# Patient Record
Sex: MALE | Race: WHITE | NOT HISPANIC OR LATINO | Employment: UNEMPLOYED | URBAN - METROPOLITAN AREA
[De-identification: names, ages, dates, MRNs, and addresses within clinical notes are randomized per-mention and may not be internally consistent; named-entity substitution may affect disease eponyms.]

---

## 2017-01-15 ENCOUNTER — TRANSFERRED RECORDS (OUTPATIENT)
Dept: HEALTH INFORMATION MANAGEMENT | Facility: CLINIC | Age: 37
End: 2017-01-15

## 2017-01-20 ENCOUNTER — RADIANT APPOINTMENT (OUTPATIENT)
Dept: GENERAL RADIOLOGY | Facility: CLINIC | Age: 37
End: 2017-01-20
Attending: FAMILY MEDICINE
Payer: COMMERCIAL

## 2017-01-20 ENCOUNTER — OFFICE VISIT (OUTPATIENT)
Dept: FAMILY MEDICINE | Facility: CLINIC | Age: 37
End: 2017-01-20
Payer: COMMERCIAL

## 2017-01-20 VITALS
DIASTOLIC BLOOD PRESSURE: 87 MMHG | HEART RATE: 59 BPM | SYSTOLIC BLOOD PRESSURE: 128 MMHG | TEMPERATURE: 97.1 F | HEIGHT: 69 IN | BODY MASS INDEX: 27.11 KG/M2 | WEIGHT: 183 LBS | OXYGEN SATURATION: 98 %

## 2017-01-20 DIAGNOSIS — S69.91XD RIGHT WRIST INJURY, SUBSEQUENT ENCOUNTER: Primary | ICD-10-CM

## 2017-01-20 DIAGNOSIS — S69.91XD RIGHT WRIST INJURY, SUBSEQUENT ENCOUNTER: ICD-10-CM

## 2017-01-20 DIAGNOSIS — S63.501D WRIST SPRAIN, RIGHT, SUBSEQUENT ENCOUNTER: ICD-10-CM

## 2017-01-20 PROCEDURE — 73110 X-RAY EXAM OF WRIST: CPT | Mod: RT

## 2017-01-20 PROCEDURE — 73130 X-RAY EXAM OF HAND: CPT | Mod: RT

## 2017-01-20 PROCEDURE — 99214 OFFICE O/P EST MOD 30 MIN: CPT | Performed by: FAMILY MEDICINE

## 2017-01-20 NOTE — NURSING NOTE
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    Weight as of this encounter: 183 lb (83.008 kg).  BP completed using cuff size: regular       Health Maintenance due pending provider review:  NONE    n/a      Oziel Boo CMA  "

## 2017-01-20 NOTE — PROGRESS NOTES
SUBJECTIVE:                                                    Dalton Tillman is a 36 year old male who presents to clinic today for the following health issues: right wrist pain.     On 1/13/17 patient slipped on ice and injured his right wrist. On 1/14/17 his wrist became swollen. On 1/15/17 he went to urgent care due to the pain and swelling in his wrist. His X-ray at urgent care showed no fracture. He was given a splint and told to follow up in 5 days.   Today he has decreased swelling and bruising. Still has some pain on the lateral dorsum of right hand. Has decreased ROM of right wrist.  Does not have numbness. He has been out of work since injuring his hand as he needs to lift heavy objects at work. Today he says he is barely able to lift a gallon of milk.       ED/UC Followup:    Facility:  [facility]  Date of visit: 1/15/2017  Reason for visit: right wrist injury,  Fell on ice 1/13/2017  Current Status: fine, needs FMLA papers filled out for work             Problem list and histories reviewed & adjusted, as indicated.  Additional history: as documented    Patient Active Problem List   Diagnosis     CARDIOVASCULAR SCREENING; LDL GOAL LESS THAN 160     No past surgical history on file.    Social History   Substance Use Topics     Smoking status: Former Smoker     Quit date: 10/15/2016     Smokeless tobacco: Never Used     Alcohol Use: 0.0 oz/week     0 Standard drinks or equivalent per week      Comment: Socailly     Family History   Problem Relation Age of Onset     Prostate Cancer Maternal Grandfather      MIKEY. Father      MI, 50         Current Outpatient Prescriptions   Medication Sig Dispense Refill     NO ACTIVE MEDICATIONS        imiquimod (ALDARA) 5 % cream Apply a small sized amount to warts or molluscum three times weekly at bedtime.   Wash off after 8 hours.   May use for up to 16 weeks. 12 packet 3     No Known Allergies  Labs reviewed in EPIC      ROS:  No fevers or chills. No other joint or muscle  "pain besides right wrist and hand pain.     OBJECTIVE:                                                    /87 mmHg  Pulse 59  Temp(Src) 97.1  F (36.2  C) (Oral)  Ht 5' 9\" (1.753 m)  Wt 183 lb (83.008 kg)  BMI 27.01 kg/m2  SpO2 98%  Body mass index is 27.01 kg/(m^2).  GENERAL: healthy, alert and no distress  RESP: lungs clear to auscultation - no rales, rhonchi or wheezes  CV: regular rate and rhythm, normal S1 S2, no S3 or S4, no murmur, click or rub, no peripheral edema and peripheral pulses strong  ABDOMEN: soft, nontender, no hepatosplenomegaly, no masses and bowel sounds normal  MS: right wrist- no edema and full ROM, no bruising , there is some pain in the ulnar part of the hand and also with movement in the wrist , no erythema . Decreased wrist ROM on right compared to left. Right  strength 4/5, left  strength 5/5. Hand is warm and well profused.     Diagnostic Test Results:  Xray - on the right wrist , no fractures      ASSESSMENT/PLAN:                                                    (S69.91XD) Right wrist injury, subsequent encounter  (primary encounter diagnosis)    Plan: XR Wrist Right G/E 3 Views, XR Hand Right G/E 3        Views  Ibuprofen for pain, ice a few x a day for 15 min each time, rest , avoid lifting and carrying heavy stuff and i have filled out his FMLA forms until Feb 7th , when he will come for a f/u OV. Pt is aware  and comfortable with the current plan.          - wear splint on wrist only a couple hours a day.        - can return to work 2/7/17  The medical student Ludwin  has acted as my scribe.  I have completed all components of the history, physical exam and assessment and plan and agree with the note as documented.    RTC if no improving or worsening.  Pt is aware  and comfortable with the current plan.      Kendy Gonzalez MD  [facility]    "

## 2017-01-20 NOTE — Clinical Note
[facility]  [address] [place]  [facility] [address] [phone]      January 20, 2017      Dalton Tillman  [address]  [facility] [address]        To Whom It May Concern :  This is a letter , indicating that the above pt was seen today at our clinic and he cannot work until February 4th, 2017.  Let me know if you have any questions.          Sincerely,      Kendy Gonzalez M.D.

## 2017-01-20 NOTE — NURSING NOTE
FMLA  Form(s) have been faxed.  Faxed or mailed to: number listed on form.  Was a copy sent to scanning?   yes sent to [employer] scanning on [place]  Oziel Boo CMA

## 2017-01-24 ENCOUNTER — TELEPHONE (OUTPATIENT)
Dept: FAMILY MEDICINE | Facility: CLINIC | Age: 37
End: 2017-01-24

## 2017-01-24 NOTE — TELEPHONE ENCOUNTER
Reason for Call:  Other call back    Detailed comments: pt calling to check on forms    Phone Number Patient can be reached at: Home number on file [phone] (home)    Best Time: anytime    Can we leave a detailed message on this number? YES    Call taken on 1/24/2017 at 2:48 PM by Angelica Perez

## 2017-01-24 NOTE — TELEPHONE ENCOUNTER
Spoke with patient, some questions on form were missed  Put form  back on LS desk to fill out and re fax   Oziel Boo CMA

## 2017-01-24 NOTE — TELEPHONE ENCOUNTER
Received form(s) from [employer] for FMLA.  Placed form(s) in/on LS's box.  Forms need to be filled out and signed and faxed to number listed on form..    Call pt to verify form was sent: No  Copy needs to be sent for scanning after completion: Yes    Previous visit printed and needs to be sent with forms    Jena Avendaño CMA

## 2017-01-25 NOTE — TELEPHONE ENCOUNTER
Form(s) have been faxed.  Faxed or mailed to: number listed on form.  Was a copy sent to scanning?   yes sent to [employer] scanning on [place]    Oziel Boo CMA

## 2017-02-04 ENCOUNTER — HOSPITAL ENCOUNTER (EMERGENCY)
Facility: CLINIC | Age: 37
Discharge: HOME | End: 2017-02-04
Attending: EMERGENCY MEDICINE | Admitting: EMERGENCY MEDICINE
Payer: COMMERCIAL

## 2017-02-04 VITALS
OXYGEN SATURATION: 97 % | TEMPERATURE: 98.6 F | SYSTOLIC BLOOD PRESSURE: 144 MMHG | BODY MASS INDEX: 25.76 KG/M2 | DIASTOLIC BLOOD PRESSURE: 103 MMHG | RESPIRATION RATE: 18 BRPM | WEIGHT: 170 LBS | HEART RATE: 69 BPM | HEIGHT: 68 IN

## 2017-02-04 DIAGNOSIS — S52.502A CLOSED FRACTURE DISTAL RADIUS AND ULNA, LEFT, INITIAL ENCOUNTER: ICD-10-CM

## 2017-02-04 DIAGNOSIS — S52.602A CLOSED FRACTURE DISTAL RADIUS AND ULNA, LEFT, INITIAL ENCOUNTER: ICD-10-CM

## 2017-02-04 PROCEDURE — 99283 EMERGENCY DEPT VISIT LOW MDM: CPT

## 2017-02-04 PROCEDURE — 25000132 ZZH RX MED GY IP 250 OP 250 PS 637: Performed by: EMERGENCY MEDICINE

## 2017-02-04 RX ORDER — OXYCODONE AND ACETAMINOPHEN 5; 325 MG/1; MG/1
1-2 TABLET ORAL EVERY 4 HOURS PRN
Qty: 15 TABLET | Refills: 0 | Status: SHIPPED | OUTPATIENT
Start: 2017-02-04 | End: 2018-03-16

## 2017-02-04 RX ORDER — ASPIRIN 81 MG
100 TABLET, DELAYED RELEASE (ENTERIC COATED) ORAL DAILY
Qty: 15 TABLET | Refills: 1 | Status: SHIPPED | OUTPATIENT
Start: 2017-02-04 | End: 2017-02-07

## 2017-02-04 RX ORDER — OXYCODONE AND ACETAMINOPHEN 5; 325 MG/1; MG/1
2 TABLET ORAL ONCE
Status: COMPLETED | OUTPATIENT
Start: 2017-02-04 | End: 2017-02-04

## 2017-02-04 RX ADMIN — OXYCODONE HYDROCHLORIDE AND ACETAMINOPHEN 2 TABLET: 5; 325 TABLET ORAL at 20:25

## 2017-02-04 ASSESSMENT — ENCOUNTER SYMPTOMS: ARTHRALGIAS: 1

## 2017-02-04 NOTE — ED AVS SNAPSHOT
Emergency Department    [address] [place] [address]    Phone:  [phone]    Fax:  [phone]                                       Dalton Tillman   MRN: [id number]    Department:   Emergency Department   Date of Visit:  2/4/2017           After Visit Summary Signature Page     I have received my discharge instructions, and my questions have been answered. I have discussed any challenges I see with this plan with the nurse or doctor.    ..........................................................................................................................................  Patient/Patient Representative Signature      ..........................................................................................................................................  Patient Representative Print Name and Relationship to Patient    ..................................................               ................................................  Date                                            Time    ..........................................................................................................................................  Reviewed by Signature/Title    ...................................................              ..............................................  Date                                                            Time

## 2017-02-04 NOTE — ED AVS SNAPSHOT
Emergency Department    [address] [facility] [address]    Phone:  [phone]    Fax:  [phone]                                       Dalton Tillman   MRN: [id number]    Department:   Emergency Department   Date of Visit:  2/4/2017           Patient Information     Date Of Birth          1980        Your diagnoses for this visit were:     Closed fracture distal radius and ulna, left, initial encounter        You were seen by Simona Guerra MD.      Follow-up Information     Follow up with Fransisca Garcia MD.    Specialty:  Orthopedics    Why:  Monday    Contact information:    [facility]  [address] [address]  [phone]        Discharge References/Attachments     FRACTURE, WRIST, GENERAL (ENGLISH)      Future Appointments        Provider Department Dept Phone Center    2/7/2017 9:30 AM Kendy Gonzalez MD [facility] [phone] UP      24 Hour Appointment Hotline       To make an appointment at any [facility] call [phone] ([phone]). If you don't have a family doctor or clinic, we will help you find one. [facility] are conveniently located to serve the needs of you and your family.             Review of your medicines      START taking        Dose / Directions Last dose taken    docusate sodium 100 MG tablet   Commonly known as:  COLACE   Dose:  100 mg   Quantity:  15 tablet        Take 100 mg by mouth daily   Refills:  1        oxyCODONE-acetaminophen 5-325 MG per tablet   Commonly known as:  PERCOCET   Dose:  1-2 tablet   Quantity:  15 tablet        Take 1-2 tablets by mouth every 4 hours as needed for pain   Refills:  0          Our records show that you are taking the medicines listed below. If these are incorrect, please call your family doctor or clinic.        Dose / Directions Last dose taken    imiquimod 5 % cream   Commonly known as:  ALDARA   Quantity:  12 packet        Apply a small sized  amount to warts or molluscum three times weekly at bedtime.   Wash off after 8 hours.   May use for up to 16 weeks.   Refills:  3        NO ACTIVE MEDICATIONS        Refills:  0                Prescriptions were sent or printed at these locations (2 Prescriptions)                   Other Prescriptions                Printed at Department/Unit printer (2 of 2)         oxyCODONE-acetaminophen (PERCOCET) 5-325 MG per tablet               docusate sodium (COLACE) 100 MG tablet                Orders Needing Specimen Collection     None      Pending Results     No orders found from 2/3/2017 to 2/5/2017.            Pending Culture Results     No orders found from 2/3/2017 to 2/5/2017.             Test Results from your hospital stay            Clinical Quality Measure: Blood Pressure Screening     Your blood pressure was checked while you were in the emergency department today. The last reading we obtained was  BP: (!) 179/104 mmHg . Please read the guidelines below about what these numbers mean and what you should do about them.  If your systolic blood pressure (the top number) is less than 120 and your diastolic blood pressure (the bottom number) is less than 80, then your blood pressure is normal. There is nothing more that you need to do about it.  If your systolic blood pressure (the top number) is 120-139 or your diastolic blood pressure (the bottom number) is 80-89, your blood pressure may be higher than it should be. You should have your blood pressure rechecked within a year by a primary care provider.  If your systolic blood pressure (the top number) is 140 or greater or your diastolic blood pressure (the bottom number) is 90 or greater, you may have high blood pressure. High blood pressure is treatable, but if left untreated over time it can put you at risk for heart attack, stroke, or kidney failure. You should have your blood pressure rechecked by a primary care provider within the next 4 weeks.  If your  "provider in the emergency department today gave you specific instructions to follow-up with your doctor or provider even sooner than that, you should follow that instruction and not wait for up to 4 weeks for your follow-up visit.        Thank you for choosing [facility]       Thank you for choosing [facility] for your care. Our goal is always to provide you with excellent care. Hearing back from our patients is one way we can continue to improve our services. Please take a few minutes to complete the written survey that you may receive in the mail after you visit with us. Thank you!        Nearpod Information     Nearpod lets you send messages to your doctor, view your test results, renew your prescriptions, schedule appointments and more. To sign up, go to [email or web address] . Click on \"Log in\" on the left side of the screen, which will take you to the Welcome page. Then click on \"Sign up Now\" on the right side of the page.     You will be asked to enter the access code listed below, as well as some personal information. Please follow the directions to create your username and password.     Your access code is: [email or web address]  Expires: 2017  9:03 PM     Your access code will  in 90 days. If you need help or a new code, please call your [facility] clinic or [phone].        Care EveryWhere ID     This is your Care EveryWhere ID. This could be used by other organizations to access your [facility] medical records  [id number]        After Visit Summary       This is your record. Keep this with you and show to your community pharmacist(s) and doctor(s) at your next visit.                  "

## 2017-02-05 NOTE — ED PROVIDER NOTES
"  History     Chief Complaint:  Wrist Pain      HPI   Dalton Tillman is a right hand dominant 36 year old male who presents for evaluation of wrist pain. Yesterday, the patient was snowboarding in [facility] when he fell on his outstretched left wrist sustaining an injury. He did not strike his head or sustain any other injury. He was seen in an emergency room there where he had X-rays indicating closed distal radius and ulna fractures that were reduced there and a plaster cast was applied with cuts at 180 degrees. He was provided a short course Percocet and Ibuprofen and discharged with a splint with instructions to follow up in the ED in the [facility] for potential surgery. He got home this evening so presents to the ED due to these instructions. Currently in the ED, the patient rates his pain at a severity of 7/10. He last took Percocet at 1400 today and last ate at 1430. He denies any numbness of the fingers.    Allergies:  NKDA     Medications:    Aldara cream  Ibuprofen  Percocet     Past Medical History:    History reviewed. No pertinent past medical history.     Past Surgical History:    History reviewed. No pertinent past surgical history.     Family History:    CAD - Father     Social History:  Tobacco use:    Former smoker, quit 10/15/2016  Alcohol use:    Positive, socially  Marital status:    Single   Accompanied to ED by:  Female       Review of Systems   Musculoskeletal: Positive for arthralgias (left wrist).   All other systems reviewed and are negative.    Physical Exam   First Vitals:  BP: (!) 179/104 mmHg  Heart Rate: 88  Temp: 98.6  F (37  C)  Resp: 18  Height: 172.7 cm (5' 8\")  Weight: 77.111 kg (170 lb)  SpO2: 98 %      Physical Exam    Physical Exam   Constitutional:  Patient is oriented to person, place, and time. They appear well-developed and well-nourished. Mild distress secondary to wrist pain.    HENT:   Mouth/Throat:   Oropharynx is clear and moist.   Eyes:    Conjunctivae " normal and EOM are normal. Pupils are equal, round, and reactive to light.   Neck:    Normal range of motion.   Cardiovascular: Normal rate, regular rhythm and normal heart sounds.  Exam reveals no gallop and no friction rub.  No murmur heard.  Pulmonary/Chest:  Effort normal and breath sounds normal. Patient has no wheezes. Patient has no rales.   Abdominal:   Soft. Bowel sounds are normal. Patient exhibits no mass. There is no tenderness. There is no rebound and no guarding.   Musculoskeletal:  The left wrist has a plaster cast on which does have slits on it at 180 degrees. The fingers are mildly edematous. He has normal sensation all fingers. He has pain radiating to the wrist with movement of the fingers.   Neurological:   Patient is alert and oriented to person, place, and time. Patient has normal strength. No cranial nerve deficit or sensory deficit. GCS 15  Skin:   Skin is warm and dry. No rash noted. No erythema.   Psychiatric:   Patient has a normal mood and affect. Patient's behavior is normal. Judgment and thought content normal.     Emergency Department Course     Interventions:  2025 Percocet 2 tablets PO    Emergency Department Course:  Nursing notes and vitals reviewed.  2005: I performed an exam of the patient as documented above.     X-ray tech loaded his X-rays onto the PACS system from the DVD he provided.     2038: I updated and reassessed the patient.     Findings and plan explained to the Patient. Patient discharged home with instructions regarding supportive care, medications, and reasons to return. The importance of close follow-up was reviewed. The patient was prescribed Percocet and Colace.      Impression & Plan      Medical Decision Making:  Dalton Tillman is a 36 year old male presenting to the emergency room at the recommendation of the emergency room in [place] where he was seen for a closed distal radius and ulna fracture. He understood that he was supposed to come to the  emergency room for surgery, however this is a closed fracture. He appears to be neurologically intact, although range of motion is unable to be assessed because he is in a cast. I was able to view his pre and post reduction X-rays and there appears to be fairly good anatomical alignment however this is intraarticular so will require surgery for stabilization and healing. At this point, I did explain to him that I will refer him to hand surgery, no emergent need for ortho consult. I will give him pain medication as they did not prescribe this and he will remain in his cast as this is sufficiently stabilizing his injury. At this point, there is no sign of compartment syndrome that I can see and he had good range of motion of his fingers. The patient feels comfortable with this plan. He was rewrapped with an ace wrap and his sling was reapplied.     Diagnosis:    ICD-10-CM   1. Closed fracture distal radius and ulna, left, initial encounter S52.502A    S52.602A       Disposition:  Discharged to home with Colace and Percocet.     Discharge Medications:  New Prescriptions    DOCUSATE SODIUM (COLACE) 100 MG TABLET    Take 100 mg by mouth daily    OXYCODONE-ACETAMINOPHEN (PERCOCET) 5-325 MG PER TABLET    Take 1-2 tablets by mouth every 4 hours as needed for pain       I, Lambert Cat, am serving as a scribe at 8:05 PM on 2/4/2017 to document services personally performed by Dr. Guerra, based on my observations and the provider's statements to me.      EMERGENCY DEPARTMENT        Simona Guerra MD  02/04/17 [id number]

## 2017-02-07 ENCOUNTER — OFFICE VISIT (OUTPATIENT)
Dept: FAMILY MEDICINE | Facility: CLINIC | Age: 37
End: 2017-02-07
Payer: COMMERCIAL

## 2017-02-07 VITALS
BODY MASS INDEX: 26.83 KG/M2 | OXYGEN SATURATION: 99 % | WEIGHT: 177 LBS | HEART RATE: 92 BPM | SYSTOLIC BLOOD PRESSURE: 140 MMHG | RESPIRATION RATE: 18 BRPM | TEMPERATURE: 99.5 F | DIASTOLIC BLOOD PRESSURE: 90 MMHG | HEIGHT: 68 IN

## 2017-02-07 DIAGNOSIS — S69.91XD RIGHT WRIST INJURY, SUBSEQUENT ENCOUNTER: Primary | ICD-10-CM

## 2017-02-07 PROCEDURE — 99213 OFFICE O/P EST LOW 20 MIN: CPT | Performed by: FAMILY MEDICINE

## 2017-02-07 NOTE — Clinical Note
[facility]  [address] [place]  [facility] [address] [phone]      February 7, 2017      Dalton Tillman  [address]  [facility] [address]        To Whom It May Concern     This is a letter indicating that the above pt is under my professional care and right wrist is no longer preventing him from returning to work . He can use his right wrist without restrictions.          Sincerely,      Kendy Gonzalez M.D.    [facility]

## 2017-02-07 NOTE — PROGRESS NOTES
SUBJECTIVE:                                                    Dalton Tillman is a 36 year old male who presents to clinic today for the following health issues:      Patient states that he needs clearance letter from provider to be able to return to work for right wrist. 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  Meanwhile he was snowboarding in [place] last week and broke his left arm several spots , seen at [facility] for that         Problem list and histories reviewed & adjusted, as indicated.  Additional history: as documented    Patient Active Problem List   Diagnosis     CARDIOVASCULAR SCREENING; LDL GOAL LESS THAN 160     History reviewed. No pertinent past surgical history.    Social History   Substance Use Topics     Smoking status: Former Smoker     Quit date: 10/15/2016     Smokeless tobacco: Never Used     Alcohol Use: 0.0 oz/week     0 Standard drinks or equivalent per week      Comment: Socailly     Family History   Problem Relation Age of Onset     Prostate Cancer Maternal Grandfather      NAOMIA.D. Father      MI, 50         Current Outpatient Prescriptions   Medication Sig Dispense Refill     HydrOXYzine Pamoate (VISTARIL PO)        oxyCODONE-acetaminophen (PERCOCET) 5-325 MG per tablet Take 1-2 tablets by mouth every 4 hours as needed for pain 15 tablet 0     imiquimod (ALDARA) 5 % cream Apply a small sized amount to warts or molluscum three times weekly at bedtime.   Wash off after 8 hours.   May use for up to 16 weeks. 12 packet 3     No Known Allergies  Recent Labs   Lab Test  09/13/12   0750   LDL  81   HDL  61   TRIG  66      BP Readings from Last 3 Encounters:   02/07/17 140/90   02/04/17 144/103   01/20/17 128/87    Wt Readings from Last 3 Encounters:   02/07/17 177 lb (80.287 kg)   02/04/17 170 lb (77.111 kg)   01/20/17 183 lb (83.008 kg)                  Labs reviewed in EPIC  Problem list,  "Medication list, Allergies, and Medical/Social/Surgical histories reviewed in EPIC and updated as appropriate.    ROS:  Constitutional, HEENT, cardiovascular, pulmonary, GI, , musculoskeletal, neuro, skin, endocrine and psych systems are negative, except as otherwise noted.    OBJECTIVE:                                                    /90 mmHg  Pulse 92  Temp(Src) 99.5  F (37.5  C) (Tympanic)  Resp 18  Ht 5' 8\" (1.727 m)  Wt 177 lb (80.287 kg)  BMI 26.92 kg/m2  SpO2 99%  Body mass index is 26.92 kg/(m^2).  GENERAL: healthy, alert and no distress  MS: no gross musculoskeletal defects noted, no edema    Diagnostic Test Results:  none      ASSESSMENT/PLAN:                                                        (S69.91XD) Right wrist injury, subsequent encounter  (primary encounter diagnosis)  Comment: seems that his right wrist is fine and now he has a good ROM there, no pain , no edema no erythema, note written for work that he is Ok to return without restrictions as far as the rigth wrist is concerned   Plan: Pt is aware  and comfortable with the current plan.  RTC if no improving or worsening.    Kendy Gonzalez MD  [facility]      HPI      ROS      Physical Exam      "

## 2017-02-07 NOTE — NURSING NOTE
"Chief Complaint   Patient presents with     Musculoskeletal Problem     Right wrist      /100 mmHg  Pulse 92  Temp(Src) 99.5  F (37.5  C) (Tympanic)  Resp 18  Ht 5' 8\" (1.727 m)  Wt 177 lb (80.287 kg)  BMI 26.92 kg/m2  SpO2 99% Estimated body mass index is 26.92 kg/(m^2) as calculated from the following:    Height as of this encounter: 5' 8\" (1.727 m).    Weight as of this encounter: 177 lb (80.287 kg).  bp completed using cuff size: regular       Health Maintenance addressed:  BP was high, used pink card, recheck manually    Possibly completing today per provider review.    Stacy Felix MA       "

## 2018-03-16 ENCOUNTER — NURSE TRIAGE (OUTPATIENT)
Dept: NURSING | Facility: CLINIC | Age: 38
End: 2018-03-16

## 2018-03-16 ENCOUNTER — RADIANT APPOINTMENT (OUTPATIENT)
Dept: GENERAL RADIOLOGY | Facility: CLINIC | Age: 38
End: 2018-03-16
Attending: FAMILY MEDICINE
Payer: COMMERCIAL

## 2018-03-16 ENCOUNTER — OFFICE VISIT (OUTPATIENT)
Dept: FAMILY MEDICINE | Facility: CLINIC | Age: 38
End: 2018-03-16
Payer: COMMERCIAL

## 2018-03-16 VITALS
HEART RATE: 85 BPM | WEIGHT: 175.7 LBS | SYSTOLIC BLOOD PRESSURE: 118 MMHG | TEMPERATURE: 98.1 F | BODY MASS INDEX: 26.63 KG/M2 | RESPIRATION RATE: 16 BRPM | OXYGEN SATURATION: 98 % | DIASTOLIC BLOOD PRESSURE: 76 MMHG | HEIGHT: 68 IN

## 2018-03-16 DIAGNOSIS — S00.03XA HEMATOMA OF SCALP, INITIAL ENCOUNTER: Primary | ICD-10-CM

## 2018-03-16 DIAGNOSIS — R55 VASOVAGAL SYNCOPE: ICD-10-CM

## 2018-03-16 DIAGNOSIS — S00.03XA HEMATOMA OF SCALP, INITIAL ENCOUNTER: ICD-10-CM

## 2018-03-16 PROCEDURE — 70260 X-RAY EXAM OF SKULL: CPT | Mod: FY

## 2018-03-16 PROCEDURE — 99213 OFFICE O/P EST LOW 20 MIN: CPT | Performed by: FAMILY MEDICINE

## 2018-03-16 NOTE — MR AVS SNAPSHOT
After Visit Summary   3/16/2018    Dalton Tillman    MRN: [id number]           Patient Information     Date Of Birth          1980        Visit Information        Provider Department      3/16/2018 1:00 PM Corinna Knapp MD [facility]        Care Instructions      Hematoma  A hematoma is a collection of blood trapped outside of a blood vessel. It is what we think of as a bruise or a contusion. It is usually seen under the skin as a black and blue spot on your arm or leg, or a bump on your head after an injury. It can be almost anywhere on or in your body. It can also occur in an internal organ where it can be more serious.  A hematoma is caused by an injury with damage to small blood vessels. This causes blood to leak into the tissues. Blood forms a pocket under the skin that swells and looks like a purplish patch.  Gradually the blood in the hematoma is absorbed back into the body. The swelling and pain of the hematoma will go away. This takes from 1 to 4 weeks, depending on the size of the hematoma. The skin over the hematoma may turn bluish then brown and yellow as the blood is dissolved and absorbed. Usually, this only takes a couple of weeks but can last months.  Home care    Limit motion of the joints near the hematoma. If the hematoma is large and painful, avoid sports and other vigorous physical activity until the swelling and pain goes away.    Apply an ice pack (ice cubes in a plastic bag, wrapped in a thin towel or a frozen bag of peas) over the injured area for 20 minutes every 1 to 2 hours the first day. Continue with ice packs 3 to 4 times a day for the next 2 days. Continue the use of ice packs for relief of pain and swelling as needed.    If you need anything for pain, you can take acetaminophen, unless you were given a different pain medicine to use. Talk with your doctor before using this medicine if you have chronic liver or kidney disease. Also talk with your  doctor if you have had a stomach ulcer or digestive tract bleeding, or are taking blood-thinner medicines.  Follow-up care  Follow up with your healthcare provider, or as advised. If X-rays or a CT scan were done, you will be notified if there is a change in the reading, especially if it affects treatment.  When to seek medical advice  Call your healthcare provider right away f any of the following occur:    Redness around the hematoma    Increase in pain or warmth in the hematoma    Increase in size of the hematoma    Fever of 100.4 F (38 C) or higher, or as directed by your healthcare provider    If the hematoma is on the arm or leg, watch for:    Increased swelling or pain in the extremity    Numbness or tingling or blue color of the hand or foot  Date Last Reviewed: 11/5/2015 2000-2017 [employer]. [address] [place] [address]. All rights reserved. This information is not intended as a substitute for professional medical care. Always follow your healthcare professional's instructions.        Uncertain Causes of Fall  You have had a fall today. But the cause of your fall is not certain. Falls can occur due to slipping, tripping or losing your balance. A fall can also occur from a fainting spell or seizure.  While a fall can happen for a simple reason (tripping over something), falls in elderly people are often caused by a combination of things:    Age-related decline in function with worsening balance, stability, vision, and muscle strength    Chronic illness such as heart arrhythmias, heart valve disease, vascular disease, COPD, diabetes, strokes, arthritis    Effects or side effects of medicines    Dehydration.    Environmental hazards such as uneven or slippery ground, unfamiliar place, obstacles, uneven surfaces, or slippery ground    Situational factors (related to the activity being done, e.g., rushing to the bathroom)  Because the cause of your fall today is not certain, it is  possible that a fainting spell or seizure was the cause. This means that it could happen again, without warning. If you fall again, without a cause, then you should return to this facility promptly to have further tests. Otherwise, follow up with your doctor as explained below.  It is normal to feel sore and tight in your muscles and back the next day, and not just the muscles you initially injured. Remember, all the parts of your body are connected, so while initially one area hurts, the next day another may hurt. Also, when you injure yourself, it causes inflammation, which then causes the muscles to tighten up and hurt more. After the initial worsening, it should gradually improve over the next few days. However, more severe pain should be reported.  Even without a definite head injury, you can still get a concussion. Concussions and even bleeding can still occur, especially if you have had a recent injury or take blood thinner medicine. It is not unusual to have a mild headache and feel tired and even nauseous or dizzy.  Home care    Rest today and resume your normal activities as soon as you are feeling back to normal. It is best to remain with someone who can check on you for the next 24 hours to watch for another episode of falling.    If you were injured during the fall, follow the advice from your doctor regarding care of your injury.     If you become light-headed or dizzy, lie down immediately or sit and lean forward with your head down.    As a precaution, do not drive a car or operate dangerous equipment, do not take a bath alone (use a shower instead) and do not swim alone until you see your doctor. A condition causing fainting or seizures must be ruled out before resuming these activities.    You may use acetaminophen or ibuprofen to control pain, unless another pain medicine was prescribed. If you have chronic liver or kidney disease or ever had a stomach ulcer or gastrointestinal bleeding, talk with  your doctor before using these medicines.    Keep your appointments for any further testing that may have been scheduled for you.  Follow-up care  Follow up with your healthcare provider, or as advised.  If X-rays or CT scan were done, you will be notified if there is a change in the reading, especially if it affects treatment.  Call 911  Call 911 if any of these occur:    Trouble breathing    Confused or difficulty arousing    Fainting or loss of consciousness    Rapid or very slow heart rate    Seizure    Difficulty with speech or vision, weakness of an arm or leg    Difficulty walking or talking, loss of balance, numbness or weakness in one side of your body, facial droop  When to seek medical advice  Call your healthcare provider right away if any of these occur:    Another unexplained fall    Dizziness    Severe headache    Nausea and vomiting    Blood in vomit, stools (black or red color)  Date Last Reviewed: 11/5/2015 2000-2017 [employer]. [address] [place] [address]. All rights reserved. This information is not intended as a substitute for professional medical care. Always follow your healthcare professional's instructions.          Follow-ups after your visit        Follow-up notes from your care team     Return if symptoms worsen or fail to improve.      Who to contact     If you have questions or need follow up information about today's clinic visit or your schedule please contact [facility] directly at [phone].  Normal or non-critical lab and imaging results will be communicated to you by MyChart, letter or phone within 4 business days after the clinic has received the results. If you do not hear from us within 7 days, please contact the clinic through MyChart or phone. If you have a critical or abnormal lab result, we will notify you by phone as soon as possible.  Submit refill requests through [employer] or call your pharmacy and they will forward the  "refill request to us. Please allow 3 business days for your refill to be completed.          Additional Information About Your Visit        MyChart Information     [employer] lets you send messages to your doctor, view your test results, renew your prescriptions, schedule appointments and more. To sign up, go to [email or web address] . Click on \"Log in\" on the left side of the screen, which will take you to the Welcome page. Then click on \"Sign up Now\" on the right side of the page.     You will be asked to enter the access code listed below, as well as some personal information. Please follow the directions to create your username and password.     Your access code is: [email or web address]  Expires: 2018  1:31 PM     Your access code will  in 90 days. If you need help or a new code, please call your [facility] clinic or [phone].        Care EveryWhere ID     This is your Care EveryWhere ID. This could be used by other organizations to access your [facility] medical records  [id number]        Your Vitals Were     Pulse Temperature Respirations Height Pulse Oximetry BMI (Body Mass Index)    85 98.1  F (36.7  C) (Oral) 16 5' 8\" (1.727 m) 98% 26.72 kg/m2       Blood Pressure from Last 3 Encounters:   18 118/76   17 140/90   17 (!) 144/103    Weight from Last 3 Encounters:   18 175 lb 11.2 oz (79.7 kg)   17 177 lb (80.3 kg)   17 170 lb (77.1 kg)              Today, you had the following     No orders found for display       Primary Care Provider Office Phone # Fax #    Manjinder Filipe Rodriguez [phone] [address] [facility] [address]        Equal Access to Services     ILIANA PATEL : Charles Gates, federico sanders, jael mcneil. [facility] [phone].    ATENCIÓN: Si habla español, tiene a juarez disposición servicios gratuitos de asistencia lingüística. Llame al " [phone].    We comply with applicable federal civil rights laws and [place] laws. We do not discriminate on the basis of race, color, national origin, age, disability, sex, sexual orientation, or gender identity.            Thank you!     Thank you for choosing [facility]  for your care. Our goal is always to provide you with excellent care. Hearing back from our patients is one way we can continue to improve our services. Please take a few minutes to complete the written survey that you may receive in the mail after your visit with us. Thank you!             Your Updated Medication List - Protect others around you: Learn how to safely use, store and throw away your medicines at www.disposemymeds.org.          This list is accurate as of 3/16/18  1:31 PM.  Always use your most recent med list.                   Brand Name Dispense Instructions for use Diagnosis    imiquimod 5 % cream    ALDARA    12 packet    Apply a small sized amount to warts or molluscum three times weekly at bedtime.   Wash off after 8 hours.   May use for up to 16 weeks.    Genital warts       VISTARIL PO

## 2018-03-16 NOTE — PROGRESS NOTES
SUBJECTIVE:   Dalton Tillman is a 37 year old male who presents to clinic today for the following health issues:      Chief Complaint   Patient presents with     Syncope     Please see TE about recent episode     Head Injury     Patient injured the back of his head when he fainted last night.     Last night the patient was sitting on the sofa for a few hours working.  Around 7 PM, he decided to go to the restroom and was planning to go out afterward for a class.  He was standing on on the toilet voiding and surrounding felt dizzy and fell unconscious.  He woke up looking at the bathroom ceiling.  Later he noticed pain in the posterior scalp.  Denies noticing any bleeding.  Decided to stay home last night.  He denies any numbness tingling changes in his vision or his hearing weakness.  Felt throbbing sensation today in his head today but denies noticing any headaches.  Denies feeling confused or disoriented except for one episode when he was taking the elevator to the clinic he stepped out of the elevator on the wrong floor. Pain at the site of impact. 2-3 out of ten. No severe enough to take pain medication for.   Denies having any similar symptoms in the past.  Denies smoking alcohol or drug use within the last few days.  He denies doing anything unusual.  Patient does not have any chronic medical conditions.  And denies feeling chest pain or palpitations.    Problem list and histories reviewed & adjusted, as indicated.  Additional history: as documented    Patient Active Problem List   Diagnosis     CARDIOVASCULAR SCREENING; LDL GOAL LESS THAN 160     History reviewed. No pertinent surgical history.    Social History   Substance Use Topics     Smoking status: Former Smoker     Quit date: 10/15/2016     Smokeless tobacco: Never Used     Alcohol use 0.0 oz/week     0 Standard drinks or equivalent per week      Comment: Socailly     Family History   Problem Relation Age of Onset     Prostate Cancer Maternal  "Grandfather      MIKEY. Father      MI, 50           Reviewed and updated as needed this visit by clinical staff  Tobacco  Allergies  Meds  Problems  Med Hx  Surg Hx  Fam Hx  Soc Hx        Reviewed and updated as needed this visit by Provider         ROS:  Constitutional, HEENT, cardiovascular, pulmonary, gi and gu systems are negative, except as otherwise noted.    OBJECTIVE:     /76  Pulse 85  Temp 98.1  F (36.7  C) (Oral)  Resp 16  Ht 5' 8\" (1.727 m)  Wt 175 lb 11.2 oz (79.7 kg)  SpO2 98%  BMI 26.72 kg/m2  Body mass index is 26.72 kg/(m^2).  GENERAL: healthy, alert and no distress  RESP: lungs clear to auscultation - no rales, rhonchi or wheezes  CV: regular rate and rhythm, normal S1 S2, no S3 or S4, no murmur, click or rub, no peripheral edema and peripheral pulses strong  ABDOMEN: soft, nontender, no hepatosplenomegaly, no masses and bowel sounds normal  SKIN: Area of erythema and tenderness to palpation over the sagittal suture. In the middle of parietal bone.  2.5 cm x 2.5 cm.   NEURO: Normal strength and tone, mentation intact and speech normal    Diagnostic Test Results:  none     ASSESSMENT/PLAN:   1. Vasovagal syncope  Assessment: We discussed all the possible differential diagnosis for syncopal episode which includes cardiac abnormality, hypotension, intracranial abnormality.  During today's exam, the patient denies any neurological symptoms or cardiac symptoms.  Exam was unremarkable.  He was advised to increase his oral hydration over the next few days.  Monitor symptoms if he has nausea vomiting headache or any neurological symptoms or recurrence of the syncopal episodes, he should present to the emergency room on an urgent care for immediate evaluation.  He was also informed that if symptoms developed, he should get a CT scan we ordered for him. The patient denies any concerns at this time. Reports feeling well without confusion or disorientation.   - CT Head w/o Contrast; " Future  - XR Skull G/E 4 Views; Future    2. Hematoma of scalp, initial encounter  Localized superficial hematoma. Patient was advised to do cold compress to alleviate the swelling.   Plan:  - XR Skull G/E 4 Views; Future    Corinna Knapp MD  [facility]  PAGER: [phone] or (W): [phone]

## 2018-03-16 NOTE — TELEPHONE ENCOUNTER
Dalton fainted last night while urinating and felt dizzy and woke up on the floor.  Dalton did hit his head but denies vomiting, double vision and loss of coordination.  Dalton feels fine currently.

## 2018-03-16 NOTE — PATIENT INSTRUCTIONS
Hematoma  A hematoma is a collection of blood trapped outside of a blood vessel. It is what we think of as a bruise or a contusion. It is usually seen under the skin as a black and blue spot on your arm or leg, or a bump on your head after an injury. It can be almost anywhere on or in your body. It can also occur in an internal organ where it can be more serious.  A hematoma is caused by an injury with damage to small blood vessels. This causes blood to leak into the tissues. Blood forms a pocket under the skin that swells and looks like a purplish patch.  Gradually the blood in the hematoma is absorbed back into the body. The swelling and pain of the hematoma will go away. This takes from 1 to 4 weeks, depending on the size of the hematoma. The skin over the hematoma may turn bluish then brown and yellow as the blood is dissolved and absorbed. Usually, this only takes a couple of weeks but can last months.  Home care    Limit motion of the joints near the hematoma. If the hematoma is large and painful, avoid sports and other vigorous physical activity until the swelling and pain goes away.    Apply an ice pack (ice cubes in a plastic bag, wrapped in a thin towel or a frozen bag of peas) over the injured area for 20 minutes every 1 to 2 hours the first day. Continue with ice packs 3 to 4 times a day for the next 2 days. Continue the use of ice packs for relief of pain and swelling as needed.    If you need anything for pain, you can take acetaminophen, unless you were given a different pain medicine to use. Talk with your doctor before using this medicine if you have chronic liver or kidney disease. Also talk with your doctor if you have had a stomach ulcer or digestive tract bleeding, or are taking blood-thinner medicines.  Follow-up care  Follow up with your healthcare provider, or as advised. If X-rays or a CT scan were done, you will be notified if there is a change in the reading, especially if it affects  treatment.  When to seek medical advice  Call your healthcare provider right away f any of the following occur:    Redness around the hematoma    Increase in pain or warmth in the hematoma    Increase in size of the hematoma    Fever of 100.4 F (38 C) or higher, or as directed by your healthcare provider    If the hematoma is on the arm or leg, watch for:    Increased swelling or pain in the extremity    Numbness or tingling or blue color of the hand or foot  Date Last Reviewed: 11/5/2015 2000-2017 [employer]. [address] [place] [address]. All rights reserved. This information is not intended as a substitute for professional medical care. Always follow your healthcare professional's instructions.        Uncertain Causes of Fall  You have had a fall today. But the cause of your fall is not certain. Falls can occur due to slipping, tripping or losing your balance. A fall can also occur from a fainting spell or seizure.  While a fall can happen for a simple reason (tripping over something), falls in elderly people are often caused by a combination of things:    Age-related decline in function with worsening balance, stability, vision, and muscle strength    Chronic illness such as heart arrhythmias, heart valve disease, vascular disease, COPD, diabetes, strokes, arthritis    Effects or side effects of medicines    Dehydration.    Environmental hazards such as uneven or slippery ground, unfamiliar place, obstacles, uneven surfaces, or slippery ground    Situational factors (related to the activity being done, e.g., rushing to the bathroom)  Because the cause of your fall today is not certain, it is possible that a fainting spell or seizure was the cause. This means that it could happen again, without warning. If you fall again, without a cause, then you should return to this facility promptly to have further tests. Otherwise, follow up with your doctor as explained below.  It is normal to  feel sore and tight in your muscles and back the next day, and not just the muscles you initially injured. Remember, all the parts of your body are connected, so while initially one area hurts, the next day another may hurt. Also, when you injure yourself, it causes inflammation, which then causes the muscles to tighten up and hurt more. After the initial worsening, it should gradually improve over the next few days. However, more severe pain should be reported.  Even without a definite head injury, you can still get a concussion. Concussions and even bleeding can still occur, especially if you have had a recent injury or take blood thinner medicine. It is not unusual to have a mild headache and feel tired and even nauseous or dizzy.  Home care    Rest today and resume your normal activities as soon as you are feeling back to normal. It is best to remain with someone who can check on you for the next 24 hours to watch for another episode of falling.    If you were injured during the fall, follow the advice from your doctor regarding care of your injury.     If you become light-headed or dizzy, lie down immediately or sit and lean forward with your head down.    As a precaution, do not drive a car or operate dangerous equipment, do not take a bath alone (use a shower instead) and do not swim alone until you see your doctor. A condition causing fainting or seizures must be ruled out before resuming these activities.    You may use acetaminophen or ibuprofen to control pain, unless another pain medicine was prescribed. If you have chronic liver or kidney disease or ever had a stomach ulcer or gastrointestinal bleeding, talk with your doctor before using these medicines.    Keep your appointments for any further testing that may have been scheduled for you.  Follow-up care  Follow up with your healthcare provider, or as advised.  If X-rays or CT scan were done, you will be notified if there is a change in the reading,  especially if it affects treatment.  Call 911  Call 911 if any of these occur:    Trouble breathing    Confused or difficulty arousing    Fainting or loss of consciousness    Rapid or very slow heart rate    Seizure    Difficulty with speech or vision, weakness of an arm or leg    Difficulty walking or talking, loss of balance, numbness or weakness in one side of your body, facial droop  When to seek medical advice  Call your healthcare provider right away if any of these occur:    Another unexplained fall    Dizziness    Severe headache    Nausea and vomiting    Blood in vomit, stools (black or red color)  Date Last Reviewed: 11/5/2015 2000-2017 [facility]. [address] [place] [address], [place] [address]. All rights reserved. This information is not intended as a substitute for professional medical care. Always follow your healthcare professional's instructions.

## 2018-03-16 NOTE — LETTER
March 16, 2018      Dalton Tillman  [address] [facility] [address]        To Whom It May Concern:    Dalton Tillman was seen in our clinic. He may return to work on (03/20/2018) without restrictions.           Sincerely,          Corinna Knapp MD

## 2018-09-10 ENCOUNTER — OFFICE VISIT (OUTPATIENT)
Dept: FAMILY MEDICINE | Facility: CLINIC | Age: 38
End: 2018-09-10
Payer: COMMERCIAL

## 2018-09-10 VITALS
WEIGHT: 179.5 LBS | HEIGHT: 68 IN | DIASTOLIC BLOOD PRESSURE: 76 MMHG | BODY MASS INDEX: 27.2 KG/M2 | TEMPERATURE: 98.1 F | OXYGEN SATURATION: 100 % | SYSTOLIC BLOOD PRESSURE: 130 MMHG | HEART RATE: 65 BPM

## 2018-09-10 DIAGNOSIS — J06.9 VIRAL UPPER RESPIRATORY TRACT INFECTION: Primary | ICD-10-CM

## 2018-09-10 LAB
DEPRECATED S PYO AG THROAT QL EIA: NORMAL
SPECIMEN SOURCE: NORMAL

## 2018-09-10 PROCEDURE — 87081 CULTURE SCREEN ONLY: CPT | Performed by: FAMILY MEDICINE

## 2018-09-10 PROCEDURE — 99213 OFFICE O/P EST LOW 20 MIN: CPT | Performed by: FAMILY MEDICINE

## 2018-09-10 PROCEDURE — 87880 STREP A ASSAY W/OPTIC: CPT | Performed by: FAMILY MEDICINE

## 2018-09-10 NOTE — PATIENT INSTRUCTIONS
Self-Care for Sore Throats    Sore throats happen for many reasons, such as colds, allergies, and infections caused by viruses or bacteria. In any case, your throat becomes red and sore. Your goal for self-care is to reduce your discomfort while giving your throat a chance to heal.  Moisten and soothe your throat  Tips include the following:    Try a sip of water first thing after waking up.    Keep your throat moist by drinking 6 or more glasses of clear liquids every day.    Run a cool-air humidifier in your room overnight.    Avoid cigarette smoke.     Suck on throat lozenges, cough drops, hard candy, ice chips, or frozen fruit-juice bars. Use the sugar-free versions if your diet or medical condition requires them.  Gargle to ease irritation  Gargling every hour or 2 can ease irritation. Try gargling with 1 of these solutions:    1/4 teaspoon of salt in 1/2 cup of warm water    An over-the-counter anesthetic gargle  Use medicine for more relief  Over-the-counter medicine can reduce sore throat symptoms. Ask your pharmacist if you have questions about which medicine to use:    Ease pain with anesthetic sprays. Aspirin or an aspirin substitute also helps. Remember, never give aspirin to anyone 18 or younger, or if you are already taking blood thinners.     For sore throats caused by allergies, try antihistamines to block the allergic reaction.    Remember: unless a sore throat is caused by a bacterial infection, antibiotics won t help you.  Prevent future sore throats  Prevention tips include the following:    Stop smoking or reduce contact with secondhand smoke. Smoke irritates the tender throat lining.    Limit contact with pets and with allergy-causing substances, such as pollen and mold.    When you re around someone with a sore throat or cold, wash your hands often to keep viruses or bacteria from spreading.    Don t strain your vocal cords.  Call your healthcare provider  Contact your healthcare provider if  you have:    A temperature over 101 F (38.3 C)    White spots on the throat    Great difficulty swallowing    Trouble breathing    A skin rash    Recent exposure to someone else with strep bacteria    Severe hoarseness and swollen glands in the neck or jaw   Date Last Reviewed: 8/1/2016 2000-2017 [employer]. [address] [place], [place] [address]. All rights reserved. This information is not intended as a substitute for professional medical care. Always follow your healthcare professional's instructions.

## 2018-09-10 NOTE — PROGRESS NOTES
SUBJECTIVE:   Dalton Tillman is a 37 year old male who presents to clinic today for the following health issues:    RESPIRATORY SYMPTOMS      Duration: Saturday night     Description  sore throat and cough    Severity: moderate    Accompanying signs and symptoms: stuffiness and lightheadedness     History (predisposing factors):  none    Precipitating or alleviating factors: None    Therapies tried and outcome:  None , coat tea,water with lemon    Patient reports a 2 day history of sore throat with associated non-productive cough, rhinorrhea, nasal congestion, post-nasal drip, and bilateral maxillary sinus pressure. The sore throat pain is exacerbated with swallowing and coughing. The patient did report one episode of emesis during the 1st day which he believes was secondary to a coughing fit. He denies any further episodes of emesis or nausea. He also denies fevers, chills, SOB, chest pain, abdominal pain, changes in bowel movements, and headaches. Of note, the patient is visiting a relative's  baby this weekend and is wondering whether he should be taking any specific medications or precautions prior to seeing the baby.    Problem list and histories reviewed & adjusted, as indicated.  Additional history: none    Patient Active Problem List   Diagnosis     CARDIOVASCULAR SCREENING; LDL GOAL LESS THAN 160     No past surgical history on file.    Social History   Substance Use Topics     Smoking status: Former Smoker     Quit date: 10/15/2016     Smokeless tobacco: Never Used     Alcohol use 0.0 oz/week     0 Standard drinks or equivalent per week      Comment: Socailly     Family History   Problem Relation Age of Onset     Prostate Cancer Maternal Grandfather      C.A.D. Father      MI, 50         Current Outpatient Prescriptions   Medication Sig Dispense Refill     HydrOXYzine Pamoate (VISTARIL PO)        imiquimod (ALDARA) 5 % cream Apply a small sized amount to warts or molluscum three times weekly at  "bedtime.   Wash off after 8 hours.   May use for up to 16 weeks. (Patient not taking: Reported on 3/16/2018) 12 packet 3     No Known Allergies  Labs reviewed in EPIC    Reviewed and updated as needed this visit by clinical staff  Allergies  Meds       Reviewed and updated as needed this visit by Provider         ROS:  Constitutional, HEENT, cardiovascular, pulmonary, gi and gu systems are negative, except as otherwise noted.    OBJECTIVE:     /76  Pulse 65  Temp 98.1  F (36.7  C) (Oral)  Ht 5' 8\" (1.727 m)  Wt 179 lb 8 oz (81.4 kg)  SpO2 100%  BMI 27.29 kg/m2  Body mass index is 27.29 kg/(m^2).  GENERAL: healthy, alert and no distress  HENT: ear canals and TM's normal. Posterior oropharynx shows erythema, but no edema or exudate. Nose and mouth without ulcers or lesions  NECK: no adenopathy, no asymmetry, masses, or scars and thyroid normal to palpation  RESP: lungs clear to auscultation - no rales, rhonchi or wheezes  CV: regular rate and rhythm, normal S1 S2, no S3 or S4, no murmur, click or rub  NEURO: A&Ox3, mentation intact and speech normal  PSYCH: mentation appears normal, affect normal/bright    Diagnostic Test Results:  Results for orders placed or performed in visit on 09/10/18 (from the past 24 hour(s))   Rapid strep screen   Result Value Ref Range    Specimen Description Throat     Rapid Strep A Screen       NEGATIVE: No Group A streptococcal antigen detected by immunoassay, await culture report.       ASSESSMENT/PLAN:   1. Viral upper respiratory tract infection   Assessment: We discussed the possible differentials for throat pain which includes strep throat, post-nasal drip, and viral URI. During the exam today the patient denies any purulent rhinorrhea, purulent cough, HA, and significant sinus pressure. Exam was significant for posterior oropharynx erythema which made the differential of a viral URI or irritation due to post-nasal drip more likely, especially with a negative rapid " strep. He was advised to try OTC medication for symptom relief and instructed to watch for signs of sinusitis which include worsening sinus pressure, HA, fever, or any other new symptoms. He was informed that if symptoms developed, he should call into the clinic or send a message over [employer] to receive a prescription for antibiotics. The patient denies any other concerns at this time.  - Rapid strep screen  - Beta strep group A culture    Gregorio Nolasco MS3    Attending Addendum- I obtained history, seen and evaluated the patient independently.   Results were discussed with the medical student directly. Agree with note above.     Corinna Knapp MD  [facility]

## 2018-09-10 NOTE — MR AVS SNAPSHOT
After Visit Summary   9/10/2018    Dalton Tillman    MRN: [id number]           Patient Information     Date Of Birth          1980        Visit Information        Provider Department      9/10/2018 8:00 AM Corinna Knapp MD [facility]        Today's Diagnoses     Throat pain    -  1      Care Instructions      Self-Care for Sore Throats    Sore throats happen for many reasons, such as colds, allergies, and infections caused by viruses or bacteria. In any case, your throat becomes red and sore. Your goal for self-care is to reduce your discomfort while giving your throat a chance to heal.  Moisten and soothe your throat  Tips include the following:    Try a sip of water first thing after waking up.    Keep your throat moist by drinking 6 or more glasses of clear liquids every day.    Run a cool-air humidifier in your room overnight.    Avoid cigarette smoke.     Suck on throat lozenges, cough drops, hard candy, ice chips, or frozen fruit-juice bars. Use the sugar-free versions if your diet or medical condition requires them.  Gargle to ease irritation  Gargling every hour or 2 can ease irritation. Try gargling with 1 of these solutions:    1/4 teaspoon of salt in 1/2 cup of warm water    An over-the-counter anesthetic gargle  Use medicine for more relief  Over-the-counter medicine can reduce sore throat symptoms. Ask your pharmacist if you have questions about which medicine to use:    Ease pain with anesthetic sprays. Aspirin or an aspirin substitute also helps. Remember, never give aspirin to anyone 18 or younger, or if you are already taking blood thinners.     For sore throats caused by allergies, try antihistamines to block the allergic reaction.    Remember: unless a sore throat is caused by a bacterial infection, antibiotics won t help you.  Prevent future sore throats  Prevention tips include the following:    Stop smoking or reduce contact with secondhand smoke. Smoke  "irritates the tender throat lining.    Limit contact with pets and with allergy-causing substances, such as pollen and mold.    When you re around someone with a sore throat or cold, wash your hands often to keep viruses or bacteria from spreading.    Don t strain your vocal cords.  Call your healthcare provider  Contact your healthcare provider if you have:    A temperature over 101 F (38.3 C)    White spots on the throat    Great difficulty swallowing    Trouble breathing    A skin rash    Recent exposure to someone else with strep bacteria    Severe hoarseness and swollen glands in the neck or jaw   Date Last Reviewed: 8/1/2016 2000-2017 [employer]. [address] [place], [place] [address]. All rights reserved. This information is not intended as a substitute for professional medical care. Always follow your healthcare professional's instructions.                Follow-ups after your visit        Who to contact     If you have questions or need follow up information about today's clinic visit or your schedule please contact [facility] directly at [phone].  Normal or non-critical lab and imaging results will be communicated to you by Rundownhart, letter or phone within 4 business days after the clinic has received the results. If you do not hear from us within 7 days, please contact the clinic through Rundownhart or phone. If you have a critical or abnormal lab result, we will notify you by phone as soon as possible.  Submit refill requests through GiveLoop or call your pharmacy and they will forward the refill request to us. Please allow 3 business days for your refill to be completed.          Additional Information About Your Visit        RundownharGame Blisters Information     GiveLoop lets you send messages to your doctor, view your test results, renew your prescriptions, schedule appointments and more. To sign up, go to [email or web address] . Click on \"Log in\" on the left side of the screen, " "which will take you to the Welcome page. Then click on \"Sign up Now\" on the right side of the page.     You will be asked to enter the access code listed below, as well as some personal information. Please follow the directions to create your username and password.     Your access code is: [email or web address]  Expires: 2018  8:45 AM     Your access code will  in 90 days. If you need help or a new code, please call your [facility] clinic or [phone].        Care EveryWhere ID     This is your Care EveryWhere ID. This could be used by other organizations to access your [facility] medical records  [id number]        Your Vitals Were     Pulse Temperature Height Pulse Oximetry BMI (Body Mass Index)       65 98.1  F (36.7  C) (Oral) 5' 8\" (1.727 m) 100% 27.29 kg/m2        Blood Pressure from Last 3 Encounters:   09/10/18 130/76   18 118/76   17 140/90    Weight from Last 3 Encounters:   09/10/18 179 lb 8 oz (81.4 kg)   18 175 lb 11.2 oz (79.7 kg)   17 177 lb (80.3 kg)              We Performed the Following     Beta strep group A culture     Rapid strep screen        Primary Care Provider Office Phone # Fax #    Manjinder Filipe Rodriguez [phone] [address] [facility] [address]        Equal Access to Services     [facility] Center[facility]ANK : Hadii aad ku hadasho Soomaali, waaxda luqadaha, qaybta kaalmada adeegyada, jael benson . So [facility] [phone].    ATENCIÓN: Si habla español, tiene a juarez disposición servicios gratuitos de asistencia lingüística. Llame al [phone].    We comply with applicable federal civil rights laws and [place] laws. We do not discriminate on the basis of race, color, national origin, age, disability, sex, sexual orientation, or gender identity.            Thank you!     Thank you for choosing [facility]  for your care. Our goal is always to provide you with excellent care. Hearing back from our patients " is one way we can continue to improve our services. Please take a few minutes to complete the written survey that you may receive in the mail after your visit with us. Thank you!             Your Updated Medication List - Protect others around you: Learn how to safely use, store and throw away your medicines at www.disposemymeds.org.          This list is accurate as of 9/10/18  8:45 AM.  Always use your most recent med list.                   Brand Name Dispense Instructions for use Diagnosis    imiquimod 5 % cream    ALDARA    12 packet    Apply a small sized amount to warts or molluscum three times weekly at bedtime.   Wash off after 8 hours.   May use for up to 16 weeks.    Genital warts       VISTARIL PO

## 2018-09-11 LAB
BACTERIA SPEC CULT: NORMAL
SPECIMEN SOURCE: NORMAL

## 2019-11-08 ENCOUNTER — OFFICE VISIT (OUTPATIENT)
Dept: FAMILY MEDICINE | Facility: CLINIC | Age: 39
End: 2019-11-08
Payer: COMMERCIAL

## 2019-11-08 VITALS
WEIGHT: 175.5 LBS | TEMPERATURE: 98.2 F | RESPIRATION RATE: 14 BRPM | DIASTOLIC BLOOD PRESSURE: 92 MMHG | BODY MASS INDEX: 26.6 KG/M2 | HEIGHT: 68 IN | SYSTOLIC BLOOD PRESSURE: 134 MMHG | HEART RATE: 63 BPM | OXYGEN SATURATION: 98 %

## 2019-11-08 DIAGNOSIS — A63.0 GENITAL WARTS: ICD-10-CM

## 2019-11-08 DIAGNOSIS — R03.0 PREHYPERTENSION: Primary | ICD-10-CM

## 2019-11-08 DIAGNOSIS — L98.9 SKIN LESION: ICD-10-CM

## 2019-11-08 PROCEDURE — 99214 OFFICE O/P EST MOD 30 MIN: CPT | Performed by: FAMILY MEDICINE

## 2019-11-08 RX ORDER — IMIQUIMOD 12.5 MG/.25G
CREAM TOPICAL
Qty: 12 PACKET | Refills: 3 | Status: SHIPPED | OUTPATIENT
Start: 2019-11-08 | End: 2021-01-27

## 2019-11-08 ASSESSMENT — MIFFLIN-ST. JEOR: SCORE: 1695.56

## 2019-11-08 ASSESSMENT — PAIN SCALES - GENERAL: PAINLEVEL: NO PAIN (0)

## 2019-11-08 NOTE — PROGRESS NOTES
Subjective     Dalton Tillman is a 38 year old male who presents to clinic today for the following health issues:    HPI   Hypertension Follow-up      Do you check your blood pressure regularly outside of the clinic? No     Are you following a low salt diet? No    Are your blood pressures ever more than 140 on the top number (systolic) OR more   than 90 on the bottom number (diastolic), for example 140/90? No      How many servings of fruits and vegetables do you eat daily?  2-3    On average, how many sweetened beverages do you drink each day (soda, juice, sweet tea, etc)?   2 cups of pop  How many days per week do you miss taking your medication?  Patient is not on bp medi's     What makes it hard for you to take your medications?  N/A    Also has a Hx of genital warts, we discussed aldara rx and efficacy    Also a skin lesion on nose,     ROS: As per HPI.    CV: no palpitations, no chest pain, no lower extremity swelling.  Resp: no shortness of breath, wheezing, or cough.    I have reviewed and updated the patient's past medical history in the EMR. Current problems are:    Patient Active Problem List   Diagnosis     CARDIOVASCULAR SCREENING; LDL GOAL LESS THAN 160     No past surgical history on file.    Social History     Tobacco Use     Smoking status: Former Smoker     Last attempt to quit: 10/15/2016     Years since quitting: 3.0     Smokeless tobacco: Never Used   Substance Use Topics     Alcohol use: Yes     Alcohol/week: 0.0 standard drinks     Comment: Socailly     Family History   Problem Relation Age of Onset     Prostate Cancer Maternal Grandfather      C.A.D. Father         MI, 50         Allergies, reviewed:   No Known Allergies    No current outpatient medications on file prior to visit.  No current facility-administered medications on file prior to visit.         Objective:  BP (!) 134/92 (BP Location: Right arm, Patient Position: Sitting, Cuff Size: Adult Regular)   Pulse 63   Temp 98.2  F (36.8  " C) (Oral)   Resp 14   Ht 1.727 m (5' 8\")   Wt 79.6 kg (175 lb 8 oz)   SpO2 98%   BMI 26.68 kg/m    General Appearance: Pleasant, alert, WN/WD in no acute respiratory distress.   Skin: nose lesion seb hyperplasia vs BCC, rec follow up with dermatology.  Neurologic Exam: Nonfocal, no tremor. Normal gait.  Psychiatric Exam: Alert - appropriate, normal affect      BP Readings from Last 3 Encounters:   11/08/19 (!) 134/92   09/10/18 130/76   03/16/18 118/76    Wt Readings from Last 3 Encounters:   11/08/19 79.6 kg (175 lb 8 oz)   09/10/18 81.4 kg (179 lb 8 oz)   03/16/18 79.7 kg (175 lb 11.2 oz)          Recent Labs   Lab Test 09/13/12  0750   LDL 81   HDL 61   TRIG 66        ASSESSMENT, PLAN:  1. Prehypertension   We discussed ways of treatment inc tobacco, exercise, diet and medications  BP Readings from Last 6 Encounters:   11/08/19 (!) 134/92   09/10/18 130/76   03/16/18 118/76   02/07/17 140/90   02/04/17 (!) 144/103   01/20/17 128/87       He would like to focus on lifestyle measures first  prob per exam: no signs of cardiovascular/renal causes of HTN such as Aortic Stenosis, abd bruit      2. Skin lesion  referral  - DERMATOLOGY REFERRAL    3. Genital warts  Medication.    - imiquimod (ALDARA) 5 % external cream; Apply a small sized amount to warts or molluscum three times weekly at bedtime.   Wash off after 8 hours.   May use for up to 16 weeks.  Dispense: 12 packet; Refill: 3            No follow-ups on file.    "

## 2019-12-12 ENCOUNTER — OFFICE VISIT (OUTPATIENT)
Dept: DERMATOLOGY | Facility: CLINIC | Age: 39
End: 2019-12-12
Attending: FAMILY MEDICINE
Payer: COMMERCIAL

## 2019-12-12 DIAGNOSIS — D22.9 MULTIPLE BENIGN NEVI: ICD-10-CM

## 2019-12-12 DIAGNOSIS — D48.5 NEOPLASM OF UNCERTAIN BEHAVIOR OF SKIN: Primary | ICD-10-CM

## 2019-12-12 PROCEDURE — 88305 TISSUE EXAM BY PATHOLOGIST: CPT | Mod: TC | Performed by: DERMATOLOGY

## 2019-12-12 ASSESSMENT — PAIN SCALES - GENERAL: PAINLEVEL: NO PAIN (0)

## 2019-12-12 NOTE — PATIENT INSTRUCTIONS

## 2019-12-12 NOTE — NURSING NOTE
Chief Complaint   Patient presents with     Derm Problem     Patient is here today for a lesion on left nasal side wall. No personal history of skin cancer.      Paulette Gao CMA

## 2019-12-12 NOTE — LETTER
12/12/2019       RE: Dalton Tillman  [address]  [facility] [address]     Dear Colleague,    Thank you for referring your patient, Dalton Tillman, to the [facility] DERMATOLOGY at [facility]. Please see a copy of my visit note below.    [facility] Dermatology Note      Dermatology Problem List:  1.NUB, L nose. Ddx dermal nevus, fibrous papule. S/p shave bx 12/12/19.     CC:   Chief Complaint   Patient presents with     Derm Problem     Patient is here today for a lesion on left nasal side wall. No personal history of skin cancer.        Encounter Date: Dec 12, 2019    History of Present Illness:  Mr. Dalton Tillman is a 38 year old male who presents in referral for a spot on his nose. He has noticed an enlarging, skin-colored bump on his left nose for about 3 years. Lesion has been mildly itchy and he is bothered by appearance. He has had a mole on his scalp removed by his PCP prior.    The patient otherwise denies any new or concerning lesions. No bleeding, painful, pruritic, or changing lesions. They report no personal history of skin cancer. There is no family history of skin cancer. No history of immunosuppression. No history of indoor tanning. They do use sunscreen and protective clothing when outdoors for sun protection. There is some occupational exposure to ultraviolet light or other forms of radiation. 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 Health otherwise stable. No other skin concerns.     Past Medical History:   Patient Active Problem List   Diagnosis     CARDIOVASCULAR SCREENING; LDL GOAL LESS THAN 160     No past medical history on file.  No past surgical history on file.    Social History:  Patient reports that he has been smoking. He has never used smokeless tobacco. He reports current alcohol use. He reports current drug use.    Family History:  Family History   Problem Relation Age of Onset     Prostate Cancer Maternal Grandfather       POLA.A.D. Father         MI, 50       Medications:  Current Outpatient Medications   Medication Sig Dispense Refill     imiquimod (ALDARA) 5 % external cream Apply a small sized amount to warts or molluscum three times weekly at bedtime.   Wash off after 8 hours.   May use for up to 16 weeks. 12 packet 3     No Known Allergies    Review of Systems:  -Skin Establ Pt: The patient denies any new rash, pruritus, or lesions that are symptomatic, changing or bleeding, except as per HPI.  -Constitutional: Otherwise feeling well today, in usual state of health.  -HEENT: Patient denies nonhealing oral sores.  -Skin: As above in HPI. No additional skin concerns.    Physical exam:  Vitals: There were no vitals taken for this visit.  GEN: This is a well developed, well-nourished male in no acute distress, in a pleasant mood.    SKIN: Focused examination of the face and neck was performed.  -Stephen skin type: II  - Brown macules and papules on scalp and face without concerning dermoscopy features.  - On the left nose, there is a ~6 mm skin-colored papule with dilated blood vessels, non-specific, under dermoscopy.   -No other lesions of concern on areas examined.           Impression/Plan:    1. Neoplasm of uncertain behavior on the left nose. The differential diagnosis includes dermal nevus, versus fibrous papule.   - Shave biopsy:  After discussion of benefits and risks including but not limited to bleeding/bruising, pain/swelling, infection, scar, incomplete removal, nerve damage/numbness, recurrence, and non-diagnostic biopsy, written consent, verbal consent and photographs were obtained. Time-out was performed. The area was cleaned with isopropyl alcohol. 0.5ml of 1% lidocaine with 1:100,000 epinephrine was injected to obtain adequate anesthesia. A shave biopsy was performed. Hemostasis was achieved with aluminium chloride. Vaseline and a sterile dressing were applied. The patient tolerated the procedure and no  complications were noted. The patient was provided with verbal and written post care instructions.    2. Multiple clinically benign nevi on the face.   - ABCDs of melanoma were discussed and self skin checks were advised.     CC Manjinder Rodriguez MD  [facility] [place] [address] on close of this encounter.    Follow-up prn.       Staff Involved:  Staff Only    Filipe Mckeon MD    Department of Dermatology  [facility]: Phone: [phone], Fax:[phone]  [facility]: Phone: [phone], Fax: [phone]

## 2019-12-12 NOTE — PROGRESS NOTES
[facility] Dermatology Note      Dermatology Problem List:  1.NUB, L nose. Ddx dermal nevus, fibrous papule. S/p shave bx 12/12/19.     CC:   Chief Complaint   Patient presents with     Derm Problem     Patient is here today for a lesion on left nasal side wall. No personal history of skin cancer.        Encounter Date: Dec 12, 2019    History of Present Illness:  Mr. Dalton Tillman is a 38 year old male who presents in referral for a spot on his nose. He has noticed an enlarging, skin-colored bump on his left nose for about 3 years. Lesion has been mildly itchy and he is bothered by appearance. He has had a mole on his scalp removed by his PCP prior.    The patient otherwise denies any new or concerning lesions. No bleeding, painful, pruritic, or changing lesions. They report no personal history of skin cancer. There is no family history of skin cancer. No history of immunosuppression. No history of indoor tanning. They do use sunscreen and protective clothing when outdoors for sun protection. There is some occupational exposure to ultraviolet light or other forms of radiation. 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 Health otherwise stable. No other skin concerns.     Past Medical History:   Patient Active Problem List   Diagnosis     CARDIOVASCULAR SCREENING; LDL GOAL LESS THAN 160     No past medical history on file.  No past surgical history on file.    Social History:  Patient reports that he has been smoking. He has never used smokeless tobacco. He reports current alcohol use. He reports current drug use.    Family History:  Family History   Problem Relation Age of Onset     Prostate Cancer Maternal Grandfather      C.A.D. Father         MI, 50       Medications:  Current Outpatient Medications   Medication Sig Dispense Refill     imiquimod (ALDARA) 5 % external cream Apply a small sized amount to warts or molluscum three times weekly at bedtime.   Wash off after 8 hours.   May use for  up to 16 weeks. 12 packet 3     No Known Allergies    Review of Systems:  -Skin Establ Pt: The patient denies any new rash, pruritus, or lesions that are symptomatic, changing or bleeding, except as per HPI.  -Constitutional: Otherwise feeling well today, in usual state of health.  -HEENT: Patient denies nonhealing oral sores.  -Skin: As above in HPI. No additional skin concerns.    Physical exam:  Vitals: There were no vitals taken for this visit.  GEN: This is a well developed, well-nourished male in no acute distress, in a pleasant mood.    SKIN: Focused examination of the face and neck was performed.  -Stephen skin type: II  - Brown macules and papules on scalp and face without concerning dermoscopy features.  - On the left nose, there is a ~6 mm skin-colored papule with dilated blood vessels, non-specific, under dermoscopy.   -No other lesions of concern on areas examined.           Impression/Plan:    1. Neoplasm of uncertain behavior on the left nose. The differential diagnosis includes dermal nevus, versus fibrous papule.   - Shave biopsy:  After discussion of benefits and risks including but not limited to bleeding/bruising, pain/swelling, infection, scar, incomplete removal, nerve damage/numbness, recurrence, and non-diagnostic biopsy, written consent, verbal consent and photographs were obtained. Time-out was performed. The area was cleaned with isopropyl alcohol. 0.5ml of 1% lidocaine with 1:100,000 epinephrine was injected to obtain adequate anesthesia. A shave biopsy was performed. Hemostasis was achieved with aluminium chloride. Vaseline and a sterile dressing were applied. The patient tolerated the procedure and no complications were noted. The patient was provided with verbal and written post care instructions.    2. Multiple clinically benign nevi on the face.   - ABCDs of melanoma were discussed and self skin checks were advised.     CC Manjinder Rodriguez MD  [id number] [place] [address] on close of this encounter.    Follow-up prn.       Staff Involved:  Staff Only    Filipe Mckeon MD    Department of Dermatology  [facility]: Phone: [phone], Fax:[phone]  [facility]: Phone: [phone], Fax: [phone]

## 2019-12-16 ENCOUNTER — TELEPHONE (OUTPATIENT)
Dept: DERMATOLOGY | Facility: CLINIC | Age: 39
End: 2019-12-16

## 2019-12-16 LAB — COPATH REPORT: NORMAL

## 2019-12-16 NOTE — TELEPHONE ENCOUNTER
[facility]    Phone Message    May a detailed message be left on voicemail: yes    Reason for Call: Other: Pt called and said that he got a missed call from Kate.  Please call the pt back as soon as you are available. Thanks     Action Taken: Message routed to:  [facility] ([facility] derm clinic

## 2019-12-16 NOTE — RESULT ENCOUNTER NOTE
Benign dermal nevus as expected.     Could you call patient to let him know - no further treatment needed. He can follow-up as needed. Thanks!    Filipe Mckeon MD    Department of Dermatology  [facility]: Phone: [phone], Fax:[phone]  [facility]: Phone: [phone], Fax: [phone]

## 2019-12-27 ENCOUNTER — ALLIED HEALTH/NURSE VISIT (OUTPATIENT)
Dept: NURSING | Facility: CLINIC | Age: 39
End: 2019-12-27
Payer: COMMERCIAL

## 2019-12-27 VITALS — SYSTOLIC BLOOD PRESSURE: 118 MMHG | DIASTOLIC BLOOD PRESSURE: 80 MMHG

## 2019-12-27 DIAGNOSIS — Z01.30 BP CHECK: Primary | ICD-10-CM

## 2019-12-27 PROCEDURE — 99207 ZZC NO CHARGE NURSE ONLY: CPT

## 2020-11-12 ENCOUNTER — VIRTUAL VISIT (OUTPATIENT)
Dept: FAMILY MEDICINE | Facility: OTHER | Age: 40
End: 2020-11-12
Payer: COMMERCIAL

## 2020-11-12 PROCEDURE — 99421 OL DIG E/M SVC 5-10 MIN: CPT | Performed by: FAMILY MEDICINE

## 2020-11-12 NOTE — PROGRESS NOTES
"Date: 2020 09:56:51  Clinician: Junior Stevens  Clinician NPI: [id number]  Patient: Dalton Tillman  Patient : 1980  Patient Address: [address] [place] [address]  Patient Phone: [phone]  Visit Protocol: URI  Patient Summary:  Dalton is a 39 year old ( : 1980 ) male who initiated a [facility] Visit for COVID-19 (Coronavirus) evaluation and screening. When asked the question \"Please sign me up to receive news, health information and promotions from [facility].\", Dalton responded \"Yes\".    Dalton states his symptoms started suddenly 3-4 days ago.   His symptoms consist of rhinitis, myalgia, malaise, a sore throat, a headache, a cough, and nasal congestion.   Symptom details     Nasal secretions: The color of his mucus is clear.    Cough: Dalton coughs a few times an hour and his cough is more bothersome at night. Phlegm does not come into his throat when he coughs. He does not believe his cough is caused by post-nasal drip.     Sore throat: Dalton reports having mild throat pain (1-3 on a 10 point pain scale), does not have exudate on his tonsils, and can swallow liquids. The lymph nodes in his neck are not enlarged. A rash has not appeared on the skin since the sore throat started.     Headache: He states the headache is mild (1-3 on a 10 point pain scale).      Dalton denies having vomiting, facial pain or pressure, chills, teeth pain, ageusia, diarrhea, ear pain, wheezing, fever, enlarged lymph nodes, nausea, and anosmia. He also denies taking antibiotic medication in the past month, having recent facial or sinus surgery in the past 60 days, and double sickening (worsening symptoms after initial improvement). He is not experiencing dyspnea.   Precipitating events  Dalton is not sure if he has been exposed to someone with strep throat. He has not recently been exposed to someone with influenza. Dalton has not been in close contact with any high risk individuals.   Pertinent " COVID-19 (Coronavirus) information  Dalton does not work or volunteer as healthcare worker or a . In the past 14 days, Dalton has not worked or volunteered at a healthcare facility or group living setting.   In the past 14 days, he also has not lived in a congregate living setting.   Dalton has not had a close contact with a laboratory-confirmed COVID-19 patient within 14 days of symptom onset.    Since December 2019, Dalton has not been tested for COVID-19 and has not had upper respiratory infection or influenza-like illness.   Pertinent medical history  Dalton needs a return to work/school note.   Weight: 170 lbs   Dalton does not smoke or use smokeless tobacco.   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 I ask customers if they have had exposure to Covid or if they are experiencing flu like symptoms before I enter their home. I wear a mask when inside or around the outside of the customer's home but not all customers wear masks when I visit. I have not reported to work since symptoms began. Last day I worked was 11/8/20.   Weight: 170 lbs    MEDICATIONS: No current medications, ALLERGIES: NKDA  Clinician Response:  Dear Dalton,   Your symptoms show that you may have coronavirus (COVID-19). This illness can cause fever, cough and trouble breathing. Many people get a mild case and get better on their own. Some people can get very sick.  What should I do?  We would like to test you for this virus.   1. Please call [phone] to schedule your visit. Explain that you were referred by [facility] to have a COVID-19 test. Be ready to share your [facility] visit ID number.  * If you need to schedule [facility] please call [phone] or for [employer] [place] employees please call [phone].  * If you need to schedule in the [place] area please call [phone]. [place]  "employees call [phone].  The following will serve as your written order for this COVID Test, ordered by me, for the indication of suspected COVID [Z20.828]: The test will be ordered in [employer], our electronic health record, after you are scheduled. It will show as ordered and authorized by Van Read MD.  Order: COVID-19 (Coronavirus) PCR for SYMPTOMATIC testing from [facility].   2. When it's time for your COVID test:  Stay at least 6 feet away from others. (If someone will drive you to your test, stay in the backseat, as far away from the  as you can.)   Cover your mouth and nose with a mask, tissue or washcloth.  Go straight to the testing site. Don't make any stops on the way there or back.      3.Starting now: Stay home and away from others (self-isolate) until:   You've had no fever---and no medicine that reduces fever---for one full day (24 hours). And...   Your other symptoms have gotten better. For example, your cough or breathing has improved. And...   At least 10 days have passed since your symptoms started.       During this time, don't leave the house except for testing or medical care.   Stay in your own room, even for meals. Use your own bathroom if you can.   Stay away from others in your home. No hugging, kissing or shaking hands. No visitors.  Don't go to work, school or anywhere else.    Clean \"high touch\" surfaces often (doorknobs, counters, handles, etc.). Use a household cleaning spray or wipes. You'll find a full list of  on the EPA website: www.epa.gov/pesticide-registration/list-n-disinfectants-use-against-sars-cov-2.   Cover your mouth and nose with a mask, tissue or washcloth to avoid spreading germs.  Wash your hands and face often. Use soap and water.  Caregivers in these groups are at risk for severe illness due to COVID-19:  o People 65 years and older  o People who live in a nursing home or long-term care facility  o People with chronic disease (lung, heart, cancer, " diabetes, kidney, liver, immunologic)  o People who have a weakened immune system, including those who:   Are in cancer treatment  Take medicine that weakens the immune system, such as corticosteroids  Had a bone marrow or organ transplant  Have an immune deficiency  Have poorly controlled HIV or AIDS  Are obese (body mass index of 40 or higher)  Smoke regularly   o Caregivers should wear gloves while washing dishes, handling laundry and cleaning bedrooms and bathrooms.  o Use caution when washing and drying laundry: Don't shake dirty laundry, and use the warmest water setting that you can.  o For more tips, go to www.cdc.gov/coronavirus/2019-ncov/downloads/10Things.pdf.    4.Sign up for [employer]. We know it's scary to hear that you might have COVID-19. We want to track your symptoms to make sure you're okay over the next 2 weeks. Please look for an email from [employer]---this is a free, online program that we'll use to keep in touch. To sign up, follow the link in the email. Learn more at [email or web address]  How can I take care of myself?   Get lots of rest. Drink extra fluids (unless a doctor has told you not to).   Take Tylenol (acetaminophen) for fever or pain. If you have liver or kidney problems, ask your family doctor if it's okay to take Tylenol.   Adults can take either:    650 mg (two 325 mg pills) every 4 to 6 hours, or...   1,000 mg (two 500 mg pills) every 8 hours as needed.    Note: Don't take more than 3,000 mg in one day. Acetaminophen is found in many medicines (both prescribed and over-the-counter medicines). Read all labels to be sure you don't take too much.   For children, check the Tylenol bottle for the right dose. The dose is based on the child's age or weight.    If you have other health problems (like cancer, heart failure, an organ transplant or severe kidney disease): Call your specialty clinic if you don't feel better in the next 2 days.       Know when to call 911.  Emergency warning signs include:    Trouble breathing or shortness of breath Pain or pressure in the chest that doesn't go away Feeling confused like you haven't felt before, or not being able to wake up Bluish-colored lips or face.  Where can I get more information?   [facility] -- About COVID-19: [email or web address]   CDC -- What to Do If You're Sick: www.cdc.gov/coronavirus/2019-ncov/about/steps-when-sick.html   CDC -- Ending Home Isolation: www.cdc.gov/coronavirus/2019-ncov/hcp/disposition-in-home-patients.html   [facility] [place] -- Caring for Someone: www.cdc.gov/coronavirus/2019-ncov/if-you-are-sick/care-for-someone.html   [facility] -- Interim Guidance for Hospital Discharge to Home: www.health.[facility].[place]./diseases/coronavirus/hcp/hospdischarge.pdf   [facility] clinical trials (COVID-19 research studies): clinicalaffairs.[facility]-clinical-trials    Below are the COVID-19 hotlines at the [place] Department of Health ([facility]). Interpreters are available.    For health questions: Call 215-050-2605 or 1-800.550.3446 (7 a.m. to 7 p.m.) For questions about schools and childcare: Call 452-774-3879 or 1-220.613.1629 (7 a.m. to 7 p.m.)    Diagnosis: Contact with and (suspected) exposure to other viral communicable diseases  Diagnosis ICD: Z20.828

## 2020-11-14 DIAGNOSIS — Z20.822 SUSPECTED COVID-19 VIRUS INFECTION: Primary | ICD-10-CM

## 2020-11-15 DIAGNOSIS — Z20.822 SUSPECTED COVID-19 VIRUS INFECTION: ICD-10-CM

## 2020-11-15 PROCEDURE — U0003 INFECTIOUS AGENT DETECTION BY NUCLEIC ACID (DNA OR RNA); SEVERE ACUTE RESPIRATORY SYNDROME CORONAVIRUS 2 (SARS-COV-2) (CORONAVIRUS DISEASE [COVID-19]), AMPLIFIED PROBE TECHNIQUE, MAKING USE OF HIGH THROUGHPUT TECHNOLOGIES AS DESCRIBED BY CMS-2020-01-R: HCPCS | Performed by: FAMILY MEDICINE

## 2020-11-16 LAB
SARS-COV-2 RNA SPEC QL NAA+PROBE: NOT DETECTED
SPECIMEN SOURCE: NORMAL

## 2020-11-18 ENCOUNTER — ALLIED HEALTH/NURSE VISIT (OUTPATIENT)
Dept: FAMILY MEDICINE | Facility: CLINIC | Age: 40
End: 2020-11-18
Payer: COMMERCIAL

## 2020-11-18 DIAGNOSIS — Z23 NEED FOR PROPHYLACTIC VACCINATION AND INOCULATION AGAINST INFLUENZA: Primary | ICD-10-CM

## 2020-11-18 PROCEDURE — 90471 IMMUNIZATION ADMIN: CPT

## 2020-11-18 PROCEDURE — 90686 IIV4 VACC NO PRSV 0.5 ML IM: CPT

## 2020-11-18 PROCEDURE — 99207 PR NO CHARGE NURSE ONLY: CPT

## 2020-12-14 ENCOUNTER — HEALTH MAINTENANCE LETTER (OUTPATIENT)
Age: 40
End: 2020-12-14

## 2021-01-27 ENCOUNTER — VIRTUAL VISIT (OUTPATIENT)
Dept: FAMILY MEDICINE | Facility: CLINIC | Age: 41
End: 2021-01-27
Payer: COMMERCIAL

## 2021-01-27 DIAGNOSIS — Z20.822 SUSPECTED COVID-19 VIRUS INFECTION: Primary | ICD-10-CM

## 2021-01-27 PROCEDURE — 99213 OFFICE O/P EST LOW 20 MIN: CPT | Mod: 95 | Performed by: FAMILY MEDICINE

## 2021-01-27 NOTE — PROGRESS NOTES
Charanjit is a 40 year old who is being evaluated via a billable video visit.      How would you like to obtain your AVS? [employer]  If the video visit is dropped, the invitation should be resent by: Text to cell phone: [phone]  Will anyone else be joining your video visit? No      Video Start Time: 10:12 AM  Assessment & Plan   Dalton 40 year old male - with on going body aches- chills and exposures to infection.  Suspected COVID-19 virus infection  - Symptomatic COVID-19 Virus (Coronavirus) by PCR  Relative rest. Get test completed- ok to return to work if negative  Keep up with hydration, improve nutrition and advance diet as tolerated  Continue with covid precautions  He has been home since been sick.  Letter given- will access it via [employer]  Review of external notes as documented above           30 minutes spent on the date of the encounter doing chart review, history and exam, documentation and further activities as noted above        Return in about 1 week (around 2/3/2021) for concerns,unresolved.    Loretta Long MD  [facility]     Charanjit is a 40 year old who presents to clinic today for the following health issues     HPI       Concern - - chills body aches, better now but needs a RTW note  Onset: 2 days ago  Description: chills  Intensity: moderate  Progression of Symptoms:  improving  Accompanying Signs & Symptoms:   Previous history of similar problem:   Precipitating factors:        Worsened by:   Alleviating factors:        Improved by: rest  Therapies tried and outcome: None  Woke up the night before last  with cold sweats and chills. Had eaten chilies. Wife had same - no vomiting- ? Food poisoning.  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   Experienced vomiting for a couple hours in the morning.   Called in sick to work.Nausea has subsided today, body ache persists. chills also on but better- maybe slight fever- unsure.  Need doctor's  note to return to work.  Wife works from  Home  No symptoms  [employer] strict policy to have negative COVID test before able to return to work   Most likely exposure to covid there- people not always forth coming about there symptoms and flu like exposures.      Review of Systems   Constitutional, HEENT, cardiovascular, pulmonary, GI, , musculoskeletal, neuro, skin, endocrine and psych systems are negative, except as otherwise noted.      Objective    Vitals - Patient Reported  Weight (Patient Reported): 77.1 kg (170 lb)        Physical Exam   GENERAL: Healthy, alert and no distress  EYES: Eyes grossly normal to inspection.  No discharge or erythema, or obvious scleral/conjunctival abnormalities.  RESP: No audible wheeze, cough, or visible cyanosis.  No visible retractions or increased work of breathing.    SKIN: Visible skin clear. No significant rash, abnormal pigmentation or lesions.  NEURO: Cranial nerves grossly intact.  Mentation and speech appropriate for age.  PSYCH: Mentation appears normal, affect normal/bright, judgement and insight intact, normal speech and appearance well-groomed.                Video-Visit Details    Type of service:  Video Visit    Video End Time:10:28 AM    Originating Location (pt. Location): Home    Distant Location (provider location):  [facility]     Platform used for Video Visit: Oris[employer]

## 2021-01-27 NOTE — NURSING NOTE
"Chief Complaint   Patient presents with     Chills     2 night ago, needs RTW note     initial There were no vitals taken for this visit. Estimated body mass index is 26.68 kg/m  as calculated from the following:    Height as of 11/8/19: 1.727 m (5' 8\").    Weight as of 11/8/19: 79.6 kg (175 lb 8 oz).  BP completed using cuff size: .  L  R arm      Health Maintenance that is potentially due pending provider review:    Rigoberto Garcia ma  "

## 2021-01-27 NOTE — LETTER
FOR:[employer]        RE: Dalton Tillman  : 1980              Dalton  is seen today  As video virtual visit 21.  He is to return to work unrestricted 21, with negative COVID PCR.    Please keep us posted with questions or concerns .      Best Regards,          Loretta Long MD

## 2021-01-28 ENCOUNTER — OFFICE VISIT (OUTPATIENT)
Dept: URGENT CARE | Facility: URGENT CARE | Age: 41
End: 2021-01-28
Attending: FAMILY MEDICINE
Payer: COMMERCIAL

## 2021-01-28 DIAGNOSIS — Z20.822 SUSPECTED COVID-19 VIRUS INFECTION: ICD-10-CM

## 2021-01-28 LAB
LABORATORY COMMENT REPORT: NORMAL
SARS-COV-2 RNA RESP QL NAA+PROBE: NEGATIVE
SARS-COV-2 RNA RESP QL NAA+PROBE: NORMAL
SPECIMEN SOURCE: NORMAL
SPECIMEN SOURCE: NORMAL

## 2021-01-28 PROCEDURE — U0003 INFECTIOUS AGENT DETECTION BY NUCLEIC ACID (DNA OR RNA); SEVERE ACUTE RESPIRATORY SYNDROME CORONAVIRUS 2 (SARS-COV-2) (CORONAVIRUS DISEASE [COVID-19]), AMPLIFIED PROBE TECHNIQUE, MAKING USE OF HIGH THROUGHPUT TECHNOLOGIES AS DESCRIBED BY CMS-2020-01-R: HCPCS | Performed by: FAMILY MEDICINE

## 2021-01-28 PROCEDURE — U0005 INFEC AGEN DETEC AMPLI PROBE: HCPCS | Performed by: FAMILY MEDICINE

## 2021-03-08 NOTE — PROGRESS NOTES
Dalton is a 40 year old who is being evaluated via a billable video visit.      How would you like to obtain your AVS? MyChart  If the video visit is dropped, the invitation should be resent by: Text to cell phone: [phone]  Will anyone else be joining your video visit? No      Video Start Time: 7:00    Assessment & Plan     Acute left-sided low back pain with left-sided sciatica  - MR Lumbar Spine w/o Contrast; Future  - MR Lumbosacral Plexus w/o Contrast; Future    Lumbosacral radiculopathy  - MR Lumbar Spine w/o Contrast; Future  - MR Lumbosacral Plexus w/o Contrast; Future  - predniSONE (DELTASONE) 20 MG tablet; Take 2 tablets (40 mg) by mouth daily for 5 days    His symptoms are consistent with a lumbosacral radiculopathy.  We discussed treatment options and he was given a prescription for 5-day course of prednisone.  He may continue ibuprofen as needed.  We discussed imaging strategies and I recommended he have an MRI for further evaluation.  Once these results are back we can further develop her treatment plan.  I anticipate he will need physical therapy as part of this.  I put this order in today as well.                 Return in about 2 weeks (around 3/23/2021) for Follow up if symptoms do not improve.    Kp Duenas [facility]   Dalton is a 40 year old who presents for the following health issues     HPI     He describes waking up from a nap on 2/26/2021 with acute pain in the left buttock that was radiating into the thigh.  Since that time he feels like symptoms have been getting worse and he is having pain radiating consistently down his leg.  Over the past week symptoms have been radiating into the calf.  He denies specific muscle weakness.  He denies any specific injury that may have caused this.  He has never had symptoms like this before.  Symptoms are worse with prolonged sitting, walking or transitioning from sitting to standing.  There is  twisting motions also cause discomfort.  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  Heavy lifting has been causing worsening pain as well.  Ibuprofen has provided some minimal relief of symptoms.  He denies having history of fractures or surgery that involve the back.            Review of Systems   Constitutional, HEENT, cardiovascular, pulmonary, gi and gu systems are negative, except as otherwise noted.      Objective    Vitals - Patient Reported  Weight (Patient Reported): 79.4 kg (175 lb)      Vitals:  No vitals were obtained today due to virtual visit.    Physical Exam   GENERAL: Healthy, alert and no distress  EYES: Eyes grossly normal to inspection.  No discharge or erythema, or obvious scleral/conjunctival abnormalities.  RESP: No audible wheeze, cough, or visible cyanosis.  No visible retractions or increased work of breathing.    SKIN: Visible skin clear. No significant rash, abnormal pigmentation or lesions.  NEURO: Cranial nerves grossly intact.  Mentation and speech appropriate for age.  PSYCH: Mentation appears normal, affect normal/bright, judgement and insight intact, normal speech and appearance well-groomed.                Video-Visit Details    Type of service:  Video Visit    Video End Time:7: 25    Originating Location (pt. Location): Home    Distant Location (provider location):  [facility]     Platform used for Video Visit: [employer]

## 2021-03-09 ENCOUNTER — VIRTUAL VISIT (OUTPATIENT)
Dept: FAMILY MEDICINE | Facility: CLINIC | Age: 41
End: 2021-03-09
Payer: COMMERCIAL

## 2021-03-09 ENCOUNTER — TELEPHONE (OUTPATIENT)
Dept: FAMILY MEDICINE | Facility: CLINIC | Age: 41
End: 2021-03-09

## 2021-03-09 DIAGNOSIS — M54.42 ACUTE LEFT-SIDED LOW BACK PAIN WITH LEFT-SIDED SCIATICA: Primary | ICD-10-CM

## 2021-03-09 DIAGNOSIS — M54.17 LUMBOSACRAL RADICULOPATHY: ICD-10-CM

## 2021-03-09 PROCEDURE — 99214 OFFICE O/P EST MOD 30 MIN: CPT | Mod: 95 | Performed by: FAMILY MEDICINE

## 2021-03-09 RX ORDER — PREDNISONE 20 MG/1
40 TABLET ORAL DAILY
Qty: 10 TABLET | Refills: 0 | Status: SHIPPED | OUTPATIENT
Start: 2021-03-09 | End: 2021-03-15

## 2021-03-09 NOTE — TELEPHONE ENCOUNTER
----- Message from Kp Duenas DO sent at 3/9/2021  7:31 AM CST -----  Regarding: Needs MRI Scheduled  Please help this patient schedule the MRI of his lumbar and sacrum.  Hopefully he can get this today or tomorrow.  Thank you, DE

## 2021-03-09 NOTE — NURSING NOTE
"Chief Complaint   Patient presents with     Pain     from butt to leg     initial There were no vitals taken for this visit. Estimated body mass index is 26.68 kg/m  as calculated from the following:    Height as of 11/8/19: 1.727 m (5' 8\").    Weight as of 11/8/19: 79.6 kg (175 lb 8 oz).  BP completed using cuff size: .  L  R arm      Health Maintenance that is potentially due pending provider review:      Rigoberto Garcia ma  "

## 2021-03-09 NOTE — TELEPHONE ENCOUNTER
----- Message from Kp Duenas DO sent at 3/9/2021 10:44 AM CST -----  Regarding: Needs Appt  Please contact this patient and let him know that his insurance denied the MRI that was ordered.  I recommend that he schedule an office appointment to be evaluated and we can check an x-ray.  His insurance company is likely requesting x-ray imaging and possibly a face-to-face examination before pursuing the MRI.  I have some openings tomorrow morning if he would like to come in for an appointment.  Thank you, DE

## 2021-03-10 ENCOUNTER — HOSPITAL ENCOUNTER (OUTPATIENT)
Dept: MRI IMAGING | Facility: CLINIC | Age: 41
End: 2021-03-10
Attending: FAMILY MEDICINE
Payer: COMMERCIAL

## 2021-03-10 ENCOUNTER — ANCILLARY PROCEDURE (OUTPATIENT)
Dept: GENERAL RADIOLOGY | Facility: CLINIC | Age: 41
End: 2021-03-10
Attending: FAMILY MEDICINE
Payer: COMMERCIAL

## 2021-03-10 ENCOUNTER — OFFICE VISIT (OUTPATIENT)
Dept: FAMILY MEDICINE | Facility: CLINIC | Age: 41
End: 2021-03-10
Payer: COMMERCIAL

## 2021-03-10 VITALS
HEIGHT: 68 IN | BODY MASS INDEX: 27.43 KG/M2 | TEMPERATURE: 96.8 F | WEIGHT: 181 LBS | SYSTOLIC BLOOD PRESSURE: 133 MMHG | HEART RATE: 68 BPM | RESPIRATION RATE: 20 BRPM | DIASTOLIC BLOOD PRESSURE: 86 MMHG | OXYGEN SATURATION: 97 %

## 2021-03-10 DIAGNOSIS — M54.42 ACUTE LEFT-SIDED LOW BACK PAIN WITH LEFT-SIDED SCIATICA: ICD-10-CM

## 2021-03-10 DIAGNOSIS — M54.17 LUMBOSACRAL RADICULOPATHY: ICD-10-CM

## 2021-03-10 DIAGNOSIS — M54.17 LUMBOSACRAL RADICULOPATHY: Primary | ICD-10-CM

## 2021-03-10 PROCEDURE — 99214 OFFICE O/P EST MOD 30 MIN: CPT | Performed by: FAMILY MEDICINE

## 2021-03-10 PROCEDURE — 72202 X-RAY EXAM SI JOINTS 3/> VWS: CPT | Mod: FY | Performed by: RADIOLOGY

## 2021-03-10 PROCEDURE — 72148 MRI LUMBAR SPINE W/O DYE: CPT | Mod: XS

## 2021-03-10 PROCEDURE — 72100 X-RAY EXAM L-S SPINE 2/3 VWS: CPT | Mod: FY | Performed by: RADIOLOGY

## 2021-03-10 PROCEDURE — 72148 MRI LUMBAR SPINE W/O DYE: CPT

## 2021-03-10 ASSESSMENT — MIFFLIN-ST. JEOR: SCORE: 1710.51

## 2021-03-10 NOTE — PROGRESS NOTES
Assessment & Plan     Lumbosacral radiculopathy  I recommended we check an xray of the lumbar sacrum today. I personally reviewed the images with him and provided an initial interpretation which show mild vertebral narrowing at L5-S1, but no fractures or significant alignment abnormalities. I recommended we pursue the MRI given the worsening radicular symptoms he is experiencing. I did a peer to peer review with his insurance company in order to get the MRI covered. He will continue the prednisone and we can further develop our treatment plan once the results are back.    - XR Lumbar Spine 2/3 Views; Future  - XR Sacroiliac Joint G/E 3 Views; Future      Addendum: I reviewed the MRI results with him which show a disc protrusion and evidence of S1 nerve impingement.  He was given a referral to see a spine specialist as soon as possible.  He will continue the prednisone and ibuprofen.       No follow-ups on file.    Kp Duenas [facility]   Dalton is a 40 year old who presents for the following health issues     HPI       Musculoskeletal problem/pain  Onset/Duration: 2/26/21  Description  Location: buttock - left, radiating to thigh and calf   Joint Swelling: no  Redness: no  Pain: YES - constant throbbing, sharp pain with movement  Warmth: no  Intensity:  Moderate, 7/10 at worst  Progression of Symptoms:  worsening  Accompanying signs and symptoms:   Fevers: no  Numbness/tingling/weakness: no  History  Trauma to the area: no  Recent illness:  no  Previous similar problem: no  Previous evaluation:  no  Precipitating or alleviating factors:  Aggravating factors include: sitting, walking, lifting, exercise and overuse, transitioning from sitting to standing   Therapies tried and outcome: rest/inactivity, stretching, exercises - not helpful         He continues to struggle with pain in his left buttock region which has been radiating down the thigh and into the mid  "calf since 2/26/2021.  He feels like symptoms are getting worse. The left leg is starting to feel weak.  Prolonged sitting, standing or walking worsens symptoms.  He was seen yesterday for this and was started on prednisone.  He is taken 2 doses so far, but symptoms do not seem to be improving.  He is also taking NSAIDs without significant relief of symptoms.  He denies any history of fractures or surgery that involve the back or buttock region.  He denies any specific injury that may have caused this.  He woke up from a nap on 2/26/2021 with the onset of pain in the left buttock radiating into the thigh.  He is having trouble ambulating because of this.  He does not have a rash.    Review of Systems   Constitutional, HEENT, cardiovascular, pulmonary, GI, , musculoskeletal, neuro, skin, endocrine and psych systems are negative, except as otherwise noted.      Objective    /86 (Patient Position: Sitting, Cuff Size: Adult Regular)   Pulse 68   Temp 96.8  F (36  C) (Tympanic)   Resp 20   Ht 1.727 m (5' 8\")   Wt 82.1 kg (181 lb)   SpO2 97%   BMI 27.52 kg/m    Body mass index is 27.52 kg/m .  Physical Exam   GENERAL: healthy, alert and no distress  EYES: Eyes grossly normal to inspection, PERRL and conjunctivae and sclerae normal  NECK: no adenopathy, no asymmetry, masses, or scars and thyroid normal to palpation  RESP: lungs clear to auscultation - no rales, rhonchi or wheezes  CV: regular rate and rhythm, normal S1 S2, no S3 or S4, no murmur, click or rub, no peripheral edema and peripheral pulses strong  MS: Nontender over the cervical, thoracic and lumbar spine.  Nontender over the paraspinal muscles throughout the thoracolumbar region.  There is mild tenderness in the left SI joint and surrounding musculature.  Straight leg raise positive on the left at 45 degrees.  Internal and external hip rotation normal bilaterally.Slight weakness present in LLE.  SKIN: no suspicious lesions or rashes  NEURO: " Normal strength and tone, mentation intact and speech normal.  Deep tendon reflexes 24 bilaterally.  PSYCH: mentation appears normal

## 2021-03-11 ENCOUNTER — TELEPHONE (OUTPATIENT)
Dept: FAMILY MEDICINE | Facility: CLINIC | Age: 41
End: 2021-03-11

## 2021-03-11 NOTE — TELEPHONE ENCOUNTER
DE    [facility] [facility] [place] [facility] denied MRI done one March 10th    Insurance says: Doesn't meet medical necessity     [employer] is requesting the provider reach out to them and discuss this so they can try to get this approved for the patient.     Naseem with information placed on your desk. Copies made and sent to scanning.    Thank you,  Telma Nicole RN

## 2021-03-12 NOTE — TELEPHONE ENCOUNTER
DE,   Called [employer] at below #  Rep said he sees peer review and MRI was approved within dates of 3/9/2021-5/7/2021  Reference # if needed is [id number]  Julia ROCKWELL RN

## 2021-03-12 NOTE — TELEPHONE ENCOUNTER
I also received a call from [facility] ([facility]), spoke to rep Ordaz (male) who says the P/A request for MRI is denied as patient would need to have at least 6 weeks of home exercises or physical therapy.   Referral # [id number]  Any questions can call back to [phone]

## 2021-03-12 NOTE — TELEPHONE ENCOUNTER
I am not sure why we are getting messages that this was denied.  I already did a peer to peer visit with one of the insurance physicians who provided the approval over the phone the morning before he had the MRI done on 3/10.  This patient has been having worsening radicular symptoms and the MRI shows evidence of nerve impingement that needs to be evaluated by an orthopedic spine surgeon.  I asked one of our triage nurses yesterday to contact the insurance company to find out what is going on.  -Dr. Kp Duenas, DO

## 2021-03-15 ENCOUNTER — OFFICE VISIT (OUTPATIENT)
Dept: NEUROSURGERY | Facility: CLINIC | Age: 41
End: 2021-03-15
Attending: NEUROLOGICAL SURGERY
Payer: COMMERCIAL

## 2021-03-15 ENCOUNTER — TELEPHONE (OUTPATIENT)
Dept: FAMILY MEDICINE | Facility: CLINIC | Age: 41
End: 2021-03-15

## 2021-03-15 VITALS
WEIGHT: 181 LBS | HEIGHT: 68 IN | BODY MASS INDEX: 27.43 KG/M2 | DIASTOLIC BLOOD PRESSURE: 98 MMHG | SYSTOLIC BLOOD PRESSURE: 141 MMHG | HEART RATE: 66 BPM | OXYGEN SATURATION: 99 %

## 2021-03-15 DIAGNOSIS — M54.17 LUMBOSACRAL RADICULOPATHY: ICD-10-CM

## 2021-03-15 DIAGNOSIS — M54.16 LUMBAR RADICULOPATHY: Primary | ICD-10-CM

## 2021-03-15 PROCEDURE — G0463 HOSPITAL OUTPT CLINIC VISIT: HCPCS

## 2021-03-15 PROCEDURE — 99203 OFFICE O/P NEW LOW 30 MIN: CPT | Performed by: PHYSICIAN ASSISTANT

## 2021-03-15 RX ORDER — OMEGA-3 FATTY ACIDS/FISH OIL 300-1000MG
200 CAPSULE ORAL EVERY 4 HOURS PRN
Status: ON HOLD | COMMUNITY
End: 2021-05-19

## 2021-03-15 ASSESSMENT — MIFFLIN-ST. JEOR: SCORE: 1710.51

## 2021-03-15 ASSESSMENT — PAIN SCALES - GENERAL: PAINLEVEL: MODERATE PAIN (5)

## 2021-03-15 NOTE — TELEPHONE ENCOUNTER
Reason for Call:  Form, our goal is to have forms completed with 72 hours, however, some forms may require a visit or additional information.    Type of letter, form or note:  disabilty form    Who is the form from?: nallely (if other please explain)    Where did the form come from: form was faxed in    What clinic location was the form placed at?: [facility]    Where the form was placed: Given to physician    What number is listed as a contact on the form?:   Fax [phone]       Additional comments:     Call taken on 3/15/2021 at 4:45 PM by Judson Dumont

## 2021-03-15 NOTE — PROGRESS NOTES
Neurosurgery Consult    HPI    Mr. Tillman is a 40-year-old male referred to us for evaluation of left leg radiculopathy.  Symptoms began 3 weeks ago.  There was no particular event that brought him on he was simply sleeping and woke up with pain down his left leg terminating about in his calf and posterior S1 distribution.  He denies numbness or weakness.  He reports increased pain with coughing or sneezing and with bearing down for bowel movement.  Symptoms are worse with bending and twisting and walking.  He had a steroid taper prescribed with perhaps moderated the symptoms briefly but once he finished taking it his symptoms returned.    He denies any loss of bowel or bladder function, saddle anesthesia.  He denies any right-sided symptoms.    Medical history  No other medical issues    Social history  Works as a  doing heavy manual labor at times      B/P: 141/98, T: Data Unavailable, P: 66, R: Data Unavailable       Exam    Alert and oriented no acute distress  Bilateral lower extremities with 5/5 strength  Reflexes 2+ patella/ankle  Positive straight leg raise on the left, positive cross straight leg raise on the right  Negative ankle clonus negative Babinski bilaterally  Lumbar spine nontender to palpation  Able to stand on heels and toes  Gait is normal    Imaging    Lumbar MRI demonstrates a left L5-S1 disc herniation impinging upon the traversing left S1 nerve    Assessment    Left S1 radiculopathy  Left L5-S1 disc herniation    Plan:      I have scheduled the patient for a epidural steroid injection.  We will have him return to clinic next week to meet with Dr. Baker to begin tentative surgical planning should his injection not be effective.  He is currently 3 weeks into his symptoms, if his symptoms resolve at any point prior to potential surgery we would certainly delay or cancel surgery at that point however having him come in the clinic next week will help facilitate  timing of possible surgical intervention and avoid unnecessary delay.    Total time of 30 minutes spent with the patient today in counseling and coordination of care.

## 2021-03-15 NOTE — NURSING NOTE
"March 15, 2021 10:42 AM   Dalton Tillman is a 40 year old male who presents for:    Chief Complaint   Patient presents with     Consult     Lumbar radiculapathy     Referral     Dr Lashawn Duenas     Initial Vitals: BP (!) 149/100 (BP Location: Right arm)   Pulse 66   Ht 5' 8\" (1.727 m)   Wt 181 lb (82.1 kg)   SpO2 99%   BMI 27.52 kg/m   Estimated body mass index is 27.52 kg/m  as calculated from the following:    Height as of this encounter: 5' 8\" (1.727 m).    Weight as of this encounter: 181 lb (82.1 kg). Body surface area is 1.98 meters squared.  Moderate Pain (5) Comment: Data Unavailable       Clinical concerns: Dalton Tillman is here today for left leg pain.  Daya Ross MA  "

## 2021-03-15 NOTE — TELEPHONE ENCOUNTER
Reason for Call:  Form, our goal is to have forms completed with 72 hours, however, some forms may require a visit or additional information.    Type of letter, form or note:  ***    Who is the form from?: {FORM WHO2:412020}    Where did the form come from: {FROM WHERE2:097596}    What clinic location was the form placed at?: {[facility] List:146177}    Where the form was placed: {Placed:620411}    What number is listed as a contact on the form?: ***       Additional comments: ***    Call taken on 3/15/2021 at 4:41 PM by Judson Dumont

## 2021-03-15 NOTE — LETTER
3/15/2021         RE: Dalton Tillman  [address] [address]        Dear Colleague,    Thank you for referring your patient, Dalton Tillman, to the [facility] [place]. Please see a copy of my visit note below.    Neurosurgery Consult    HPI    Mr. Tillman is a 40-year-old male referred to us for evaluation of left leg radiculopathy.  Symptoms began 3 weeks ago.  There was no particular event that brought him on he was simply sleeping and woke up with pain down his left leg terminating about in his calf and posterior S1 distribution.  He denies numbness or weakness.  He reports increased pain with coughing or sneezing and with bearing down for bowel movement.  Symptoms are worse with bending and twisting and walking.  He had a steroid taper prescribed with perhaps moderated the symptoms briefly but once he finished taking it his symptoms returned.    He denies any loss of bowel or bladder function, saddle anesthesia.  He denies any right-sided symptoms.    Medical history  No other medical issues    Social history  Works as a  doing heavy manual labor at times      B/P: 141/98, T: Data Unavailable, P: 66, R: Data Unavailable       Exam    Alert and oriented no acute distress  Bilateral lower extremities with 5/5 strength  Reflexes 2+ patella/ankle  Positive straight leg raise on the left, positive cross straight leg raise on the right  Negative ankle clonus negative Babinski bilaterally  Lumbar spine nontender to palpation  Able to stand on heels and toes  Gait is normal    Imaging    Lumbar MRI demonstrates a left L5-S1 disc herniation impinging upon the traversing left S1 nerve    Assessment    Left S1 radiculopathy  Left L5-S1 disc herniation    Plan:      I have scheduled the patient for a epidural steroid injection.  We will have him return to clinic next week to meet with Dr. Baker to begin tentative surgical planning should his  injection not be effective.  He is currently 3 weeks into his symptoms, if his symptoms resolve at any point prior to potential surgery we would certainly delay or cancel surgery at that point however having him come in the clinic next week will help facilitate timing of possible surgical intervention and avoid unnecessary delay.    Total time of 30 minutes spent with the patient today in counseling and coordination of care.      Again, thank you for allowing me to participate in the care of your patient.        Sincerely,        Keon Avila PA-C

## 2021-03-16 ENCOUNTER — TELEPHONE (OUTPATIENT)
Dept: PALLIATIVE MEDICINE | Facility: CLINIC | Age: 41
End: 2021-03-16

## 2021-03-16 NOTE — TELEPHONE ENCOUNTER
Screening Questions for Radiology Injections:    Injection to be done at which interventional clinic site? [facility]    If [facility] location, tell patient that this procedure requires a COVID-19 lab test be done within 4 days of the procedure. Would you still like to move forward with scheduling the procedure?  Not Applicable   If YES, let patient know that someone will call them to schedule the COVID-19 test and that they will only receive a call back if the result is positive. Route to nursing to enter order.     Instruct patient to arrive as directed prior to the scheduled appointment time:    [place] minutes before      [place]: 30 minutes before; if IV needed 1 hour before     Procedure ordered by Margarita    Procedure ordered? LESI      Transforaminal Cervical SHAWANDA - no pain provider currently performing    As a reminder, receiving steroids can decrease your body's ability to fight infection.   Would you still like to move forward with scheduling the injection?  Yes    What insurance would patient like us to bill for this procedure? BCBS      Worker's comp or MVA (motor vehicle accident) -Any injection DO NOT SCHEDULE and route to Shonda Briseno.      [employer] insurance - For SI joint injections, DO NOT SCHEDULE and route Shonda Briseno.       ALL BCBS, [employer] and [employer]-Route to Shonda for review DO NOT SCHEDULE      IF SCHEDULING IN [place] AND NEEDS A PA, IT IS OKAY TO SCHEDULE. [place] HANDLES THEIR OWN PA'S AFTER THE PATIENT IS SCHEDULED. PLEASE SCHEDULE AT LEAST 1 WEEK OUT SO A PA CAN BE OBTAINED.    Any chance of pregnancy? Not Applicable   If YES, do NOT schedule and route to RN [place]    Is an  needed? No     Patient has a drive home? (mandatory) YES: INFORMED    Is patient taking any blood thinners (i.e. plavix, coumadin, jantoven, warfarin, heparin, pradaxa or dabigatran, etc)? No   If hold needed, do NOT schedule, route to RN pool     Is patient taking  any aspirin products (includes Excedrin and Fiorinal)? No     If more than 325mg/day, OK to schedule; Instruct pt to decrease to less than 325 mg for 7 days AND route to RN pool    For CERVICAL procedures, hold all aspirin products for 6 days.     Tell pt that if aspirin product is not held for 6 days, the procedure WILL BE cancelled.      Does the patient have a bleeding or clotting disorder? No     If YES, okay to schedule AND route to RN nurse pool    For any patients with platelet count <100, must be forwarded to provider    Is patient diabetic?  No  If YES, instruct them to bring their glucometer.    Does patient have an active infection or treated for one within the past week? No     Is patient currently taking any antibiotics?  No     For patients on chronic, preventative, or prophylactic antibiotics, procedures may be scheduled.     For patients on antibiotics for active or recent infection:antibiotic course must have been completed for 4 days    Is patient currently taking any steroid medications? (i.e. Prednisone, Medrol)  No     For patients on steroid medications, course must have been completed for 4 days    Is patient actively being treated for cancer or immunocompromised? No  If YES, do NOT schedule and route to RN pool     Are you able to get on and off an exam table with minimal or no assistance? Yes  If NO, do NOT schedule and route to RN pool    Are you able to roll over and lay on your stomach with minimal or no assistance? Yes  If NO, do NOT schedule and route to RN pool     Any allergies to contrast dye, iodine, shellfish, or numbing and steroid medications? No  If YES, route to RN pool AND add allergy information to appointment notes    Allergies: Patient has no known allergies.      Has the patient had a flu shot or any other vaccinations within 7 days before or after the procedure.  No     Have you recently had a COVID vaccine or have plans to get it in the near future?   If yes, explain  that for the vaccine to work best they need to:       wait 1 week before and 1 week after getting Vaccine #1    wait 1 week before and 2 weeks after getting Vaccine #2    If patient has concerns about the timing, send to RN pool     Does patient have an MRI/CT?  YES: 2021  Check Procedure Scheduling Grid to see if required.      Was the MRI done within the last 3 years?  Yes    If yes, where was the MRI done i.e.[facility], [facility], [facility], [facility] etc? [facility]      If no, do not schedule and route to RN pool    If MRI was not done at [facility], [facility] or [facility] do NOT schedule and route to RN pool.      If pt has an imaging disc, the injection MAY be scheduled but pt has to bring disc to appt.     If they show up without the disc the injection cannot be done    Procedure Specific Instructions:      If celiac plexus block, informed patient NPO for 6 hours and that it is okay to take medications with sips of water, especially blood pressure medications  Not Applicable         If this is for a cervical procedure, informed patient that aspirin needs to be held for 6 days.   Not Applicable      If IV needed:    Do not schedule procedures requiring IV placement in the first appointment of the day or first appointment after lunch. Do NOT schedule at 0745, 0815 or 1245.     Instructed pt to arrive 30 minutes early for IV start if required. (Check Procedure Scheduling Grid)  Not Applicable    Reminders:      If you are started on any steroids or antibiotics between now and your appointment, you must contact us because the procedure may need to be cancelled.  Yes      For all procedures except radiofrequency ablations (RFAs) and spinal cord stimulator (SCS) trials, informed patient:    IV sedation is not provided for this procedure.  If you feel that an oral anti-anxiety medication is needed, you can discuss this further with your referring provider or primary care provider.  The Pain Clinic provider will  discuss specifics of what the procedure includes at your appointment.  Most procedures last 10-20 minutes.  We use numbing medications to help with any discomfort during the procedure.  Not Applicable      For patients 85 or older we recommend having an adult stay w/ them for the remainder of the day.       Does the patient have any questions?  NO  Alexa Morgan  [facility]

## 2021-03-17 PROBLEM — M51.27 HERNIATION OF INTERVERTEBRAL DISC BETWEEN L5 AND S1: Status: ACTIVE | Noted: 2021-03-17

## 2021-03-17 PROBLEM — M54.17 LUMBOSACRAL RADICULOPATHY AT S1: Status: ACTIVE | Noted: 2021-03-17

## 2021-03-17 NOTE — PROGRESS NOTES
Dalton is a 40 year old who is being evaluated via a billable video visit.      How would you like to obtain your AVS? MyChart  If the video visit is dropped, the invitation should be resent by: Text to cell phone: [phone]  Will anyone else be joining your video visit? No      Video Start Time: 8: 21    Assessment & Plan     Herniation of intervertebral disc between L5 and S1  - HYDROcodone-acetaminophen (NORCO) 5-325 MG tablet; Take 1 tablet by mouth every 4 hours as needed for pain    Lumbosacral radiculopathy at S1  - predniSONE (DELTASONE) 20 MG tablet; Take 2 tablets (40 mg) by mouth daily    Unsteady gait    Unfortunately, his symptoms continue to get worse.  He was supposed to get the epidural steroid injection, but there has been a delay in his insurance providing approval for this.  In the meantime, his pain symptoms and weakness symptoms have been increasing.  He is no longer able to sit or stand for greater than 30 minutes at a time.  His gait has become more unsteady and he is needing his wife to help assist him when ambulating.  He may continue ibuprofen and I provided a prescription for Vicodin that he may use for breakthrough pain.  He was also given a prescription for prednisone that he is planning to start if he cannot get the steroid injection later today or tomorrow.  He is scheduled to follow-up with neurosurgery next Tuesday.  If the epidural steroid injection does not provide relief of symptoms the plan is to proceed with back surgery.  I went through the disability paperwork with him.  This will be scanned into his chart.  His work as a  is quite labor-intensive and requires a lot of heavy lifting and frequent bending, stooping and use of ladders.  He is not able to currently work due to the severity of his symptoms as described above.  I anticipate he will need a period of work restrictions in the coming weeks/months as he is recovering. These have been outlined in the  disability paperwork.      \plain  60 minutes spent on the date of the encounter doing chart review, review of outside records, review of test results, patient visit and documentation              No follow-ups on file.    Kp Duenas DO  [facility]    Keisha Hoffman is a 40 year old who presents for the following health issues     HPI     Patient would like to have FMLA forms completed.   Disability forms for herniated disc    He has a history of pain symptoms in his left buttock region which have been radiating down the left lower extremity (thigh and into the mid calf) since 2/26/2021. On 3/10/21 we checked an xray of the lumbar spine and sacrum which showed mild-moderate disc height loss at L5-S1, no sacroiliitis, no periarticular sclerosis or erosion.  On 3/10/2021 he had an MRI which showed left disc herniation at L5-S1 displacing the left S1 nerve root.  He was prescribed a 5-day course of prednisone which provided some relief of symptoms.  He was seen by neurosurgery and they recommended pursuing an epidural steroid injection.  Unfortunately, his insurance has not approved this procedure yet. He feels like symptoms are getting worse.  He describes having increased pain in the left back/buttock region and increased weakness in the left leg.  He is needing his wife to help him ambulate.  He describes falling yesterday due to these symptoms.   He reports not being able to sit or stand for more than 30 minutes at a time due to the symptoms.  He also has significant pain if trying to bend over or use stairs.  He is also taking NSAIDs without significant relief of symptoms.  He denies any history of fractures or surgery that involve the back or buttock region.  He denies any specific injury that may have caused this.            Review of Systems   Constitutional, HEENT, cardiovascular, pulmonary, gi and gu systems are negative, except as otherwise noted.      Objective            Vitals:  No vitals were obtained today due to virtual visit.    Physical Exam   GENERAL: Healthy, alert and no distress  EYES: Eyes grossly normal to inspection.  No discharge or erythema, or obvious scleral/conjunctival abnormalities.  RESP: No audible wheeze, cough, or visible cyanosis.  No visible retractions or increased work of breathing.    SKIN: Visible skin clear. No significant rash, abnormal pigmentation or lesions.  NEURO: Cranial nerves grossly intact.  Mentation and speech appropriate for age.  PSYCH: Mentation appears normal, affect normal/bright, judgement and insight intact, normal speech and appearance well-groomed.                Video-Visit Details    Type of service:  Video Visit    Video End Time:9: 02    Originating Location (pt. Location): Home    Distant Location (provider location):  [facility]     Platform used for Video Visit: [employer]

## 2021-03-17 NOTE — TELEPHONE ENCOUNTER
PA pending additional information - please specify exact level, laterality and approach of injection.          Shonda NINO    [facility]

## 2021-03-18 ENCOUNTER — VIRTUAL VISIT (OUTPATIENT)
Dept: FAMILY MEDICINE | Facility: CLINIC | Age: 41
End: 2021-03-18
Payer: COMMERCIAL

## 2021-03-18 DIAGNOSIS — M54.17 LUMBOSACRAL RADICULOPATHY AT S1: ICD-10-CM

## 2021-03-18 DIAGNOSIS — R26.81 UNSTEADY GAIT: ICD-10-CM

## 2021-03-18 DIAGNOSIS — M51.27 HERNIATION OF INTERVERTEBRAL DISC BETWEEN L5 AND S1: Primary | ICD-10-CM

## 2021-03-18 PROCEDURE — 99215 OFFICE O/P EST HI 40 MIN: CPT | Mod: 95 | Performed by: FAMILY MEDICINE

## 2021-03-18 RX ORDER — HYDROCODONE BITARTRATE AND ACETAMINOPHEN 5; 325 MG/1; MG/1
1 TABLET ORAL EVERY 4 HOURS PRN
Qty: 18 TABLET | Refills: 0 | Status: SHIPPED | OUTPATIENT
Start: 2021-03-18 | End: 2021-03-26

## 2021-03-18 RX ORDER — PREDNISONE 20 MG/1
40 TABLET ORAL DAILY
Qty: 10 TABLET | Refills: 0 | Status: SHIPPED | OUTPATIENT
Start: 2021-03-18 | End: 2021-04-07

## 2021-03-18 NOTE — TELEPHONE ENCOUNTER
Forms and records on Chasity's (TC's) Desk filled out and signed with Records attached  VV Appt today. Faxed and sent forms for scanning  Sammi [facility] Coordinator

## 2021-03-18 NOTE — NURSING NOTE
"Chief Complaint   Patient presents with     Forms     herniated disc     initial There were no vitals taken for this visit. Estimated body mass index is 27.52 kg/m  as calculated from the following:    Height as of 3/15/21: 1.727 m (5' 8\").    Weight as of 3/15/21: 82.1 kg (181 lb).  BP completed using cuff size: .  L  R arm      Health Maintenance that is potentially due pending provider review:      Rigoberto Garcia ma  "

## 2021-03-18 NOTE — TELEPHONE ENCOUNTER
PA and clinicals submitted via web portal for Left S1 TFESI to [employer].              Shonda NINO    [place] [facility]

## 2021-03-19 ENCOUNTER — TELEPHONE (OUTPATIENT)
Dept: NEUROSURGERY | Facility: CLINIC | Age: 41
End: 2021-03-19

## 2021-03-19 NOTE — TELEPHONE ENCOUNTER
Called patient and informed him that his insurance uses a third party for piror authorizations. I let him know that everything has been submitted to [facility] and once I hear back form them we will let him know.         Patient understood    Shonda NINO    [place] [facility]

## 2021-03-19 NOTE — TELEPHONE ENCOUNTER
Alexa, RN calling from Neurosurgery wanting to change pt's referral to Urgent. She stated they just want to get the pt in ASAP and if they need to place a new order stating Urgent to call and speak with any nurse at [phone].    Alexa SANCHES    [facility]

## 2021-03-19 NOTE — TELEPHONE ENCOUNTER
Pt scheduled 3/24, pt was prescribed steroids but is choosing to discontinue prescription so he can have the injection ASAP.    Alexa SANCHES    [facility]

## 2021-03-19 NOTE — TELEPHONE ENCOUNTER
Pt calling to state he called insurance this morning and they state they have not received a pre-certification request.    Alexa SANCHES    [facility]

## 2021-03-19 NOTE — TELEPHONE ENCOUNTER
Called [facility] and spoke to Gabriella went over clinicals with her and informed her that patient is in a lot of pain and this is an urgent case. Patient needs to get scheduled ASAP.    PA approved.  Effective date: 3/18/2021-3/31/21  PA reference #: [id number]  Pt. notified:   Okay to schedule with Dr Alex NINO    [facility]

## 2021-03-24 ENCOUNTER — RADIOLOGY INJECTION OFFICE VISIT (OUTPATIENT)
Dept: PALLIATIVE MEDICINE | Facility: CLINIC | Age: 41
End: 2021-03-24
Payer: COMMERCIAL

## 2021-03-24 VITALS — SYSTOLIC BLOOD PRESSURE: 163 MMHG | DIASTOLIC BLOOD PRESSURE: 105 MMHG | HEART RATE: 63 BPM | OXYGEN SATURATION: 98 %

## 2021-03-24 DIAGNOSIS — M54.16 LUMBAR RADICULOPATHY: Primary | ICD-10-CM

## 2021-03-24 PROCEDURE — 64483 NJX AA&/STRD TFRM EPI L/S 1: CPT | Mod: LT | Performed by: PHYSICAL MEDICINE & REHABILITATION

## 2021-03-24 NOTE — NURSING NOTE
Discharge Information    IV Discontiued Time:  NA    Amount of Fluid Infused:  NA    Discharge Criteria = When patient returns to baseline or as per MD order    Consciousness:  Pt is fully awake    Circulation:  BP +/- 20% of pre-procedure level    Respiration:  Patient is able to breathe deeply    O2 Sat:  Patient is able to maintain O2 Sat >92% on room air    Activity:  Moves 4 extremities on command    Ambulation:  Patient is able to stand and walk or stand and pivot into wheelchair    Dressing:  Clean/dry or No Dressing    Notes:   Discharge instructions and AVS given to patient    Patient meets criteria for discharge?  YES    Admitted to PCU?  No    Responsible adult present to accompany patient home?  Yes    Signature/Title:    Iliana Leigh RN  RN Care Coordinator  [facility] [place]

## 2021-03-24 NOTE — PROGRESS NOTES
[facility] - Procedure Note    Date of Service: 3/24/2021    Procedure performed: Left S1 transforaminal epidural steroid injection with fluoroscopic guidance  Diagnosis: Lumbar spondylosis; Lumbar radiculitis/radiculopathy  : Johnny Johnson DO & Sola Ferguson MD (Fellow)   Anesthesia: none    Indications: Dalton Tillman is a 40 year old male who is seen at the request of Keon Avila PA-C  for lumbar transforaminal epidural steroid injection. The patient describes pain in the low back that radiates into the left leg posteriorly to the bottom of the calf. He has been having more frequent leg buckling and weakness in the left leg as well, he did contact the neurosurgery clinic regarding these symptoms and was told to proceed with the lumbar epidural steroid injection. On exam today strength is 5/5 and symmetric with ankle dorsiflexion and knee extension. The patient has been exhibiting symptoms consistent with lumbar intraspinal inflammation and radiculopathy. Symptoms have been persistent, disabling, and intermittently severe. The patient reports minimal improvement with conservative treatment, including oral medications and exercises.    Lumbar MRI was done on 3/10/21 which showed   L5-S1:  Mild to moderate disc height loss. Left central disc extrusion  which contacts and displaces the traversing left S1 nerve root within  the lateral recess. Mild bilateral facet arthropathy. Mild right  foraminal stenosis. No left foraminal stenosis. Mild spinal canal  stenosis.       Allergies:    No Known Allergies     Vitals:  BP (!) 163/105   Pulse 63   SpO2 98%     Review of Systems: The patient denies recent fever, chills, illness, use of antibiotics or anticoagulants. All other 10-point review of systems negative.     Procedure: The procedure and risks were explained, and informed written consent was obtained from the patient. Risks include but are not limited to: infection,  bleeding, increased pain, and damage to soft tissue, nerve, muscle, and vasculature structures. After getting informed consent, patient was brought into the procedure suite and was placed in a prone position on the procedure table. A Pause for the Cause was performed. Patient was prepped and draped in sterile fashion.     After identifying the left S1 neuroforamen, a total of 4.5 mL of Lidocaine 1% was used to anesthetize the skin and the needle track at a skin entry site coaxial with the fluoroscopy beam, and overriding the superior aspect of the neuroforamen.  A 22 gauge 5 inch spinal needle was advanced under intermittent fluoroscopy until it entered the foramen superiorly.    The position was then inspected from anteroposterior and lateral views, and the needle adjusted appropriately.  After negative aspiration, a total of 2.5 mL of Omnipaque-300 was injected using static and continuous fluoroscopy confirming appropriate position, with spread along the nerve root sheath and into the epidural space, with no intravascular or intrathecal uptake. 7.5 mL of Omnipaque-300 was wasted.    1mL of 1% lidocaine with 10 mg of dexamethasone was injected.  The needle was removed. Hemostasis was achieved, the area was cleaned, and bandaids were placed when appropriate. Images were saved to PACS.    The patient tolerated the procedure well, and was taken to the recovery room, and there was no evidence of procedural complications. No new sensory or motor deficits were noted following the procedure. The patient was stable and able to ambulate on discharge home. Post-procedure instructions were provided.     Pre-procedure pain score: 1/10 in the back, 7/10 in the leg  Post-procedure pain score: 1/10 in the back, 2/10 in the leg    Assessment/Plan: Dalton Tillman is a 40 year old male s/p left S1 transforaminal epidural steroid injection today for lumbar spondylosis, radiculitis/radiculopathy.     1. Following today's procedure,  the patient was advised to contact the [facility] for any of the following:   Fever, chills, or night sweats   New onset of pain, numbness, or weakness   Any questions/concerns regarding the procedure  If unable to contact the Pain Center, the patient was instructed to go to a local Emergency Room for any complications.   2. The patient will receive a follow-up call in 1 week.  3. The patient should follow-up with the referring provider in 2 weeks for post-procedure evaluation.        Johnny Johnson DO  [facility] [place]

## 2021-03-24 NOTE — PATIENT INSTRUCTIONS
[facility]   Procedure Discharge Instructions    Today you saw:     Dr. Johnny Johnson       You had an:  Epidural steroid injection      Medications used:  Lidocaine  Dexamethasone Omnipaque            Be cautious when walking. Numbness and/or weakness in the lower extremities may occur for up to 6-8 hours after the procedure due to effect of the local anesthetic    Do not drive for 6 hours. The effect of the local anesthetic could slow your reflexes.     You may resume your regular activities after 24 hours    Avoid strenuous activity for the first 24 hours    You may shower, however avoid swimming, tub baths or hot tubs for 24 hours following your procedure    You may have a mild to moderate increase in pain for several days following the injection.    It may take up to 14 days for the steroid medication to start working although you may feel the effect as early as a few days after the procedure.       You may use ice packs for 10-15 minutes, 3 to 4 times a day at the injection site for comfort    Do not use heat to painful areas for 6 to 8 hours. This will give the local anesthetic time to wear off and prevent you from accidentally burning your skin.     Unless you have been directed to avoid the use of anti-inflammatory medications (NSAIDS), you may use medications such as ibuprofen, Aleve or Tylenol for pain control if needed.     If you were fasting, you may resume your normal diet and medications after the procedure    Possible side effects of steroids that you may experience include flushing, elevated blood pressure, increased appetite, mild headaches and restlessness.  All of these symptoms will get better with time.    If you experience any of the following, call the Pain Clinic during work hours (Mon-Friday 8-4:30 pm) at [phone] or the Provider Line after hours at [phone]:  -Fever over 100 degree F  -Swelling, bleeding, redness, drainage, warmth at the injection  site  -Progressive weakness or numbness in your legs or arms  -Loss of bowel or bladder function  -Unusual headache that is not relieved by Tylenol or other pain reliever  -Unusual new onset of pain that is not improving

## 2021-03-24 NOTE — Clinical Note
FYI: Patient continues to be in severe discomfort and is having more frequent episodes of leg weakness. Normal strength exam today.     Left S1 juan was completed.    Thanks,    DO [facility]

## 2021-03-26 ENCOUNTER — MYC REFILL (OUTPATIENT)
Dept: FAMILY MEDICINE | Facility: CLINIC | Age: 41
End: 2021-03-26

## 2021-03-26 DIAGNOSIS — M51.27 HERNIATION OF INTERVERTEBRAL DISC BETWEEN L5 AND S1: ICD-10-CM

## 2021-03-26 NOTE — TELEPHONE ENCOUNTER
DE,    Please see [employer] message below.  Last Rx 3/18/21 for #18    Thanks,  Chasity Paige RN

## 2021-03-29 RX ORDER — HYDROCODONE BITARTRATE AND ACETAMINOPHEN 5; 325 MG/1; MG/1
1 TABLET ORAL EVERY 4 HOURS PRN
Qty: 18 TABLET | Refills: 0 | Status: SHIPPED | OUTPATIENT
Start: 2021-03-29 | End: 2021-04-07

## 2021-04-05 ENCOUNTER — OFFICE VISIT (OUTPATIENT)
Dept: NEUROSURGERY | Facility: CLINIC | Age: 41
End: 2021-04-05
Attending: NEUROLOGICAL SURGERY
Payer: COMMERCIAL

## 2021-04-05 VITALS
HEIGHT: 68 IN | DIASTOLIC BLOOD PRESSURE: 76 MMHG | BODY MASS INDEX: 27.43 KG/M2 | WEIGHT: 181 LBS | OXYGEN SATURATION: 96 % | SYSTOLIC BLOOD PRESSURE: 110 MMHG | HEART RATE: 86 BPM

## 2021-04-05 DIAGNOSIS — Z01.818 PRE-OP TESTING: Primary | ICD-10-CM

## 2021-04-05 DIAGNOSIS — M51.26 LUMBAR DISC HERNIATION: ICD-10-CM

## 2021-04-05 PROCEDURE — G0463 HOSPITAL OUTPT CLINIC VISIT: HCPCS

## 2021-04-05 PROCEDURE — 99214 OFFICE O/P EST MOD 30 MIN: CPT | Performed by: NEUROLOGICAL SURGERY

## 2021-04-05 ASSESSMENT — PAIN SCALES - GENERAL: PAINLEVEL: SEVERE PAIN (7)

## 2021-04-05 ASSESSMENT — MIFFLIN-ST. JEOR: SCORE: 1710.51

## 2021-04-05 NOTE — LETTER
4/5/2021         RE: Dalton Tillman  [address] [place] [facility] [place] [address]        Dear Colleague,    Thank you for referring your patient, Dalton Tillman, to the [facility] [place]. Please see a copy of my visit note below.    Mr. Tillman is a 40-year-old man seen today for evaluation of intractable left sciatica.  For full details of initial presentation, please refer to Mr. Avila's initial note dictated several weeks ago.  His pain began on February 26, 2021 without known antecedent event.  He notes that his symptoms have increased significantly since that time and are associated with difficulty with ambulation, difficulty with sleep and immediate exacerbation with bending lifting twisting, Valsalva maneuver, etc.  Mr. 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  Presently, he has no complaint of right lower extremity pain, and denies changes in bowel or bladder control and has no history of prior invasive procedure to his lumbar spine or similar symptoms in the past.  Is recently undergone lumbar epidural steroid injection with very transient symptomatic improvement during the anesthetic phase but no effect with the steroid phase and no overall improvement in his situation.    On examination, he is muscular, healthy and in significant pain while seated, changing position or walking.  Motor examination shows 4/5 strength with plantar flexion at the left ankle while weightbearing.  Strength is otherwise 5/5 bilaterally in his lower extremities..  Are paresthesias and sensory disturbance in the left S1 dermatomal pattern.  Deep tendon review lenses are 1+ and equal at the knees and right ankle, absent at the left ankle.  Straight leg raising sign is floridly positive on the left side with a positive cross straight leg raising sign also floridly positive.  Gait is  markedly antalgic.    Review of his recent lumbar MRI scan obtained on March 10, 2021 shows degenerative disc changes essentially isolated to the L5-S1 level where he has a central and left-sided disc protrusion and significant impingement of the transiting left S1 nerve.  There is no other evidence of significant lumbar pathology nor any other source of neural impingement.  Spinal canal is otherwise widely patent and there is no difficulty with spinal alignment.    Assessment: Intractable left sciatic pain with focal neurologic deficit in this otherwise healthy and active 40-year-old man who is failed a 6-week program of conservative management.  I have reviewed the clinical and radiographic findings in detail with the patient and his wife who was also present throughout the course the interview and examination.  Given the circumstances and to avoid permanent neurologic deficit, I have strongly recommended proceeding with a left L5-S1 hemilaminectomy and microdiscectomy using minimally invasive surgical technique on an outpatient basis.  I spoke with the patient and his wife at length regarding the details of the procedure.  I told him that the risks of the procedure include failure to improve his symptoms, increased pain weakness or numbness, injury to the nerve root or dural covering, infection, bleeding, recurrent or residual disc herniation, etc.  He appeared to have a good understanding at the conclusion of our discussion, asked appropriate questions which I answered to his apparent satisfaction and wished to proceed with surgery as soon as practical.  He also watched the lumbar disc herniation video twice reportedly prior to this visit.    Approximately 35 minutes was spent direct contact with the patient and his wife the majority reviewing the clinical and radiographic findings, management alternatives, risks and benefits.          Again, thank you for allowing me to participate in the care of your patient.         Sincerely,        To Baker MD

## 2021-04-05 NOTE — NURSING NOTE
"April 5, 2021 1:18 PM   Dalton Tillman is a 40 year old male who presents for:    Chief Complaint   Patient presents with     Leg Pain     leg pain since feb 26th 2021, discuss surgical options, f/u from inj (3/24/21).     Initial Vitals: /76   Pulse 86   Ht 1.727 m (5' 8\")   Wt 82.1 kg (181 lb)   SpO2 96%   BMI 27.52 kg/m   Estimated body mass index is 27.52 kg/m  as calculated from the following:    Height as of this encounter: 1.727 m (5' 8\").    Weight as of this encounter: 82.1 kg (181 lb). Body surface area is 1.98 meters squared.  Severe Pain (7) Comment: Data Unavailable       Clinical concerns: Dalton Tillman is here today for dx. Lumbar radiculopathy, f/u from inj. 3/24/21.    Tae Zacarias MA  "

## 2021-04-05 NOTE — PATIENT INSTRUCTIONS
Patient Instructions    Surgery scheduled at [facility] for Hemilaminectomy and Microdiscectomy with Dr. Baker  Pre-Operative    Surgical risks: blood clots in the leg or lung, problems urinating, nerve damage, drainage from the incision, infection,stiffness    Pre-operative physical with primary care physician within 30 days of surgical date.     Likely same day procedure with discharge home day of surgery, may stay for 23 hour observation hospitalization for monitoring.       Shower procedure  o Please shower with antimicrobial soap the night before surgery and morning of surgery. Please refer to showering instruction sheet in folder.    Eating/Drinking  o Stop all solid foods 8 hours before surgery.  o Keep drinking clear liquids until 4 hours before surgery  - Clear liquids include water, clear juice, black coffee, or clear tea without milk, Gatorade, clear soda.     Medications  o Discontinue Aspirin, NSAIDs (Advil/Ibuprofen, Indocin, Naproxen,Nuprin,Relafen/Nabumetone, Diclofenac,Meloxicam, Aleve, Celebrex) x 7 days prior to surgical date  o You can take Tylenol (Acetaminophen) for pain, 1000 mg  - Do not exceed 3,000 mg per day   o Any other medications prescribed, please discuss with your primary care provider at your pre-operative physical     As part of preparation for your upcoming procedure you are required to have a test for the novel Coronavirus, COVID-19  o Please call the drive-up testing number to schedule your appointment. The test needs to be completed within 4 days (96) hours of surgery.   o Scheduling Number: [phone]     Pain Management    Dealing with pain  o As your body heals, you might feel a stabbing, burning, or aching pain. You may also have some numbness.  o Everyone feels pain differently, we may ask you to rate your pain using a pain scale. This will let us know how much pain you feel.   o Keep in mind that medicine won't take away all of your pain. It helps to try  other ways to relax and ease pain.     Things to help with pain  o After surgery, we will give you medicine for your pain. These medications work well, but they can make you drowsy, itchy, or sick to your stomach. If we give you narcotics for pain, try to take the pills with food.   o For mild to moderate pain, you can take medication such as Tylenol. These can be used with narcotics, but make sure that your narcotic does not contain Tylenol.   - Do NOT drive while taking narcotic pain medication  - Do NOT drink alcohol while using any pain medication  o You can utilize ice as needed (no longer than 20 minutes at one time)    You may resume taking NSAIDs (ex. Ibuprofen, aleve, naproxen) 14 days post op     Incision Care    No submerging incision in water such as pools, hot tubs, baths for at least 8 weeks or until incision is healed    It is okay to shower, just pat the incision dry     Remove dressing as instructed upon discharge    Watch for signs of infection  o Redness, swelling, warmth, drainage, and fever of 101 degrees or higher  o Notify clinic [phone].  Activity Restrictions    For the first 6-8 weeks, no lifting > 10 pounds, limited bending, twisting, or overhead reaching.    Take stairs in moderation     Ok to walk as tolerated, take short frequent walks. You may gradually increase the distance as tolerated.     Avoid bed rest and prolonged sitting for longer than 30 minutes (change positions frequently while awake)    No contact sports until after follow up visit    No high impact activities such as; running/jogging, snowmobile or 4 millan riding or any other recreational vehicles.  Post-Op Follow Up Appointments    2 week incision check with RN    6 week post op follow up visit with Physician Assistant or Nurse Practitioner     Please call to schedule follow up appointment at [phone]  Resources    If you are currently employed, you will need to be off work for 2-4 weeks for post op recovery  and healing.  Please fax any FMLA/short term disability paperwork to [phone]. You may call our clinic when you'd like to return to work and we can provide a work letter.

## 2021-04-05 NOTE — PROGRESS NOTES
Mr. Tillman is a 40-year-old man seen today for evaluation of intractable left sciatica.  For full details of initial presentation, please refer to Mr. Avila's initial note dictated several weeks ago.  His pain began on February 26, 2021 without known antecedent event.  He notes that his symptoms have increased significantly since that time and are associated with difficulty with ambulation, difficulty with sleep and immediate exacerbation with bending lifting twisting, Valsalva maneuver, etc.  Mr. 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  Presently, he has no complaint of right lower extremity pain, and denies changes in bowel or bladder control and has no history of prior invasive procedure to his lumbar spine or similar symptoms in the past.  Is recently undergone lumbar epidural steroid injection with very transient symptomatic improvement during the anesthetic phase but no effect with the steroid phase and no overall improvement in his situation.    On examination, he is muscular, healthy and in significant pain while seated, changing position or walking.  Motor examination shows 4/5 strength with plantar flexion at the left ankle while weightbearing.  Strength is otherwise 5/5 bilaterally in his lower extremities..  Are paresthesias and sensory disturbance in the left S1 dermatomal pattern.  Deep tendon review lenses are 1+ and equal at the knees and right ankle, absent at the left ankle.  Straight leg raising sign is floridly positive on the left side with a positive cross straight leg raising sign also floridly positive.  Gait is markedly antalgic.    Review of his recent lumbar MRI scan obtained on March 10, 2021 shows degenerative disc changes essentially isolated to the L5-S1 level where he has a central and left-sided disc protrusion and significant impingement of the transiting  left S1 nerve.  There is no other evidence of significant lumbar pathology nor any other source of neural impingement.  Spinal canal is otherwise widely patent and there is no difficulty with spinal alignment.    Assessment: Intractable left sciatic pain with focal neurologic deficit in this otherwise healthy and active 40-year-old man who is failed a 6-week program of conservative management.  I have reviewed the clinical and radiographic findings in detail with the patient and his wife who was also present throughout the course the interview and examination.  Given the circumstances and to avoid permanent neurologic deficit, I have strongly recommended proceeding with a left L5-S1 hemilaminectomy and microdiscectomy using minimally invasive surgical technique on an outpatient basis.  I spoke with the patient and his wife at length regarding the details of the procedure.  I told him that the risks of the procedure include failure to improve his symptoms, increased pain weakness or numbness, injury to the nerve root or dural covering, infection, bleeding, recurrent or residual disc herniation, etc.  He appeared to have a good understanding at the conclusion of our discussion, asked appropriate questions which I answered to his apparent satisfaction and wished to proceed with surgery as soon as practical.  He also watched the lumbar disc herniation video twice reportedly prior to this visit.    Approximately 35 minutes was spent direct contact with the patient and his wife the majority reviewing the clinical and radiographic findings, management alternatives, risks and benefits.

## 2021-04-05 NOTE — NURSING NOTE
Reviewed pre- and post-operative instructions as outlined in the After Visit Summary/Patient Instructions with patient.   Surgery folder was given to patient    Patient Education Topic: Procedure with Dr. Baker     Learner(s): Patient    Knowledge Level: Basic    Readiness to Learn: Ready    Method:  Verbal explanation    Outcome: Able to verbalize instructions    Barriers to Learning: No barrier    Covid Testing: patient educated they will need to have a test for the novel Coronavirus, COVID-19.The test needs to be completed within 4 days (96) hours of surgery. Order Placed.    Scheduling Number: [phone]    Patient had the opportunity for questions to be answered. Advised Patient to call clinic with any questions/concerns. Gave patient antibacterial soap for pre-surgery skin preparation.        Lasix 40mg po daily  BMP , Mag

## 2021-04-07 ENCOUNTER — OFFICE VISIT (OUTPATIENT)
Dept: FAMILY MEDICINE | Facility: CLINIC | Age: 41
End: 2021-04-07
Payer: COMMERCIAL

## 2021-04-07 VITALS
TEMPERATURE: 98.8 F | DIASTOLIC BLOOD PRESSURE: 100 MMHG | BODY MASS INDEX: 26.83 KG/M2 | HEIGHT: 68 IN | HEART RATE: 94 BPM | RESPIRATION RATE: 16 BRPM | OXYGEN SATURATION: 99 % | SYSTOLIC BLOOD PRESSURE: 144 MMHG | WEIGHT: 177 LBS

## 2021-04-07 DIAGNOSIS — Z01.818 PREOP GENERAL PHYSICAL EXAM: Primary | ICD-10-CM

## 2021-04-07 DIAGNOSIS — M54.17 LUMBOSACRAL RADICULOPATHY AT S1: ICD-10-CM

## 2021-04-07 DIAGNOSIS — I10 ESSENTIAL HYPERTENSION, BENIGN: ICD-10-CM

## 2021-04-07 DIAGNOSIS — M51.27 HERNIATION OF INTERVERTEBRAL DISC BETWEEN L5 AND S1: ICD-10-CM

## 2021-04-07 LAB
ANION GAP SERPL CALCULATED.3IONS-SCNC: 11 MMOL/L (ref 3–14)
BUN SERPL-MCNC: 14 MG/DL (ref 7–30)
CALCIUM SERPL-MCNC: 9.9 MG/DL (ref 8.5–10.1)
CHLORIDE SERPL-SCNC: 106 MMOL/L (ref 94–109)
CO2 SERPL-SCNC: 22 MMOL/L (ref 20–32)
CREAT SERPL-MCNC: 0.88 MG/DL (ref 0.66–1.25)
GFR SERPL CREATININE-BSD FRML MDRD: >90 ML/MIN/{1.73_M2}
GLUCOSE SERPL-MCNC: 91 MG/DL (ref 70–99)
HGB BLD-MCNC: 16.7 G/DL (ref 13.3–17.7)
POTASSIUM SERPL-SCNC: 3.6 MMOL/L (ref 3.4–5.3)
SODIUM SERPL-SCNC: 139 MMOL/L (ref 133–144)

## 2021-04-07 PROCEDURE — 85018 HEMOGLOBIN: CPT | Performed by: FAMILY MEDICINE

## 2021-04-07 PROCEDURE — 80048 BASIC METABOLIC PNL TOTAL CA: CPT | Performed by: FAMILY MEDICINE

## 2021-04-07 PROCEDURE — 99215 OFFICE O/P EST HI 40 MIN: CPT | Performed by: FAMILY MEDICINE

## 2021-04-07 PROCEDURE — 36415 COLL VENOUS BLD VENIPUNCTURE: CPT | Performed by: FAMILY MEDICINE

## 2021-04-07 RX ORDER — CEFAZOLIN SODIUM 2 G/100ML
2 INJECTION, SOLUTION INTRAVENOUS SEE ADMIN INSTRUCTIONS
Status: CANCELLED | OUTPATIENT
Start: 2021-04-07

## 2021-04-07 RX ORDER — CEFAZOLIN SODIUM 2 G/100ML
2 INJECTION, SOLUTION INTRAVENOUS
Status: CANCELLED | OUTPATIENT
Start: 2021-04-07

## 2021-04-07 RX ORDER — LISINOPRIL 20 MG/1
20 TABLET ORAL DAILY
Qty: 30 TABLET | Refills: 1 | Status: SHIPPED | OUTPATIENT
Start: 2021-04-07 | End: 2021-05-11

## 2021-04-07 RX ORDER — HYDROCODONE BITARTRATE AND ACETAMINOPHEN 5; 325 MG/1; MG/1
1 TABLET ORAL EVERY 4 HOURS PRN
Qty: 30 TABLET | Refills: 0 | Status: SHIPPED | OUTPATIENT
Start: 2021-04-07 | End: 2021-04-19

## 2021-04-07 ASSESSMENT — MIFFLIN-ST. JEOR: SCORE: 1692.37

## 2021-04-07 NOTE — PROGRESS NOTES
[facility]  [address]  [facility] [address]  Phone: [phone]  Primary Provider: Manjinder Rodriguez  Pre-op Performing Provider: SHANELL MORENO      PREOPERATIVE EVALUATION:  Today's date: 4/7/2021    Dalton Tillman is a 40 year old male who presents for a preoperative evaluation.    Surgical Information:  Surgery/Procedure: left L5-S1 hemilaminectomy and microdiscectomy  Surgery Location: [facility]   Surgeon: Dr. Baker  Surgery Date: not set up yet  Time of Surgery:   Where patient plans to recover: At home with family  Fax number for surgical facility: Note does not need to be faxed, will be available electronically in Epic.    Type of Anesthesia Anticipated: to be determined    Assessment & Plan     The proposed surgical procedure is considered INTERMEDIATE risk.    Preop general physical exam  He is cleared to proceed with back surgery as scheduled.  - Hemoglobin    Herniation of intervertebral disc between L5 and S1  He is cleared to proceed with back surgery as scheduled.  He was given a refill of Vicodin that he may use as needed for breakthrough pain.  He will hold ibuprofen for the week prior to surgery.   - HYDROcodone-acetaminophen (NORCO) 5-325 MG tablet; Take 1 tablet by mouth every 4 hours as needed for pain    Lumbosacral radiculopathy at S1    Essential hypertension, benign  His blood pressure is high today.  He has had several elevated readings in the recent past.  We discussed treatment options and decided to start lisinopril 20 mg daily.  He will continue to work on eating a healthy low-sodium diet and get regular aerobic exercise once the back pain symptoms have improved.  I asked him to schedule follow-up appointment in a few weeks for blood pressure check.  He is planning to start taking this in the evenings so he should be able to take it the night prior to surgery.  - lisinopril (ZESTRIL) 20 MG tablet;  Take 1 tablet (20 mg) by mouth daily  - Basic metabolic panel                 RECOMMENDATION:  APPROVAL GIVEN to proceed with proposed procedure, without further diagnostic evaluation.      40 minutes spent on the date of the encounter doing chart review, review of outside records, review of test results, patient visit and documentation       Subjective     HPI related to upcoming procedure:   He has a history of pain symptoms in his left buttock region which have been radiating down the left lower extremity (thigh and into the mid calf) since 2/26/2021. On 3/10/21 we checked an xray of the lumbar spine and sacrum which showed mild-moderate disc height loss at L5-S1, no sacroiliitis, no periarticular sclerosis or erosion.  On 3/10/2021 he had an MRI which showed left disc herniation at L5-S1 displacing the left S1 nerve root.  He was prescribed a 5-day course of prednisone which provided only short-term modest relief of symptoms.  He was seen by neurosurgery and completed 6 weeks of conservative therapy without relief of symptoms.  He was last seen by neurosurgery on 4/5/2021 and they recommended pursuing an L5-S1 hemilaminectomy and microdiscectomy for treatment.  He continues to struggle with significant pain and weakness from the low back into the left leg.  He is having difficulty walking because of this.  He has been taking Tylenol, ibuprofen and Vicodin as needed for breakthrough pain.  He also has a history of prehypertension.  He has had elevated blood pressure readings fairly recently at the dental office and an hour clinic over the past couple of months.  He denies headaches or blurry vision.  He is not having chest pains or shortness of breath symptoms.      Preop Questions 4/6/2021   1. Have you ever had a heart attack or stroke? No   2. Have you ever had surgery on your heart or blood vessels, such as a stent placement, a coronary artery bypass, or surgery on an artery in your head, neck, heart, or  legs? No   3. Do you have chest pain with activity? No   4. Do you have a history of  heart failure? No   5. Do you currently have a cold, bronchitis or symptoms of other infection? No   6. Do you have a cough, shortness of breath, or wheezing? No   7. Do you or anyone in your family have previous history of blood clots? No   8. Do you or does anyone in your family have a serious bleeding problem such as prolonged bleeding following surgeries or cuts? No   9. Have you ever had problems with anemia or been told to take iron pills? No   10. Have you had any abnormal blood loss such as black, tarry or bloody stools? No   11. Have you ever had a blood transfusion? No   12. Are you willing to have a blood transfusion if it is medically needed before, during, or after your surgery? Yes   13. Have you or any of your relatives ever had problems with anesthesia? No   14. Do you have sleep apnea, excessive snoring or daytime drowsiness? No   15. Do you have any artifical heart valves or other implanted medical devices like a pacemaker, defibrillator, or continuous glucose monitor? No   16. Do you have artificial joints? No   17. Are you allergic to latex? No           Review of Systems  CONSTITUTIONAL: NEGATIVE for fever, chills, change in weight  INTEGUMENTARY/SKIN: NEGATIVE for worrisome rashes, moles or lesions  EYES: NEGATIVE for vision changes or irritation  ENT/MOUTH: NEGATIVE for ear, mouth and throat problems  RESP: NEGATIVE for significant cough or SOB  BREAST: NEGATIVE for masses, tenderness or discharge  CV: NEGATIVE for chest pain, palpitations or peripheral edema  GI: NEGATIVE for nausea, abdominal pain, heartburn, or change in bowel habits  : NEGATIVE for frequency, dysuria, or hematuria  MUSCULOSKELETAL: NEGATIVE for significant arthralgias or myalgia  NEURO: NEGATIVE for weakness, dizziness or paresthesias  ENDOCRINE: NEGATIVE for temperature intolerance, skin/hair changes  HEME: NEGATIVE for bleeding  "problems  PSYCHIATRIC: NEGATIVE for changes in mood or affect    Patient Active Problem List    Diagnosis Date Noted     Lumbosacral radiculopathy at S1 2021     Priority: Medium     Herniation of intervertebral disc between L5 and S1 2021     Priority: Medium     CARDIOVASCULAR SCREENING; LDL GOAL LESS THAN 160 2012     Priority: Medium      No past medical history on file.  Past Surgical History:   Procedure Laterality Date     ORTHOPEDIC SURGERY  2016    left wrist     Current Outpatient Medications   Medication Sig Dispense Refill     HYDROcodone-acetaminophen (NORCO) 5-325 MG tablet Take 1 tablet by mouth every 4 hours as needed for pain 18 tablet 0     ibuprofen (ADVIL/MOTRIN) 200 MG capsule Take 200 mg by mouth every 4 hours as needed for fever         No Known Allergies     Social History     Tobacco Use     Smoking status: Former Smoker     Packs/day: 0.00     Years: 0.00     Pack years: 0.00     Quit date: 10/15/2016     Years since quittin.4     Smokeless tobacco: Never Used   Substance Use Topics     Alcohol use: Yes     Alcohol/week: 0.0 standard drinks     Comment: Socailly       History   Drug Use     Types: Marijuana     Comment: Marijuana         Objective     BP (!) 145/102 (BP Location: Left arm, Cuff Size: Adult Regular)   Pulse 94   Temp 98.8  F (37.1  C) (Tympanic)   Resp 16   Ht 1.727 m (5' 8\")   Wt 80.3 kg (177 lb)   SpO2 99%   BMI 26.91 kg/m      Physical Exam    GENERAL APPEARANCE: healthy, alert and no distress     EYES: EOMI,  PERRL     HENT:  nose and mouth without ulcers or lesions     NECK: no adenopathy, no asymmetry, masses, or scars and thyroid normal to palpation     RESP: lungs clear to auscultation - no rales, rhonchi or wheezes     CV: regular rates and rhythm, normal S1 S2, no S3 or S4 and no murmur, click or rub     ABDOMEN:  soft, nontender, no HSM or masses and bowel sounds normal     MS: Gait is antalgic and unsteady.  Straight leg raise " positive on the left.  Nontender over the thoracic and lumbar spinous processes.     SKIN: no suspicious lesions or rashes     NEURO: Normal strength and tone, sensory exam grossly normal, mentation intact and speech normal.  Deep tendon reflexes 24 bilaterally.     PSYCH: mentation appears normal. and affect normal/bright     LYMPHATICS: No cervical adenopathy    No results for input(s): HGB, PLT, INR, NA, POTASSIUM, CR, A1C in the last 53285 hours.     Diagnostics:  Recent Results (from the past 48 hour(s))   Hemoglobin    Collection Time: 04/07/21  1:26 PM   Result Value Ref Range    Hemoglobin 16.7 13.3 - 17.7 g/dL   Basic metabolic panel    Collection Time: 04/07/21  1:26 PM   Result Value Ref Range    Sodium 139 133 - 144 mmol/L    Potassium 3.6 3.4 - 5.3 mmol/L    Chloride 106 94 - 109 mmol/L    Carbon Dioxide 22 20 - 32 mmol/L    Anion Gap 11 3 - 14 mmol/L    Glucose 91 70 - 99 mg/dL    Urea Nitrogen 14 7 - 30 mg/dL    Creatinine 0.88 0.66 - 1.25 mg/dL    GFR Estimate >90 >60 mL/min/[1.73_m2]    GFR Estimate If Black >90 >60 mL/min/[1.73_m2]    Calcium 9.9 8.5 - 10.1 mg/dL      No EKG required, no history of coronary heart disease, significant arrhythmia, peripheral arterial disease or other structural heart disease.    Revised Cardiac Risk Index (RCRI):  The patient has the following serious cardiovascular risks for perioperative complications:   - No serious cardiac risks = 0 points     RCRI Interpretation: 0 points: Class I (very low risk - 0.4% complication rate)           Signed Electronically by: Kp Duenas DO  Copy of this evaluation report is provided to requesting physician.

## 2021-04-07 NOTE — NURSING NOTE
"Chief Complaint   Patient presents with     Pre-Op Exam     lumbar spine date not set      initial BP (!) 145/102 (BP Location: Left arm, Cuff Size: Adult Regular)   Pulse 94   Temp 98.8  F (37.1  C) (Tympanic)   Resp 16   Ht 1.727 m (5' 8\")   Wt 80.3 kg (177 lb)   SpO2 99%   BMI 26.91 kg/m   Estimated body mass index is 26.91 kg/m  as calculated from the following:    Height as of this encounter: 1.727 m (5' 8\").    Weight as of this encounter: 80.3 kg (177 lb).  BP completed using cuff size: regular.  L  arm      Health Maintenance that is potentially due pending provider review:  NONE    n/a    Rigoberto Garcia ma  "

## 2021-04-07 NOTE — PATIENT INSTRUCTIONS

## 2021-04-08 ENCOUNTER — HOSPITAL ENCOUNTER (OUTPATIENT)
Facility: CLINIC | Age: 41
End: 2021-04-08
Attending: NEUROLOGICAL SURGERY | Admitting: NEUROLOGICAL SURGERY
Payer: COMMERCIAL

## 2021-04-08 DIAGNOSIS — Z11.59 ENCOUNTER FOR SCREENING FOR OTHER VIRAL DISEASES: ICD-10-CM

## 2021-04-08 DIAGNOSIS — M51.26 LUMBAR DISC HERNIATION: ICD-10-CM

## 2021-04-19 DIAGNOSIS — Z11.59 ENCOUNTER FOR SCREENING FOR OTHER VIRAL DISEASES: ICD-10-CM

## 2021-04-19 PROCEDURE — 87635 SARS-COV-2 COVID-19 AMP PRB: CPT | Performed by: NEUROLOGICAL SURGERY

## 2021-04-21 ENCOUNTER — MYC REFILL (OUTPATIENT)
Dept: FAMILY MEDICINE | Facility: CLINIC | Age: 41
End: 2021-04-21

## 2021-04-21 DIAGNOSIS — M51.27 HERNIATION OF INTERVERTEBRAL DISC BETWEEN L5 AND S1: ICD-10-CM

## 2021-04-21 RX ORDER — HYDROCODONE BITARTRATE AND ACETAMINOPHEN 5; 325 MG/1; MG/1
1 TABLET ORAL EVERY 4 HOURS PRN
Qty: 20 TABLET | Refills: 0 | Status: CANCELLED | OUTPATIENT
Start: 2021-04-21

## 2021-04-22 ENCOUNTER — MYC MEDICAL ADVICE (OUTPATIENT)
Dept: FAMILY MEDICINE | Facility: CLINIC | Age: 41
End: 2021-04-22

## 2021-04-22 NOTE — TELEPHONE ENCOUNTER
DE,    Please see [employer] message below.  Patient saw you for preop 4/7/21.    Thanks,  Chasity Paige RN

## 2021-04-28 ENCOUNTER — VIRTUAL VISIT (OUTPATIENT)
Dept: FAMILY MEDICINE | Facility: CLINIC | Age: 41
End: 2021-04-28
Payer: COMMERCIAL

## 2021-04-28 VITALS
DIASTOLIC BLOOD PRESSURE: 82 MMHG | WEIGHT: 172 LBS | HEART RATE: 83 BPM | BODY MASS INDEX: 26.15 KG/M2 | SYSTOLIC BLOOD PRESSURE: 138 MMHG

## 2021-04-28 DIAGNOSIS — M54.17 LUMBOSACRAL RADICULOPATHY AT S1: ICD-10-CM

## 2021-04-28 DIAGNOSIS — M51.27 HERNIATION OF INTERVERTEBRAL DISC BETWEEN L5 AND S1: Primary | ICD-10-CM

## 2021-04-28 DIAGNOSIS — R26.81 UNSTEADY GAIT: ICD-10-CM

## 2021-04-28 DIAGNOSIS — I10 ESSENTIAL HYPERTENSION, BENIGN: ICD-10-CM

## 2021-04-28 PROCEDURE — 99215 OFFICE O/P EST HI 40 MIN: CPT | Mod: 95 | Performed by: FAMILY MEDICINE

## 2021-04-28 RX ORDER — HYDROCODONE BITARTRATE AND ACETAMINOPHEN 5; 325 MG/1; MG/1
1 TABLET ORAL EVERY 4 HOURS PRN
Qty: 20 TABLET | Refills: 0 | Status: SHIPPED | OUTPATIENT
Start: 2021-04-28 | End: 2021-05-11

## 2021-04-28 NOTE — TELEPHONE ENCOUNTER
Please reach out to this patient and ask him to schedule a video or telephone visit with me to go over the disability forms.  A 40-minute slot would be ideal since there are several pages to go through in which they ask for very specific details about symptoms and dates of various treatments.  I should have availability today or tomorrow so that we can get this done before 5/2.  Thank you, DE

## 2021-04-28 NOTE — TELEPHONE ENCOUNTER
Tesha Fisher  [place] Triage 2 minutes ago (1:10 PM)     Scheduled today 4/28/21    Message text      Chasity Paige RN

## 2021-04-28 NOTE — PROGRESS NOTES
Dalton is a 40 year old who is being evaluated via a billable video visit.      How would you like to obtain your AVS? Johnnie  If the video visit is dropped, the invitation should be resent by: Johnnie or else Text to cell phone: [phone]  Will anyone else be joining your video visit? No      Video Start Time: 4: 02    Assessment & Plan     Herniation of intervertebral disc between L5 and S1  - HYDROcodone-acetaminophen (NORCO) 5-325 MG tablet; Take 1 tablet by mouth every 4 hours as needed for pain    Lumbosacral radiculopathy at S1    Essential hypertension, benign    Unsteady gait    Unfortunately, his original neurosurgeon had to cancel the surgery and that providers not seeing patients were performing surgery at this time.  He is scheduled to see the new neurosurgeon later this week and hopefully he can get in for back surgery soon.  I reviewed the short-term disability paperwork with him and filled out.  His work as a  is labor-intensive and requires a lot of heavy lifting, frequent bending, stooping and use of ladders.  He is not going to be able to do this kind of work until symptoms improve, presumably after he rehabs from surgery.  At that point I anticipate he will need a period of work restrictions as he continues to recover.  His blood pressure appears stable at this time.  Pain is likely contributing to some of the elevated readings he gets.  We will continue to monitor blood pressure closely at future appointments.        40 minutes spent on the date of the encounter doing chart review, review of test results, patient visit and documentation              Return in about 6 weeks (around 6/9/2021) for Follow up.    Kp Duenas, [facility]   Dalton is a 40 year old who presents for the following health issues    HPI     Pt has disability extension forms to be filled out - see 4/22/21 Cynny message.     He has a history of pain  symptoms in his left buttock region which have been radiating down the left lower extremity (thigh and into the mid calf) since 2/26/2021. On 3/10/21 we checked an xray of the lumbar spine and sacrum which showed mild-moderate disc height loss at L5-S1, no sacroiliitis, no periarticular sclerosis or erosion.  On 3/10/2021 he had an MRI which showed left disc herniation at L5-S1 displacing the left S1 nerve root.  He was prescribed a 5-day course of prednisone which provided only short-term modest relief of symptoms.  He was seen by neurosurgery and completed 6 weeks of conservative therapy without relief of symptoms.  This included an epidural steroid injection on 3/24/2021 which did not help. Neurosurgery recommended pursuing an L5-S1 hemilaminectomy and microdiscectomy for treatment.  Unfortunately, his surgeon had to cancel the surgery and he was told that this provider would not be available to see patients in the near future.  He is scheduled to see 1 of this providers partners later this week.     He continues to struggle with significant pain and weakness from the low back into the left leg.  He is having difficulty walking because of this.  He has been taking Tylenol, ibuprofen and Vicodin as needed for breakthrough pain.  He also has a history of prehypertension.  He has had elevated blood pressure readings fairly recently at the dental office and an hour clinic over the past couple of months.  He denies headaches or blurry vision.  He is not having chest pains or shortness of breath symptoms.  He reports that his blood pressure has been better at home lately.  Today it was 138/82.          Review of Systems   Constitutional, HEENT, cardiovascular, pulmonary, GI, , musculoskeletal, neuro, skin, endocrine and psych systems are negative, except as otherwise noted.      Objective           Vitals:  No vitals were obtained today due to virtual visit.    Physical Exam   GENERAL: Healthy, alert.  Appears  moderately uncomfortable.  He is laying on a couch.  EYES: Eyes grossly normal to inspection.  No discharge or erythema, or obvious scleral/conjunctival abnormalities.  RESP: No audible wheeze, cough, or visible cyanosis.  No visible retractions or increased work of breathing.    SKIN: Visible skin clear. No significant rash, abnormal pigmentation or lesions.  NEURO: Cranial nerves grossly intact.  Mentation and speech appropriate for age.  PSYCH: Mentation appears normal, affect normal/bright, judgement and insight intact, normal speech and appearance well-groomed.                Video-Visit Details    Type of service:  Video Visit    Video End Time:4: 18    Originating Location (pt. Location): Home    Distant Location (provider location):  [facility]     Platform used for Video Visit: [employer]

## 2021-04-30 ENCOUNTER — OFFICE VISIT (OUTPATIENT)
Dept: NEUROSURGERY | Facility: CLINIC | Age: 41
End: 2021-04-30
Attending: NEUROLOGICAL SURGERY
Payer: COMMERCIAL

## 2021-04-30 VITALS
BODY MASS INDEX: 26.52 KG/M2 | WEIGHT: 175 LBS | TEMPERATURE: 98.5 F | DIASTOLIC BLOOD PRESSURE: 87 MMHG | SYSTOLIC BLOOD PRESSURE: 129 MMHG | HEART RATE: 86 BPM | HEIGHT: 68 IN | OXYGEN SATURATION: 98 %

## 2021-04-30 DIAGNOSIS — Z01.818 PRE-OP TESTING: Primary | ICD-10-CM

## 2021-04-30 DIAGNOSIS — M51.16 HERNIATION OF LUMBAR INTERVERTEBRAL DISC WITH RADICULOPATHY: ICD-10-CM

## 2021-04-30 PROCEDURE — G0463 HOSPITAL OUTPT CLINIC VISIT: HCPCS

## 2021-04-30 PROCEDURE — 99213 OFFICE O/P EST LOW 20 MIN: CPT | Performed by: NEUROLOGICAL SURGERY

## 2021-04-30 ASSESSMENT — MIFFLIN-ST. JEOR: SCORE: 1683.29

## 2021-04-30 ASSESSMENT — PAIN SCALES - GENERAL: PAINLEVEL: EXTREME PAIN (8)

## 2021-04-30 NOTE — PROGRESS NOTES
"It was a pleasure to see Dalton Tillman today in Neurosurgery Clinic. He is a 40 year old male with a left L5 S1 herniated disc and radiculopathy.  He was scheduled for surgery but unfortunately this had to be canceled suddenly.  He is here to see me to discuss rescheduling.  Please see Dr. Baker's notes for full details.    Vitals:    04/30/21 1448   BP: 129/87   Pulse: 86   Temp: 98.5  F (36.9  C)   SpO2: 98%   Weight: 79.4 kg (175 lb)   Height: 1.727 m (5' 8\")     Body mass index is 26.61 kg/m .  Extreme Pain (8)    Neurologically stable.    Imaging: MRI of the lumbar spine shows a left L5-S1 herniated disc which correlates with the symptoms.    Assessment: Lumbar herniated disc with radiculopathy.    Plan: We discussed surgery and I think that given his persistent symptoms a left L5-S1 minimally invasive microdiscectomy would be the best course of action.  We will work on trying to get him in as soon as possible.     "

## 2021-04-30 NOTE — PATIENT INSTRUCTIONS
Patient Next Steps:      Order Order placed for Upcoming surgery Left Lumbar 5-Sacral 1 Minimally invasive microdiscectomy with doctor Lizandro.    Please ensure you repeat COVID-19 test within 4 days of surgery and follow pre-operative teaching given to you prior    Please call us if you have any further questions or concerns.    [facility]   Phone: [phone]  Fax: [phone]

## 2021-04-30 NOTE — NURSING NOTE
"Dalton Tillman is a 40 year old male who presents for:  Chief Complaint   Patient presents with     Follow Up     Lumbar discuss surgery options Previous Samuel Patient        Initial Vitals:  /87   Pulse 86   Temp 98.5  F (36.9  C)   Ht 5' 8\" (1.727 m)   Wt 175 lb (79.4 kg)   SpO2 98%   BMI 26.61 kg/m   Estimated body mass index is 26.61 kg/m  as calculated from the following:    Height as of this encounter: 5' 8\" (1.727 m).    Weight as of this encounter: 175 lb (79.4 kg).. Body surface area is 1.95 meters squared. BP completed using cuff size: regular  Extreme Pain (8)    Nursing Comments: Patient presents for Lumbar discuss surgery options Previous Samuel Patient    Lakhwinder Leary MA  "

## 2021-04-30 NOTE — LETTER
"    4/30/2021         RE: Dalton Tillman  [address] [facility] [address]        Dear Colleague,    Thank you for referring your patient, Dalton Tillman, to the [facility] [place]. Please see a copy of my visit note below.    It was a pleasure to see Dalton Tillman today in Neurosurgery Clinic. He is a 40 year old male with a left L5 S1 herniated disc and radiculopathy.  He was scheduled for surgery but unfortunately this had to be canceled suddenly.  He is here to see me to discuss rescheduling.  Please see Dr. Baker's notes for full details.    Vitals:    04/30/21 1448   BP: 129/87   Pulse: 86   Temp: 98.5  F (36.9  C)   SpO2: 98%   Weight: 79.4 kg (175 lb)   Height: 1.727 m (5' 8\")     Body mass index is 26.61 kg/m .  Extreme Pain (8)    Neurologically stable.    Imaging: MRI of the lumbar spine shows a left L5-S1 herniated disc which correlates with the symptoms.    Assessment: Lumbar herniated disc with radiculopathy.    Plan: We discussed surgery and I think that given his persistent symptoms a left L5-S1 minimally invasive microdiscectomy would be the best course of action.  We will work on trying to get him in as soon as possible.         Again, thank you for allowing me to participate in the care of your patient.        Sincerely,        Filipe Bone MD    "

## 2021-05-07 DIAGNOSIS — Z11.59 ENCOUNTER FOR SCREENING FOR OTHER VIRAL DISEASES: ICD-10-CM

## 2021-05-10 NOTE — PROGRESS NOTES
[facility] [place]  [address]  [facility] [address]  Phone: [phone]  Primary Provider: Manjinder Rodriguez  Pre-op Performing Provider: SHANELL MORENO      PREOPERATIVE EVALUATION:  Today's date: 5/11/2021    Dalton Tillman is a 40 year old male who presents for a preoperative evaluation.    Surgical Information:  Surgery/Procedure: left lumbar 5-sacral 1 minimally invasive microdiscectomy  Surgery Location: [facility]  Surgeon: Dr. Bone  Surgery Date: 5/19/2021  Time of Surgery: 3:25 PM   Where patient plans to recover: At home with family  Fax number for surgical facility: Note does not need to be faxed, will be available electronically in Epic.    Type of Anesthesia Anticipated: General    Assessment & Plan     The proposed surgical procedure is considered INTERMEDIATE risk.    Preop general physical exam  He is cleared to proceed with back surgery as scheduled.    Herniation of intervertebral disc between L5 and S1  He is cleared to proceed with back surgery as scheduled.  He was given a refill of Vicodin that he may use as needed for breakthrough pain.  He will hold ibuprofen for the week prior to surgery.  - Hemoglobin  - Basic metabolic panel  - HYDROcodone-acetaminophen (NORCO) 5-325 MG tablet; Take 1 tablet by mouth every 4 hours as needed for pain    Lumbosacral radiculopathy at S1    Essential hypertension, benign  Blood pressure is well controlled on lisinopril 20 mg daily.  - lisinopril (ZESTRIL) 20 MG tablet; Take 1 tablet (20 mg) by mouth daily                 RECOMMENDATION:  APPROVAL GIVEN to proceed with proposed procedure, without further diagnostic evaluation.    40 minutes spent on the date of the encounter doing chart review, review of test results, interpretation of tests, patient visit and documentation         Subjective     HPI related to upcoming procedure: He has a history of pain symptoms in his left buttock  region which have been radiating down the left lower extremity (thigh and into the mid calf) since 2/26/2021. On 3/10/21 we checked an xray of the lumbar spine and sacrum which showed mild-moderate disc height loss at L5-S1, no sacroiliitis, no periarticular sclerosis or erosion.  On 3/10/2021 he had an MRI which showed left disc herniation at L5-S1 displacing the left S1 nerve root.  He was prescribed a 5-day course of prednisone which provided only short-term modest relief of symptoms.  He was seen by neurosurgery and completed 6 weeks of conservative therapy without relief of symptoms.  He was last seen by neurosurgery on 4/5/2021 and they recommended pursuing an L5-S1 hemilaminectomy and microdiscectomy for treatment.  He continues to struggle with significant pain and weakness from the low back into the left leg.  He is having difficulty walking because of this.  He has been taking Tylenol, ibuprofen and Vicodin as needed for breakthrough pain.  He also has a history of hypertension.  He denies headaches or blurry vision.  He is not having chest pains or shortness of breath symptoms.  He was recently started on lisinopril 20 mg daily which she is tolerating well.       Preop Questions 5/10/2021   1. Have you ever had a heart attack or stroke? No   2. Have you ever had surgery on your heart or blood vessels, such as a stent placement, a coronary artery bypass, or surgery on an artery in your head, neck, heart, or legs? No   3. Do you have chest pain with activity? No   4. Do you have a history of  heart failure? No   5. Do you currently have a cold, bronchitis or symptoms of other infection? No   6. Do you have a cough, shortness of breath, or wheezing? No   7. Do you or anyone in your family have previous history of blood clots? No   8. Do you or does anyone in your family have a serious bleeding problem such as prolonged bleeding following surgeries or cuts? No   9. Have you ever had problems with anemia or  been told to take iron pills? No   10. Have you had any abnormal blood loss such as black, tarry or bloody stools? No   11. Have you ever had a blood transfusion? No   12. Are you willing to have a blood transfusion if it is medically needed before, during, or after your surgery? Yes   13. Have you or any of your relatives ever had problems with anesthesia? No   14. Do you have sleep apnea, excessive snoring or daytime drowsiness? No   15. Do you have any artifical heart valves or other implanted medical devices like a pacemaker, defibrillator, or continuous glucose monitor? No   16. Do you have artificial joints? No   17. Are you allergic to latex? No             Review of Systems  CONSTITUTIONAL: NEGATIVE for fever, chills, change in weight  INTEGUMENTARY/SKIN: NEGATIVE for worrisome rashes, moles or lesions  EYES: NEGATIVE for vision changes or irritation  ENT/MOUTH: NEGATIVE for ear, mouth and throat problems  RESP: NEGATIVE for significant cough or SOB  BREAST: NEGATIVE for masses, tenderness or discharge  CV: NEGATIVE for chest pain, palpitations or peripheral edema  GI: NEGATIVE for nausea, abdominal pain, heartburn, or change in bowel habits  : NEGATIVE for frequency, dysuria, or hematuria  MUSCULOSKELETAL: NEGATIVE for significant arthralgias or myalgia  NEURO: NEGATIVE for weakness, dizziness or paresthesias  ENDOCRINE: NEGATIVE for temperature intolerance, skin/hair changes  HEME: NEGATIVE for bleeding problems  PSYCHIATRIC: NEGATIVE for changes in mood or affect    Patient Active Problem List    Diagnosis Date Noted     Herniation of lumbar intervertebral disc with radiculopathy 04/08/2021     Priority: Medium     Added automatically from request for surgery [id number]       Lumbosacral radiculopathy at S1 03/17/2021     Priority: Medium     Herniation of intervertebral disc between L5 and S1 03/17/2021     Priority: Medium     CARDIOVASCULAR SCREENING; LDL GOAL LESS THAN 160 09/07/2012     Priority:  "Medium      No past medical history on file.  Past Surgical History:   Procedure Laterality Date     ORTHOPEDIC SURGERY  2016    left wrist     Current Outpatient Medications   Medication Sig Dispense Refill     HYDROcodone-acetaminophen (NORCO) 5-325 MG tablet Take 1 tablet by mouth every 4 hours as needed for pain 20 tablet 0     lisinopril (ZESTRIL) 20 MG tablet Take 1 tablet (20 mg) by mouth daily 90 tablet 3     ibuprofen (ADVIL/MOTRIN) 200 MG capsule Take 200 mg by mouth every 4 hours as needed for fever         No Known Allergies     Social History     Tobacco Use     Smoking status: Former Smoker     Packs/day: 0.00     Years: 0.00     Pack years: 0.00     Quit date: 10/15/2016     Years since quittin.5     Smokeless tobacco: Never Used   Substance Use Topics     Alcohol use: Yes     Alcohol/week: 0.0 standard drinks     Comment: Socailly       History   Drug Use     Types: Marijuana     Comment: Marijuana         Objective     /88   Pulse 108   Temp 99  F (37.2  C) (Tympanic)   Resp 16   Ht 1.727 m (5' 8\")   Wt 80.3 kg (177 lb)   SpO2 97%   BMI 26.91 kg/m      Physical Exam   GENERAL APPEARANCE: healthy, alert and no distress     EYES: EOMI,  PERRL     HENT:  nose and mouth without ulcers or lesions     NECK: no adenopathy, no asymmetry, masses, or scars and thyroid normal to palpation     RESP: lungs clear to auscultation - no rales, rhonchi or wheezes     CV: regular rates and rhythm, normal S1 S2, no S3 or S4 and no murmur, click or rub     ABDOMEN:  soft, nontender, no HSM or masses and bowel sounds normal     MS: Gait is antalgic and unsteady.  He uses a cane to ambulate. Straight leg raise positive on the left.  Nontender over the thoracic and lumbar spinous processes.     SKIN: no suspicious lesions or rashes     NEURO: Normal strength and tone, sensory exam grossly normal, mentation intact and speech normal.  Deep tendon reflexes 24 bilaterally.     PSYCH: mentation appears " normal. and affect normal/bright     LYMPHATICS: No cervical adenopathy    Recent Labs   Lab Test 04/07/21  1326   HGB 16.7      POTASSIUM 3.6   CR 0.88        Diagnostics:  Recent Results (from the past 48 hour(s))   Hemoglobin    Collection Time: 05/11/21  3:15 PM   Result Value Ref Range    Hemoglobin 15.0 13.3 - 17.7 g/dL   Basic metabolic panel    Collection Time: 05/11/21  3:15 PM   Result Value Ref Range    Sodium 138 133 - 144 mmol/L    Potassium 3.6 3.4 - 5.3 mmol/L    Chloride 106 94 - 109 mmol/L    Carbon Dioxide 25 20 - 32 mmol/L    Anion Gap 7 3 - 14 mmol/L    Glucose 115 (H) 70 - 99 mg/dL    Urea Nitrogen 14 7 - 30 mg/dL    Creatinine 0.86 0.66 - 1.25 mg/dL    GFR Estimate >90 >60 mL/min/[1.73_m2]    GFR Estimate If Black >90 >60 mL/min/[1.73_m2]    Calcium 8.9 8.5 - 10.1 mg/dL      No EKG required, no history of coronary heart disease, significant arrhythmia, peripheral arterial disease or other structural heart disease.    Revised Cardiac Risk Index (RCRI):  The patient has the following serious cardiovascular risks for perioperative complications:   - No serious cardiac risks = 0 points     RCRI Interpretation: 0 points: Class I (very low risk - 0.4% complication rate)           Signed Electronically by: Kp Duenas DO  Copy of this evaluation report is provided to requesting physician.

## 2021-05-10 NOTE — PATIENT INSTRUCTIONS

## 2021-05-11 ENCOUNTER — OFFICE VISIT (OUTPATIENT)
Dept: FAMILY MEDICINE | Facility: CLINIC | Age: 41
End: 2021-05-11
Payer: COMMERCIAL

## 2021-05-11 VITALS
HEIGHT: 68 IN | RESPIRATION RATE: 16 BRPM | DIASTOLIC BLOOD PRESSURE: 88 MMHG | SYSTOLIC BLOOD PRESSURE: 133 MMHG | WEIGHT: 177 LBS | TEMPERATURE: 99 F | HEART RATE: 108 BPM | BODY MASS INDEX: 26.83 KG/M2 | OXYGEN SATURATION: 97 %

## 2021-05-11 DIAGNOSIS — M51.27 HERNIATION OF INTERVERTEBRAL DISC BETWEEN L5 AND S1: ICD-10-CM

## 2021-05-11 DIAGNOSIS — Z01.818 PREOP GENERAL PHYSICAL EXAM: Primary | ICD-10-CM

## 2021-05-11 DIAGNOSIS — I10 ESSENTIAL HYPERTENSION, BENIGN: ICD-10-CM

## 2021-05-11 DIAGNOSIS — M54.17 LUMBOSACRAL RADICULOPATHY AT S1: ICD-10-CM

## 2021-05-11 LAB — HGB BLD-MCNC: 15 G/DL (ref 13.3–17.7)

## 2021-05-11 PROCEDURE — 85018 HEMOGLOBIN: CPT | Performed by: FAMILY MEDICINE

## 2021-05-11 PROCEDURE — 80048 BASIC METABOLIC PNL TOTAL CA: CPT | Performed by: FAMILY MEDICINE

## 2021-05-11 PROCEDURE — 99215 OFFICE O/P EST HI 40 MIN: CPT | Performed by: FAMILY MEDICINE

## 2021-05-11 PROCEDURE — 36415 COLL VENOUS BLD VENIPUNCTURE: CPT | Performed by: FAMILY MEDICINE

## 2021-05-11 RX ORDER — LISINOPRIL 20 MG/1
20 TABLET ORAL DAILY
Qty: 90 TABLET | Refills: 3 | Status: SHIPPED | OUTPATIENT
Start: 2021-05-11 | End: 2022-07-07

## 2021-05-11 RX ORDER — HYDROCODONE BITARTRATE AND ACETAMINOPHEN 5; 325 MG/1; MG/1
1 TABLET ORAL EVERY 4 HOURS PRN
Qty: 20 TABLET | Refills: 0 | Status: ON HOLD | OUTPATIENT
Start: 2021-05-11 | End: 2021-05-19

## 2021-05-11 ASSESSMENT — MIFFLIN-ST. JEOR: SCORE: 1692.37

## 2021-05-11 NOTE — NURSING NOTE
"Chief Complaint   Patient presents with     Pre-Op Exam     5/19/21 back     initial BP (!) 146/89 (BP Location: Right arm, Cuff Size: Adult Regular)   Pulse 108   Temp 99  F (37.2  C) (Tympanic)   Resp 16   Ht 1.727 m (5' 8\")   Wt 80.3 kg (177 lb)   SpO2 97%   BMI 26.91 kg/m   Estimated body mass index is 26.91 kg/m  as calculated from the following:    Height as of this encounter: 1.727 m (5' 8\").    Weight as of this encounter: 80.3 kg (177 lb).  BP completed using cuff size: regular.  R arm      Health Maintenance that is potentially due pending provider review:  NONE    n/a    Rigoberto Garcia ma  "

## 2021-05-12 ENCOUNTER — ANESTHESIA EVENT (OUTPATIENT)
Dept: SURGERY | Facility: CLINIC | Age: 41
End: 2021-05-12
Payer: COMMERCIAL

## 2021-05-12 LAB
ANION GAP SERPL CALCULATED.3IONS-SCNC: 7 MMOL/L (ref 3–14)
BUN SERPL-MCNC: 14 MG/DL (ref 7–30)
CALCIUM SERPL-MCNC: 8.9 MG/DL (ref 8.5–10.1)
CHLORIDE SERPL-SCNC: 106 MMOL/L (ref 94–109)
CO2 SERPL-SCNC: 25 MMOL/L (ref 20–32)
CREAT SERPL-MCNC: 0.86 MG/DL (ref 0.66–1.25)
GFR SERPL CREATININE-BSD FRML MDRD: >90 ML/MIN/{1.73_M2}
GLUCOSE SERPL-MCNC: 115 MG/DL (ref 70–99)
POTASSIUM SERPL-SCNC: 3.6 MMOL/L (ref 3.4–5.3)
SODIUM SERPL-SCNC: 138 MMOL/L (ref 133–144)

## 2021-05-16 DIAGNOSIS — Z11.59 ENCOUNTER FOR SCREENING FOR OTHER VIRAL DISEASES: ICD-10-CM

## 2021-05-16 LAB
SARS-COV-2 RNA RESP QL NAA+PROBE: NORMAL
SPECIMEN SOURCE: NORMAL

## 2021-05-16 PROCEDURE — U0003 INFECTIOUS AGENT DETECTION BY NUCLEIC ACID (DNA OR RNA); SEVERE ACUTE RESPIRATORY SYNDROME CORONAVIRUS 2 (SARS-COV-2) (CORONAVIRUS DISEASE [COVID-19]), AMPLIFIED PROBE TECHNIQUE, MAKING USE OF HIGH THROUGHPUT TECHNOLOGIES AS DESCRIBED BY CMS-2020-01-R: HCPCS | Performed by: NEUROLOGICAL SURGERY

## 2021-05-16 PROCEDURE — U0005 INFEC AGEN DETEC AMPLI PROBE: HCPCS | Performed by: NEUROLOGICAL SURGERY

## 2021-05-17 LAB
LABORATORY COMMENT REPORT: NORMAL
SARS-COV-2 RNA RESP QL NAA+PROBE: NEGATIVE
SPECIMEN SOURCE: NORMAL

## 2021-05-19 ENCOUNTER — HOSPITAL ENCOUNTER (OUTPATIENT)
Facility: CLINIC | Age: 41
Discharge: HOME | End: 2021-05-19
Attending: NEUROLOGICAL SURGERY | Admitting: NEUROLOGICAL SURGERY
Payer: COMMERCIAL

## 2021-05-19 ENCOUNTER — ANESTHESIA (OUTPATIENT)
Dept: SURGERY | Facility: CLINIC | Age: 41
End: 2021-05-19
Payer: COMMERCIAL

## 2021-05-19 ENCOUNTER — APPOINTMENT (OUTPATIENT)
Dept: GENERAL RADIOLOGY | Facility: CLINIC | Age: 41
End: 2021-05-19
Attending: NEUROLOGICAL SURGERY
Payer: COMMERCIAL

## 2021-05-19 VITALS
BODY MASS INDEX: 26.36 KG/M2 | DIASTOLIC BLOOD PRESSURE: 80 MMHG | RESPIRATION RATE: 14 BRPM | HEIGHT: 68 IN | SYSTOLIC BLOOD PRESSURE: 145 MMHG | HEART RATE: 89 BPM | WEIGHT: 173.94 LBS | OXYGEN SATURATION: 93 % | TEMPERATURE: 98.3 F

## 2021-05-19 DIAGNOSIS — M51.27 HERNIATION OF INTERVERTEBRAL DISC BETWEEN L5 AND S1: ICD-10-CM

## 2021-05-19 DIAGNOSIS — M51.16 HERNIATION OF LUMBAR INTERVERTEBRAL DISC WITH RADICULOPATHY: ICD-10-CM

## 2021-05-19 PROCEDURE — 710N000009 HC RECOVERY PHASE 1, LEVEL 1, PER MIN: Performed by: NEUROLOGICAL SURGERY

## 2021-05-19 PROCEDURE — 710N000012 HC RECOVERY PHASE 2, PER MINUTE: Performed by: NEUROLOGICAL SURGERY

## 2021-05-19 PROCEDURE — 999N000179 XR SURGERY CARM FLUORO LESS THAN 5 MIN W STILLS: Mod: TC

## 2021-05-19 PROCEDURE — 250N000011 HC RX IP 250 OP 636: Performed by: NEUROLOGICAL SURGERY

## 2021-05-19 PROCEDURE — 360N000084 HC SURGERY LEVEL 4 W/ FLUORO, PER MIN: Performed by: NEUROLOGICAL SURGERY

## 2021-05-19 PROCEDURE — 370N000017 HC ANESTHESIA TECHNICAL FEE, PER MIN: Performed by: NEUROLOGICAL SURGERY

## 2021-05-19 PROCEDURE — 250N000013 HC RX MED GY IP 250 OP 250 PS 637: Performed by: PHYSICIAN ASSISTANT

## 2021-05-19 PROCEDURE — 272N000001 HC OR GENERAL SUPPLY STERILE: Performed by: NEUROLOGICAL SURGERY

## 2021-05-19 PROCEDURE — 250N000011 HC RX IP 250 OP 636: Performed by: NURSE ANESTHETIST, CERTIFIED REGISTERED

## 2021-05-19 PROCEDURE — 63030 LAMOT DCMPRN NRV RT 1 LMBR: CPT | Mod: LT | Performed by: NEUROLOGICAL SURGERY

## 2021-05-19 PROCEDURE — 250N000009 HC RX 250: Performed by: NURSE ANESTHETIST, CERTIFIED REGISTERED

## 2021-05-19 PROCEDURE — 258N000003 HC RX IP 258 OP 636: Performed by: ANESTHESIOLOGY

## 2021-05-19 PROCEDURE — 63030 LAMOT DCMPRN NRV RT 1 LMBR: CPT | Mod: AS | Performed by: PHYSICIAN ASSISTANT

## 2021-05-19 PROCEDURE — 250N000013 HC RX MED GY IP 250 OP 250 PS 637: Performed by: NEUROLOGICAL SURGERY

## 2021-05-19 PROCEDURE — 250N000009 HC RX 250: Performed by: NEUROLOGICAL SURGERY

## 2021-05-19 PROCEDURE — 999N000141 HC STATISTIC PRE-PROCEDURE NURSING ASSESSMENT: Performed by: NEUROLOGICAL SURGERY

## 2021-05-19 PROCEDURE — 250N000005 HC OR RX SURGIFLO HEMOSTATIC MATRIX 10ML 199102S OPNP: Performed by: NEUROLOGICAL SURGERY

## 2021-05-19 RX ORDER — IBUPROFEN 600 MG/1
600 TABLET, FILM COATED ORAL
Status: COMPLETED | OUTPATIENT
Start: 2021-05-19 | End: 2021-05-19

## 2021-05-19 RX ORDER — LIDOCAINE HYDROCHLORIDE 10 MG/ML
INJECTION, SOLUTION INFILTRATION; PERINEURAL PRN
Status: DISCONTINUED | OUTPATIENT
Start: 2021-05-19 | End: 2021-05-19

## 2021-05-19 RX ORDER — IBUPROFEN 800 MG/1
800 TABLET, FILM COATED ORAL EVERY 8 HOURS PRN
Qty: 42 TABLET | Refills: 0 | Status: SHIPPED | OUTPATIENT
Start: 2021-05-19 | End: 2021-10-15

## 2021-05-19 RX ORDER — LABETALOL HYDROCHLORIDE 5 MG/ML
10 INJECTION, SOLUTION INTRAVENOUS EVERY 5 MIN PRN
Status: DISCONTINUED | OUTPATIENT
Start: 2021-05-19 | End: 2021-05-19 | Stop reason: HOSPADM

## 2021-05-19 RX ORDER — OXYCODONE HYDROCHLORIDE 5 MG/1
5 TABLET ORAL
Status: COMPLETED | OUTPATIENT
Start: 2021-05-19 | End: 2021-05-19

## 2021-05-19 RX ORDER — NALOXONE HYDROCHLORIDE 0.4 MG/ML
0.4 INJECTION, SOLUTION INTRAMUSCULAR; INTRAVENOUS; SUBCUTANEOUS
Status: DISCONTINUED | OUTPATIENT
Start: 2021-05-19 | End: 2021-05-19 | Stop reason: HOSPADM

## 2021-05-19 RX ORDER — METHOCARBAMOL 500 MG/1
500 TABLET, FILM COATED ORAL 4 TIMES DAILY PRN
Qty: 20 TABLET | Refills: 0 | Status: SHIPPED | OUTPATIENT
Start: 2021-05-19 | End: 2021-05-25

## 2021-05-19 RX ORDER — HYDROXYZINE HYDROCHLORIDE 25 MG/1
25 TABLET, FILM COATED ORAL
Status: COMPLETED | OUTPATIENT
Start: 2021-05-19 | End: 2021-05-19

## 2021-05-19 RX ORDER — ONDANSETRON 2 MG/ML
INJECTION INTRAMUSCULAR; INTRAVENOUS PRN
Status: DISCONTINUED | OUTPATIENT
Start: 2021-05-19 | End: 2021-05-19

## 2021-05-19 RX ORDER — CEFAZOLIN SODIUM 2 G/100ML
2 INJECTION, SOLUTION INTRAVENOUS
Status: DISCONTINUED | OUTPATIENT
Start: 2021-05-19 | End: 2021-05-19 | Stop reason: HOSPADM

## 2021-05-19 RX ORDER — BUPIVACAINE HYDROCHLORIDE AND EPINEPHRINE 2.5; 5 MG/ML; UG/ML
INJECTION, SOLUTION INFILTRATION; PERINEURAL PRN
Status: DISCONTINUED | OUTPATIENT
Start: 2021-05-19 | End: 2021-05-19 | Stop reason: HOSPADM

## 2021-05-19 RX ORDER — FENTANYL CITRATE 50 UG/ML
25-50 INJECTION, SOLUTION INTRAMUSCULAR; INTRAVENOUS
Status: DISCONTINUED | OUTPATIENT
Start: 2021-05-19 | End: 2021-05-19 | Stop reason: HOSPADM

## 2021-05-19 RX ORDER — ACETAMINOPHEN 325 MG/1
975 TABLET ORAL ONCE
Status: COMPLETED | OUTPATIENT
Start: 2021-05-19 | End: 2021-05-19

## 2021-05-19 RX ORDER — HYDROCODONE BITARTRATE AND ACETAMINOPHEN 5; 325 MG/1; MG/1
1 TABLET ORAL EVERY 4 HOURS PRN
Qty: 20 TABLET | Refills: 0 | Status: SHIPPED | OUTPATIENT
Start: 2021-05-19 | End: 2021-05-25

## 2021-05-19 RX ORDER — SODIUM CHLORIDE, SODIUM LACTATE, POTASSIUM CHLORIDE, CALCIUM CHLORIDE 600; 310; 30; 20 MG/100ML; MG/100ML; MG/100ML; MG/100ML
INJECTION, SOLUTION INTRAVENOUS CONTINUOUS
Status: DISCONTINUED | OUTPATIENT
Start: 2021-05-19 | End: 2021-05-19 | Stop reason: HOSPADM

## 2021-05-19 RX ORDER — ONDANSETRON 2 MG/ML
4 INJECTION INTRAMUSCULAR; INTRAVENOUS EVERY 30 MIN PRN
Status: DISCONTINUED | OUTPATIENT
Start: 2021-05-19 | End: 2021-05-19 | Stop reason: HOSPADM

## 2021-05-19 RX ORDER — PROPOFOL 10 MG/ML
INJECTION, EMULSION INTRAVENOUS PRN
Status: DISCONTINUED | OUTPATIENT
Start: 2021-05-19 | End: 2021-05-19

## 2021-05-19 RX ORDER — HYDROMORPHONE HYDROCHLORIDE 1 MG/ML
.3-.5 INJECTION, SOLUTION INTRAMUSCULAR; INTRAVENOUS; SUBCUTANEOUS EVERY 5 MIN PRN
Status: DISCONTINUED | OUTPATIENT
Start: 2021-05-19 | End: 2021-05-19 | Stop reason: HOSPADM

## 2021-05-19 RX ORDER — DEXAMETHASONE SODIUM PHOSPHATE 4 MG/ML
INJECTION, SOLUTION INTRA-ARTICULAR; INTRALESIONAL; INTRAMUSCULAR; INTRAVENOUS; SOFT TISSUE PRN
Status: DISCONTINUED | OUTPATIENT
Start: 2021-05-19 | End: 2021-05-19

## 2021-05-19 RX ORDER — GLYCOPYRROLATE 0.2 MG/ML
INJECTION, SOLUTION INTRAMUSCULAR; INTRAVENOUS PRN
Status: DISCONTINUED | OUTPATIENT
Start: 2021-05-19 | End: 2021-05-19

## 2021-05-19 RX ORDER — FENTANYL CITRATE 50 UG/ML
INJECTION, SOLUTION INTRAMUSCULAR; INTRAVENOUS PRN
Status: DISCONTINUED | OUTPATIENT
Start: 2021-05-19 | End: 2021-05-19

## 2021-05-19 RX ORDER — ONDANSETRON 4 MG/1
4 TABLET, ORALLY DISINTEGRATING ORAL
Status: DISCONTINUED | OUTPATIENT
Start: 2021-05-19 | End: 2021-05-19 | Stop reason: HOSPADM

## 2021-05-19 RX ORDER — PROPOFOL 10 MG/ML
INJECTION, EMULSION INTRAVENOUS CONTINUOUS PRN
Status: DISCONTINUED | OUTPATIENT
Start: 2021-05-19 | End: 2021-05-19

## 2021-05-19 RX ORDER — NALOXONE HYDROCHLORIDE 0.4 MG/ML
0.2 INJECTION, SOLUTION INTRAMUSCULAR; INTRAVENOUS; SUBCUTANEOUS
Status: DISCONTINUED | OUTPATIENT
Start: 2021-05-19 | End: 2021-05-19 | Stop reason: HOSPADM

## 2021-05-19 RX ORDER — CEFAZOLIN SODIUM 2 G/100ML
2 INJECTION, SOLUTION INTRAVENOUS SEE ADMIN INSTRUCTIONS
Status: DISCONTINUED | OUTPATIENT
Start: 2021-05-19 | End: 2021-05-19 | Stop reason: HOSPADM

## 2021-05-19 RX ORDER — MEPERIDINE HYDROCHLORIDE 25 MG/ML
12.5 INJECTION INTRAMUSCULAR; INTRAVENOUS; SUBCUTANEOUS
Status: DISCONTINUED | OUTPATIENT
Start: 2021-05-19 | End: 2021-05-19 | Stop reason: HOSPADM

## 2021-05-19 RX ORDER — ONDANSETRON 4 MG/1
4 TABLET, ORALLY DISINTEGRATING ORAL EVERY 30 MIN PRN
Status: DISCONTINUED | OUTPATIENT
Start: 2021-05-19 | End: 2021-05-19 | Stop reason: HOSPADM

## 2021-05-19 RX ORDER — DOCUSATE SODIUM 100 MG/1
100 CAPSULE, LIQUID FILLED ORAL 2 TIMES DAILY PRN
Qty: 20 CAPSULE | Refills: 0 | Status: SHIPPED | OUTPATIENT
Start: 2021-05-19 | End: 2021-08-20

## 2021-05-19 RX ORDER — LIDOCAINE 40 MG/G
CREAM TOPICAL
Status: DISCONTINUED | OUTPATIENT
Start: 2021-05-19 | End: 2021-05-19 | Stop reason: HOSPADM

## 2021-05-19 RX ADMIN — GLYCOPYRROLATE 0.2 MG: 0.2 INJECTION, SOLUTION INTRAMUSCULAR; INTRAVENOUS at 14:44

## 2021-05-19 RX ADMIN — MIDAZOLAM 2 MG: 1 INJECTION INTRAMUSCULAR; INTRAVENOUS at 14:28

## 2021-05-19 RX ADMIN — OXYCODONE HYDROCHLORIDE 5 MG: 5 TABLET ORAL at 16:18

## 2021-05-19 RX ADMIN — HYDROMORPHONE HYDROCHLORIDE 1 MG: 1 INJECTION, SOLUTION INTRAMUSCULAR; INTRAVENOUS; SUBCUTANEOUS at 15:18

## 2021-05-19 RX ADMIN — PROPOFOL 200 MG: 10 INJECTION, EMULSION INTRAVENOUS at 14:44

## 2021-05-19 RX ADMIN — SODIUM CHLORIDE, POTASSIUM CHLORIDE, SODIUM LACTATE AND CALCIUM CHLORIDE: 600; 310; 30; 20 INJECTION, SOLUTION INTRAVENOUS at 13:45

## 2021-05-19 RX ADMIN — FENTANYL CITRATE 100 MCG: 50 INJECTION, SOLUTION INTRAMUSCULAR; INTRAVENOUS at 15:06

## 2021-05-19 RX ADMIN — IBUPROFEN 600 MG: 600 TABLET, FILM COATED ORAL at 16:18

## 2021-05-19 RX ADMIN — ONDANSETRON HYDROCHLORIDE 4 MG: 2 INJECTION, SOLUTION INTRAVENOUS at 15:23

## 2021-05-19 RX ADMIN — LIDOCAINE HYDROCHLORIDE 50 MG: 10 INJECTION, SOLUTION INFILTRATION; PERINEURAL at 14:44

## 2021-05-19 RX ADMIN — DEXAMETHASONE SODIUM PHOSPHATE 4 MG: 4 INJECTION, SOLUTION INTRA-ARTICULAR; INTRALESIONAL; INTRAMUSCULAR; INTRAVENOUS; SOFT TISSUE at 14:45

## 2021-05-19 RX ADMIN — DEXAMETHASONE SODIUM PHOSPHATE 4 MG: 4 INJECTION, SOLUTION INTRA-ARTICULAR; INTRALESIONAL; INTRAMUSCULAR; INTRAVENOUS; SOFT TISSUE at 14:55

## 2021-05-19 RX ADMIN — HYDROXYZINE HYDROCHLORIDE 25 MG: 25 TABLET, FILM COATED ORAL at 16:18

## 2021-05-19 RX ADMIN — FENTANYL CITRATE 100 MCG: 50 INJECTION, SOLUTION INTRAMUSCULAR; INTRAVENOUS at 14:44

## 2021-05-19 RX ADMIN — PROPOFOL 50 MCG/KG/MIN: 10 INJECTION, EMULSION INTRAVENOUS at 14:56

## 2021-05-19 RX ADMIN — ROCURONIUM BROMIDE 50 MG: 10 INJECTION INTRAVENOUS at 14:45

## 2021-05-19 RX ADMIN — CEFAZOLIN SODIUM 2 G: 2 INJECTION, SOLUTION INTRAVENOUS at 14:51

## 2021-05-19 RX ADMIN — ACETAMINOPHEN 975 MG: 325 TABLET, FILM COATED ORAL at 12:12

## 2021-05-19 ASSESSMENT — MIFFLIN-ST. JEOR: SCORE: 1678.5

## 2021-05-19 NOTE — ANESTHESIA POSTPROCEDURE EVALUATION
Patient: Dalton Tillman    Procedure(s):  Left Lumbar 5-Sacral 1 Minimally invasive microdiscectomy    Diagnosis:Herniation of lumbar intervertebral disc with radiculopathy [M51.16]  Diagnosis Additional Information: No value filed.    Anesthesia Type:  General    Note:  Disposition: Outpatient   Postop Pain Control: Uneventful            Sign Out: Well controlled pain   PONV: No   Neuro/Psych: Uneventful            Sign Out: Acceptable/Baseline neuro status   Airway/Respiratory: Uneventful            Sign Out: Acceptable/Baseline resp. status   CV/Hemodynamics: Uneventful            Sign Out: Acceptable CV status   Other NRE: NONE   DID A NON-ROUTINE EVENT OCCUR? No           Last vitals:  Vitals:    05/19/21 1600 05/19/21 1615 05/19/21 1630   BP: (!) 151/94 (!) 147/89    Pulse: 107 97 89   Resp: 13 13 14   Temp:   97.3  F (36.3  C)   SpO2: 100% 98% 93%       Last vitals prior to Anesthesia Care Transfer:  CRNA VITALS  5/19/2021 1512 - 5/19/2021 1612      5/19/2021             SpO2:  100 %          Electronically Signed By: Jose Juarez MD  May 19, 2021  4:45 PM

## 2021-05-19 NOTE — OP NOTE
Procedure Date: 05/19/2021    PREOPERATIVE DIAGNOSIS:  Left L5-S1 herniated disc with radiculopathy.    POSTOPERATIVE DIAGNOSIS:  Left L5-S1 herniated disc with radiculopathy.    PROCEDURE:  Left L5-S1 minimally invasive microdiscectomy.    SURGEON:  Filipe Bone MD    ASSISTANTS:  1.  Ubaldo Celaya  2.  Jennifer Weston    ANESTHESIA:  General endotracheal anesthesia.    ESTIMATED BLOOD LOSS:  5 mL.    INDICATIONS FOR PROCEDURE:  The patient is a 40-year-old male with left lower extremity pain and a large herniated disc on the left at L5-S1.  He was brought for a minimally invasive discectomy. Please note that Ubaldo Celaya PA-C's assistance was needed for positioning, retraction, suctioning, and closure.     DESCRIPTION OF PROCEDURE:  The patient was brought to the operating room.  General endotracheal anesthesia was induced.  The patient was rolled in prone position on the Eric frame.  The back was prepped and draped in sterile fashion.  C-arm fluoroscopy was used to localize L5-S1 level and a left sided paramedian approach was performed using the [employer] tubular dilating system.  Once this was done and the level again confirmed with fluoroscopy, the microscope was sterilely draped and brought into the field.  The high-speed drill was used to perform a laminotomy and medial facetectomy on the left at L5-S1.  The ligamentum flavum was elevated and resected and the underlying nerve root and thecal sac identified.  These were retracted medially and a large herniated disc fragment was noted underneath.  This was removed in several large pieces with no residual compression of the nerve noted after removal of the sequestered fragments.  Once this was done, hemostasis was achieved.  The fascia was closed with 0 Vicryl, 2-0 inverted interrupted Vicryl was used for the subcutaneous layer, subcuticular 4-0 Monocryl was used for skin and Exofin placed as a dressing.  The patient was awakened, extubated, and taken to  the recovery room in good condition.    Filipe Bone MD        D: 2021   T: 2021   MT: MONIKA    Name:     DELFINO ROWE  MRN:      [id number]        Account:        [id number]   :      1980           Procedure Date: 2021     Document: [id number]

## 2021-05-19 NOTE — ANESTHESIA CARE TRANSFER NOTE
Patient: Dalton Tillman    Procedure(s):  Left Lumbar 5-Sacral 1 Minimally invasive microdiscectomy    Diagnosis: Herniation of lumbar intervertebral disc with radiculopathy [M51.16]  Diagnosis Additional Information: No value filed.    Anesthesia Type:   General     Note:    Oropharynx: oropharynx clear of all foreign objects and spontaneously breathing  Level of Consciousness: drowsy  Oxygen Supplementation: face mask  Level of Supplemental Oxygen (L/min / FiO2): 6  Independent Airway: airway patency satisfactory and stable  Dentition: dentition unchanged  Vital Signs Stable: post-procedure vital signs reviewed and stable  Report to RN Given: handoff report given  Patient transferred to: PACU    Handoff Report: Identifed the Patient, Identified the Reponsible Provider, Reviewed the pertinent medical history, Discussed the surgical course, Reviewed Intra-OP anesthesia mangement and issues during anesthesia, Set expectations for post-procedure period and Allowed opportunity for questions and acknowledgement of understanding      Vitals: (Last set prior to Anesthesia Care Transfer)  CRNA VITALS  5/19/2021 1512 - 5/19/2021 1547      5/19/2021             SpO2:  100 %        Electronically Signed By: MASTER Damian CRNA  May 19, 2021  3:47 PM

## 2021-05-19 NOTE — ANESTHESIA PREPROCEDURE EVALUATION
Anesthesia Pre-Procedure Evaluation    Patient: Dalton Tillman   MRN: [id number] : 1980        Preoperative Diagnosis: Herniation of lumbar intervertebral disc with radiculopathy [M51.16]   Procedure : Procedure(s):  Left Lumbar 5-Sacral 1 Minimally invasive microdiscectomy     Past Medical History:   Diagnosis Date     Hypertension       Past Surgical History:   Procedure Laterality Date     ORTHOPEDIC SURGERY  2016    left wrist      No Known Allergies   Social History     Tobacco Use     Smoking status: Former Smoker     Packs/day: 0.00     Years: 0.00     Pack years: 0.00     Quit date: 10/15/2016     Years since quittin.5     Smokeless tobacco: Never Used   Substance Use Topics     Alcohol use: Yes     Alcohol/week: 0.0 standard drinks     Comment: Socailly      Wt Readings from Last 1 Encounters:   21 78.9 kg (173 lb 15.1 oz)        Anesthesia Evaluation   Pt has had prior anesthetic. Type: General.    No history of anesthetic complications       ROS/MED HX  ENT/Pulmonary:  - neg pulmonary ROS     Neurologic:  - neg neurologic ROS     Cardiovascular:     (+) hypertension-----    METS/Exercise Tolerance:     Hematologic:  - neg hematologic  ROS     Musculoskeletal: Comment: Lumbar pathology      GI/Hepatic:  - neg GI/hepatic ROS     Renal/Genitourinary:  - neg Renal ROS     Endo:  - neg endo ROS     Psychiatric/Substance Use: Comment: Smokes marijuana daily    (+) Recreational drug usage: Cannabis.    Infectious Disease:  - neg infectious disease ROS     Malignancy:  - neg malignancy ROS     Other:  - neg other ROS          Physical Exam    Airway        Mallampati: I   TM distance: > 3 FB      Respiratory Devices and Support         Dental       (+) lingual bar      Cardiovascular   cardiovascular exam normal          Pulmonary   pulmonary exam normal                OUTSIDE LABS:  CBC:   Lab Results   Component Value Date    HGB 15.0 2021    HGB 16.7 2021     BMP:   Lab  Results   Component Value Date     05/11/2021     04/07/2021    POTASSIUM 3.6 05/11/2021    POTASSIUM 3.6 04/07/2021    CHLORIDE 106 05/11/2021    CHLORIDE 106 04/07/2021    CO2 25 05/11/2021    CO2 22 04/07/2021    BUN 14 05/11/2021    BUN 14 04/07/2021    CR 0.86 05/11/2021    CR 0.88 04/07/2021     (H) 05/11/2021    GLC 91 04/07/2021     COAGS: No results found for: PTT, INR, FIBR  POC: No results found for: BGM, HCG, HCGS  HEPATIC: No results found for: ALBUMIN, PROTTOTAL, ALT, AST, GGT, ALKPHOS, BILITOTAL, BILIDIRECT, YADIRA  OTHER:   Lab Results   Component Value Date    SHANNON 8.9 05/11/2021       Anesthesia Plan    ASA Status:  2   NPO Status:  NPO Appropriate    Anesthesia Type: General.     - Airway: ETT   Induction: Intravenous, Propofol.   Maintenance: Balanced.        Consents    Anesthesia Plan(s) and associated risks, benefits, and realistic alternatives discussed. Questions answered and patient/representative(s) expressed understanding.     - Discussed with:  Patient         Postoperative Care    Pain management: IV analgesics, Oral pain medications, Multi-modal analgesia.   PONV prophylaxis: Ondansetron (or other 5HT-3), Dexamethasone or Solumedrol     Comments:                Jose Juarez MD

## 2021-05-19 NOTE — BRIEF OP NOTE
[facility]    Brief Operative Note    Pre-operative diagnosis: Herniation of lumbar intervertebral disc with radiculopathy [M51.16]  Post-operative diagnosis Same as pre-operative diagnosis    Procedure: Procedure(s):  Left Lumbar 5-Sacral 1 Minimally invasive microdiscectomy  Surgeon: Surgeon(s) and Role:     * Filipe Bone MD - Primary     * Jennifer Weston PA-C - Assisting     * Ubaldo Celaya PA-C - Assisting  Anesthesia: General   Estimated blood loss: 5ml    Drains: None  Specimens: * No specimens in log *  Findings:   None.  Complications: None.  Implants: * No implants in log *    [id number]

## 2021-05-19 NOTE — DISCHARGE INSTRUCTIONS
DR. JOSELUIS RYAN M.D.                    CLINIC PHONE NUMBER:  [phone]  [facility]    GENERAL ANESTHESIA OR SEDATION ADULT DISCHARGE INSTRUCTIONS   SPECIAL PRECAUTIONS FOR 24 HOURS AFTER SURGERY    IT IS NOT UNUSUAL TO FEEL LIGHT-HEADED OR FAINT, UP TO 24 HOURS AFTER SURGERY OR WHILE TAKING PAIN MEDICATION.  IF YOU HAVE THESE SYMPTOMS; SIT FOR A FEW MINUTES BEFORE STANDING AND HAVE SOMEONE ASSIST YOU WHEN YOU GET UP TO WALK OR USE THE BATHROOM.    YOU SHOULD REST AND RELAX FOR THE NEXT 24 HOURS AND YOU MUST MAKE ARRANGEMENTS TO HAVE SOMEONE STAY WITH YOU FOR AT LEAST 24 HOURS AFTER YOUR DISCHARGE.  AVOID HAZARDOUS AND STRENUOUS ACTIVITIES.  DO NOT MAKE IMPORTANT DECISIONS FOR 24 HOURS.    DO NOT DRIVE ANY VEHICLE OR OPERATE MECHANICAL EQUIPMENT FOR 24 HOURS FOLLOWING THE END OF YOUR SURGERY.  EVEN THOUGH YOU MAY FEEL NORMAL, YOUR REACTIONS MAY BE AFFECTED BY THE MEDICATION YOU HAVE RECEIVED.    DO NOT DRINK ALCOHOLIC BEVERAGES FOR 24 HOURS FOLLOWING YOUR SURGERY.    DRINK CLEAR LIQUIDS (APPLE JUICE, GINGER ALE, 7-UP, BROTH, ETC.).  PROGRESS TO YOUR REGULAR DIET AS YOU FEEL ABLE.    YOU MAY HAVE A DRY MOUTH, A SORE THROAT, MUSCLES ACHES OR TROUBLE SLEEPING.  THESE SHOULD GO AWAY AFTER 24 HOURS.    CALL YOUR DOCTOR FOR ANY OF THE FOLLOWING:  SIGNS OF INFECTION (FEVER, GROWING TENDERNESS AT THE SURGERY SITE, A LARGE AMOUNT OF DRAINAGE OR BLEEDING, SEVERE PAIN, FOUL-SMELLING DRAINAGE, REDNESS OR SWELLING.    IT HAS BEEN OVER 8 TO 10 HOURS SINCE SURGERY AND YOU ARE STILL NOT ABLE TO URINATE (PASS WATER).       Medications Received:    Maximum acetaminophen (Tylenol) dose from all sources should not exceed 4 grams (4000 mg) per day.   You received Tylenol 975mg at 12:15pm. Next dose of Tylenol after 6:15pm if needed.  Each Norco contains 325mg of Tylenol.      You received Ibuprofen 600mg at 4:20pm. Next dose of Ibuprofen after 10:20pm if needed.    You received Oxycodone 5mg at 4:20pm. Next dose  of [place] after 8:20pm if needed.

## 2021-05-20 ENCOUNTER — TELEPHONE (OUTPATIENT)
Dept: NEUROSURGERY | Facility: CLINIC | Age: 41
End: 2021-05-20

## 2021-05-20 LAB — INTERPRETATION ECG - MUSE: NORMAL

## 2021-05-20 NOTE — TELEPHONE ENCOUNTER
Post-Op Call    DOS: 5/19/21  Surgery: Left Lumbar 5-Sacral 1 Minimally invasive microdiscectomy   Surgeon: Dr. Bone     Called patient for post-operative follow-up.     Attempted to reach out to patient, no answer. Left voice message for patient to call clinic back to further discuss.

## 2021-05-25 DIAGNOSIS — M51.16 HERNIATION OF LUMBAR INTERVERTEBRAL DISC WITH RADICULOPATHY: ICD-10-CM

## 2021-05-25 DIAGNOSIS — M51.27 HERNIATION OF INTERVERTEBRAL DISC BETWEEN L5 AND S1: ICD-10-CM

## 2021-05-25 RX ORDER — METHOCARBAMOL 500 MG/1
500 TABLET, FILM COATED ORAL 4 TIMES DAILY PRN
Qty: 20 TABLET | Refills: 0 | Status: SHIPPED | OUTPATIENT
Start: 2021-05-25 | End: 2021-08-20

## 2021-05-25 RX ORDER — HYDROCODONE BITARTRATE AND ACETAMINOPHEN 5; 325 MG/1; MG/1
1 TABLET ORAL EVERY 4 HOURS PRN
Qty: 20 TABLET | Refills: 0 | Status: SHIPPED | OUTPATIENT
Start: 2021-05-25 | End: 2021-06-03

## 2021-05-25 NOTE — TELEPHONE ENCOUNTER
Patient calling for a refill of Norco & Robaxin.     DOS: 5/19/21  Procedure: Left L5-S1 minimally invasive microdiscectomy  Surgeon: Dr. Bone    Current symptom(s): Pain 5/10 L buttocks down leg to his calf described as a constant ache. Pain seems to be improving as he is able to sit and it's not unbearable with an improvement in posture when ambulate.     Pt aware to start weaning as tolerated.    Current pain management:   Norco 1 tab 3-4 times a day as needed  Robaxin TID as needed  Ibuprofen BID    Last fill: 5/19/21  Next visit: 6/3/21    Medication pended for your approval, if appropriate. Pharmacy verified.     Any patient questions or concerns: No    Informed patient request will be forwarded to care team.

## 2021-05-25 NOTE — TELEPHONE ENCOUNTER
Reason for call: pt called for refill for pain medication. Please call him back at [phone]. Thank you

## 2021-05-27 ENCOUNTER — TELEPHONE (OUTPATIENT)
Dept: NEUROSURGERY | Facility: CLINIC | Age: 41
End: 2021-05-27

## 2021-05-27 NOTE — TELEPHONE ENCOUNTER
Patient is waiting for a signed disability form so that he doesn't lose his benefits by June 1st.

## 2021-06-01 ENCOUNTER — DOCUMENTATION ONLY (OUTPATIENT)
Dept: NEUROSURGERY | Facility: CLINIC | Age: 41
End: 2021-06-01

## 2021-06-01 NOTE — PROGRESS NOTES
TYPE: LA    RECEIVED DATE: [unfilled]    FAX/MAIL/EMAIL TO: [facility] [phone]     ATTN:unknown    SCANNED TO MEDIA: Yes    Ayo Rushing, CMA

## 2021-06-03 ENCOUNTER — VIRTUAL VISIT (OUTPATIENT)
Dept: NEUROSURGERY | Facility: CLINIC | Age: 41
End: 2021-06-03
Payer: COMMERCIAL

## 2021-06-03 VITALS — BODY MASS INDEX: 26.22 KG/M2 | HEIGHT: 68 IN | WEIGHT: 173 LBS

## 2021-06-03 DIAGNOSIS — M51.27 HERNIATION OF INTERVERTEBRAL DISC BETWEEN L5 AND S1: ICD-10-CM

## 2021-06-03 DIAGNOSIS — Z98.890 S/P LUMBAR MICRODISCECTOMY: Primary | ICD-10-CM

## 2021-06-03 PROCEDURE — 999N000103 HC STATISTIC NO CHARGE FACILITY FEE: Mod: TEL

## 2021-06-03 PROCEDURE — 99207 PR NO CHARGE NURSE ONLY: CPT

## 2021-06-03 RX ORDER — HYDROCODONE BITARTRATE AND ACETAMINOPHEN 5; 325 MG/1; MG/1
1 TABLET ORAL EVERY 4 HOURS PRN
Qty: 20 TABLET | Refills: 0 | Status: SHIPPED | OUTPATIENT
Start: 2021-06-03 | End: 2021-08-20

## 2021-06-03 ASSESSMENT — MIFFLIN-ST. JEOR: SCORE: 1674.22

## 2021-06-03 ASSESSMENT — PAIN SCALES - GENERAL: PAINLEVEL: MODERATE PAIN (4)

## 2021-06-03 NOTE — PATIENT INSTRUCTIONS
Instructions for Patient    Keep your incision clean and dry at all times.     No bathing, swimming, or submerging in water until incision is well healed.      No lifting greater than 10-15 pounds. No bending, twisting, or overhead reaching.    Weaning from narcotic pain medications    When it is time, start weaning by extending the time between doses.     For example, if you're taking 2 tabs every 4 hours, spread it out to 2 tabs over 4.5, 5, 6 hours.     At that point you can certainly cut down to 1 tab, then wean to an as needed basis until completely done with them.    For refills, please call our clinic. A nurse will call you back to obtain a pain assessment. Please call 3-4 business days before you run out so we can ensure there is a provider available at the location you prefer for .    Call the clinic or go to Emergency Room if you develop any new pain, drainage, swelling, or fever. Go to the Emergency Room if sudden onset of severe headache, weakness, confusion, change in level of consciousness, pain, or loss of movement.    Post-operative appointments    6 week post op appointment on 6/30 at 1 pm at the [facility].     Please call clinic with any further questions or concerns    DENISE Roman  [facility]  [address] [address]  T:  [phone]  F:  [phone]

## 2021-06-03 NOTE — NURSING NOTE
Dalton Tillman is a 40 year old male who presents for:  Chief Complaint   Patient presents with     Surgical Followup     2 Wk F/U. DOS: 5/19/21.  Left Lumbar 5-Sacral 1 Minimally invasive microdiscectomy        Nursing Comments: Patient presents for phone visit 2 Wk F/U. DOS: 5/19/21.  Left Lumbar 5-Sacral 1 Minimally invasive microdiscectomy    Lakhwinder Leary MA

## 2021-06-03 NOTE — PROGRESS NOTES
Patient presents for 2 week incision nurse visit check.     DOS: 5/19/21  Procedure: Left L5-S1 minimally invasive microdiscectomy  Surgeon: Filipe Bone MD    From patients left buttock to ankle patient continues to have sciatic pain. States this pain becomes inflamed with position changes. Patient rates this pain at a 3-4/10. Patient states that he feels he has much improved since surgery. He continues to take Norco BID, Robaxin BID, Ibuprofen BID and tylenol at HS. He also uses ice.     Encouraged icing for at least 5-7 times throughout the day for 20-30 minutes at a time, avoiding heat to the incision area. Discussed maximum dose of Acetaminophen in 24 hours.     Patient is walking frequently without difficulty. Legs examined by patient at home; denies redness, swelling, or warmth noted. Patient denies any pain in bilateral calves. Activity restrictions reinforced at this time as outlined the After Visit Summary.     Patient's appetite is fair  Bowel/bladder problems? No  Taking stool softeners? No  Discussed reasons for constipation post-operatively and encouraged stool softeners while taking narcotic pain medication.      Patient denies signs of infection at incision site. Lumbar incision inspected. Edges well-approximated. No redness, swelling, drainage, or warmth noted. Aftercare instructions discussed with patient.     Refills given at this appointment? Yes. Needing refill of Norco.   Sent for x-rays after this appointment? No  Return to work discussed at this appointment? Yes. Patient states that he cannot return to work at this time as he has a physically demanding job that requires >70 lb lifting as well as repetitive bending/twisitng motions.     All of patient's questions addressed today. He was instructed to call with any additional questions/concerns.

## 2021-06-29 ENCOUNTER — OFFICE VISIT (OUTPATIENT)
Dept: NEUROSURGERY | Facility: CLINIC | Age: 41
End: 2021-06-29
Attending: PHYSICIAN ASSISTANT
Payer: COMMERCIAL

## 2021-06-29 VITALS — OXYGEN SATURATION: 100 % | DIASTOLIC BLOOD PRESSURE: 84 MMHG | SYSTOLIC BLOOD PRESSURE: 143 MMHG | HEART RATE: 98 BPM

## 2021-06-29 DIAGNOSIS — Z98.890 S/P LUMBAR MICRODISCECTOMY: Primary | ICD-10-CM

## 2021-06-29 PROCEDURE — 99024 POSTOP FOLLOW-UP VISIT: CPT | Performed by: PHYSICIAN ASSISTANT

## 2021-06-29 PROCEDURE — G0463 HOSPITAL OUTPT CLINIC VISIT: HCPCS

## 2021-06-29 NOTE — PROGRESS NOTES
Neurosurgery 6-week follow-up    Mr. Tillman is 6 weeks s/p left L5-S1 minimally invasive microdiscectomy.  Patient states his left leg symptoms have improved but have not yet resolved.  He continues have some pain going down his calf.  He denies any weakness in any numbness denies bowel or bladder symptoms.  Denies any issues with his incision.    Patient has a very demanding job as a  which involves climbing poles and lifting 70 pounds of equipment.  We will begin physical therapy at this time and extend his time off of work another 6 weeks.      Exam     Alert oriented no acute distress  Bilateral lower extremities appropriate strength  Incisions well-healed    Assessment    Status post lumbar microdiscectomy left L5-S1      Plan    Gradually increase activity as tolerated.   Physical therapy order placed  Remain off work for another 6 weeks for physical therapy   Follow-up in 6 weeks with Dr. Bone

## 2021-06-29 NOTE — NURSING NOTE
"June 29, 2021 10:50 AM   Dalton Tillman is a 40 year old male who presents for:    Chief Complaint   Patient presents with     Surgical Followup     6 wk     Initial Vitals: BP (!) 143/84   Pulse 98   SpO2 100%  Estimated body mass index is 26.3 kg/m  as calculated from the following:    Height as of 6/3/21: 5' 8\" (1.727 m).    Weight as of 6/3/21: 173 lb (78.5 kg). There is no height or weight on file to calculate BSA.  Data Unavailable Comment: Data Unavailable       Clinical concerns: ongoing pain in back and leg.  Daya Ross MA  "

## 2021-06-29 NOTE — LETTER
6/29/2021         RE: Dalton Tillman  [address] [facility] [address]        Dear Colleague,    Thank you for referring your patient, Dalton Tillman, to the [facility] [place]. Please see a copy of my visit note below.    Neurosurgery 6-week follow-up    Mr. Tillman is 6 weeks s/p left L5-S1 minimally invasive microdiscectomy.  Patient states his left leg symptoms have improved but have not yet resolved.  He continues have some pain in his bilateral wrist pain going down his calf.  He denies any weakness in any numbness denies bowel or bladder symptoms.  Denies any issues with his incision.    Patient is very demanding job as a  which involves climbing poles and lifting 70 pounds of equipment.  We will begin physical therapy at this time and extend his staff notes another 6 weeks.      Exam     Alert oriented no acute distress  Bilateral lower extremities appropriate strength  Incisions well-healed    Assessment    Status post lumbar microdiscectomy left L5-S1      Plan    Gradually increase activity as tolerated.   Physical therapy order placed  Remain off work for another 6 weeks for physical therapy   Follow-up in 6 weeks with Dr. Bone      Again, thank you for allowing me to participate in the care of your patient.        Sincerely,        Keon Avila PA-C

## 2021-07-02 ENCOUNTER — DOCUMENTATION ONLY (OUTPATIENT)
Dept: NEUROSURGERY | Facility: CLINIC | Age: 41
End: 2021-07-02

## 2021-07-06 ENCOUNTER — THERAPY VISIT (OUTPATIENT)
Dept: PHYSICAL THERAPY | Facility: CLINIC | Age: 41
End: 2021-07-06
Payer: COMMERCIAL

## 2021-07-06 DIAGNOSIS — Z98.890 S/P LUMBAR MICRODISCECTOMY: ICD-10-CM

## 2021-07-06 PROCEDURE — 97161 PT EVAL LOW COMPLEX 20 MIN: CPT | Mod: GP | Performed by: PHYSICAL THERAPIST

## 2021-07-06 PROCEDURE — 97110 THERAPEUTIC EXERCISES: CPT | Mod: GP | Performed by: PHYSICAL THERAPIST

## 2021-07-06 NOTE — PROGRESS NOTES
[facility] Physical Therapy Initial Evaluation  7/6/2021     Precautions/Restrictions/MD instructions: eval and treat    Therapist Assessment: Dalton Tillman is a 40 year old male patient presenting to Physical Therapy with L sided LBP after s/p L5-S1 lumbar microdiscectomy. Patient demonstrates decreased lumbar extension and flexion with peripheralizaton of symptoms during lumbar flexion into quad, full bilateral side glide, decreased activation of L hip flexion due to pain, painfree squat to 90 degree knee bend, + slump on L, + SLR on L . Signs and symptoms are consistent with L lumbar radiculopathy s/p L5-S1 lumbar microdiscectomy. These impairments limit their ability to ambulate, sit, stand for prolonged periods of time due to pain . Skilled PT services are necessary in order to reduce impairments and improve independent function.    Subjective History    Injury/Condition Details:  Presenting Complaint LBP (s/p L5-S1 lumbar microdiscectomy with some residual symptoms)     States the pain is completely gone. It is still constant at around 3-4/10. Says he is able to live with this pain.     Pre-cautions: No lifting greater than 10-15 pounds. No bending, twisting, or overhead reaching (cleared to resume some normal activities - pain as guide from Dr. Bone)    Onset Timing/Date 2/26/2021 problem began   DOS: 5/19/2021   (Doctor's referral 6/29/2021)   Mechanism Insidious onset - potentially repetitive lifting/climbing at work      Symptom Behavior Details    Primary Symptoms Constant symptoms; worsen with activity, pain (Location: L glute, quad - if in car for so long (some L calf pain), Quality: Sharp and Aching/Throbbing), stiffness, weakness       Denies locking, catching, giving way, or instability. Denies numbness, tingling, changes in sensation. Denies having related symptoms spreading to the L foot.    Worst Pain 8/10 (with prior to surgery)   Symptom Provocators Sitting, change of  position, being in a static position to long, driving     Before surgery: walking with cane    Best Pain 3-4/10    Symptom Relievers Laying flat on back, pain medication, ice    Time of day dependent? No   Recent symptom change? 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   Usual physical activities  (within past year) Running (marathon runner), golf    Orthopaedic History  2016 L wrist fracture - due to this does not do flat palm activities (wrist is bothered by pushing)    Medication  high blood pressure medication, pain medication   Notable medical history calf pain-swelling-warmth, history of fractures, high blood pressure, pain at night/rest   Patient goals Get back to running, get back to work, decrease pain if able    Patient Reported Health good     Red Flags: (Bold when present) - reviewed the following and denies  Cauda equina: progressive motor or sensory loss, urinary retention or overflow incontinence, new fecal incontinence, saddle anesthesia, significant motor deficits encompassing multiple nerve roots  Fracture: Significant trauma, prolonged corticosteroid use, osteoporosis, age >70  Infection: spinal procedure in the last 12 months, IV drug use, immunosuppression, fever, wound in spinal region, generally unwell  Malignancy: history of metastatic cancer, unexplained weight loss    Answers for HPI/ROS submitted by the patient on 7/1/2021   History Reported by Patient  Reason for Visit:: Had  microdiscectomy end of may and my surgeon ordered me to start physical therapy to aid recovery as I have a physically demanding job  When problem began:: 2/26/2021  How problem occurred:: 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 Didn't have a single event causing the injury that I am aware of.  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          PHYSICAL THERAPY SPINE EXAMINATION    Dynamic Movement Screen:  2 leg stance: decreased lumbar lordosis, forward shoulders, forward head, stands in slight lumbar flexion   2 leg squat:Excessive anterior knee excursion (reduced posterior hip excursion)  Spine rotation in stance: poor segmental dissociation noted with rotation bilaterally     1 leg stance:   Right: normal, no pain  Left: normal, no pain    Gait: slow santa, slightly favoring L leg with shortened step length on R, increased stance time on R    Lumbar & Thoracic Spine ROM Screen   RIGHT LEFT   Standing Lumbar Spine ROM   Flexion Mod loss + pain into L quad   Extension Mod loss + pain into L glute   Sideglide Nil loss  Nil loss + pain at end range    Seated Thoracic Spine ROM   Rotation Min loss  Min loss        Passive Joint Mobility Screen: NT due to pain with rotation in bed     Tender to palpation at the following structures:  bilateral QL lumbar, L glute med/max   NOT tender to palpation at the following structures: R glute med/max     Motor Deficit:  Myotomes L R   L1-2 (hip flexion) 3+/5 Patient stopped due to pain 5/5   L3 (knee extension) 5/5 5/5   L4 (ankle DF) 5/5 5/5   L5 (g. toe ext) 5/5 5/5   S1 (ankle PF or knee flex) 5/5 5/5   (* = patient s pain)  Sensory Exam: SILT and symmetrical for bilateral LE's. Reflexes 2+ and symmetrical for bilateral patellar and Achilles.     Lower Extremity Muscle Strength (x/5)   Left Right   Hip Flex 3+/5* 4+/5   Hip ER 4-/5 4/5   Hip IR 4/5 4/5   Hip ABD 4-/5 4-/5   Hip ADD 4/5 4/5   Hip Ext At least 3+/5    performed squat 3+/5performed squat   Knee Flex 4+/5 4+/5       Lower Extremity Neural System Examination   Right Left   NEURAL TENSION     Seated Slump Test - +++   Supine SLR Test (Sciatic n.) - +++   Prone KF (Femoral n.) - -     Lower Extremity Flexibility Screen:   Right Left   Hamstring ++ +++   NAVA + ++   Goalie Stretch - -   Hip Flexor - -   ITB/Lat Hip in SL - -   Quadriceps - -   Gastroc/ Soleus - -   - = normal  + = mild tightness  ++ = moderate tightness  +++ = significant tightness    ASSESSMENT/PLAN:  The patient is a 40 year old male with chief complaint of L sided LBP.    The patient has the following significant findings with corresponding treatment plan.  Diagnosis 1:  S/p lumbar microdiscectomy with residual lumbar radiculopathy    Pain -  manual therapy, self management, education, directional preference exercise and home program  Decreased ROM/flexibility - manual therapy, therapeutic exercise and home program  Decreased joint mobility - manual therapy, therapeutic exercise and home program  Decreased strength - therapeutic exercise, therapeutic activities and home program  Impaired balance - neuro re-education, therapeutic activities and home program  Decreased proprioception - neuro re-education and therapeutic activities  Impaired gait - gait training and assistive  devices  Impaired muscle performance - neuro re-education and home program  Decreased function - therapeutic activities and home program  Impaired posture - neuro re-education, therapeutic activities and home program  Instability -  Therapeutic Activity, Therapeutic Exercise, Neuromuscular Re-education, Splinting/Taping/Bracing/Orthotic, home program    Therapy Evaluation Codes:   1) History comprised of:   Personal factors that impact the plan of care:      None.    Comorbidity factors that impact the plan of care are:      High blood pressure, Pain at night/rest and calf pain, history of fractures .     Medications impacting care: High blood pressure and Pain.  2) Examination of Body Systems comprised of:   Body structures and functions that impact the plan of care:      Lumbar spine.   Activity limitations that impact the plan of care are:      Bending, Driving, Lifting, Sitting, Stairs and Walking.  3) Clinical presentation characteristics are:   Stable/Uncomplicated.  4) Decision-Making    Low complexity using standardized patient assessment instrument and/or measureable assessment of functional outcome.  Cumulative Therapy Evaluation is: Low complexity.    Previous and current functional limitations:  (See Goal Flow Sheet for this information)    Short term and Long term goals: (See Goal Flow Sheet for this information)     Communication ability:  Patient appears to be able to clearly communicate and understand verbal and written communication and follow directions correctly.  Treatment Explanation - The following has been discussed with the patient:   RX ordered/plan of care  Anticipated outcomes  Possible risks and side effects  This patient would benefit from PT intervention to resume normal activities.   Rehab potential is good.    Frequency:  1 X week, once daily  Duration:  for 8 visits  Discharge Plan: Achieve all LTGs, be independent in home treatment program, and reach maximal therapeutic  benefit.    Please refer to the daily flowsheet for treatment today, total treatment time and time spent performing 1:1 timed codes.

## 2021-07-16 ENCOUNTER — THERAPY VISIT (OUTPATIENT)
Dept: PHYSICAL THERAPY | Facility: CLINIC | Age: 41
End: 2021-07-16
Payer: COMMERCIAL

## 2021-07-16 DIAGNOSIS — Z98.890 S/P LUMBAR MICRODISCECTOMY: ICD-10-CM

## 2021-07-16 PROCEDURE — 97110 THERAPEUTIC EXERCISES: CPT | Mod: GP | Performed by: PHYSICAL THERAPIST

## 2021-07-16 PROCEDURE — 97140 MANUAL THERAPY 1/> REGIONS: CPT | Mod: GP | Performed by: PHYSICAL THERAPIST

## 2021-07-21 ENCOUNTER — DOCUMENTATION ONLY (OUTPATIENT)
Dept: NEUROSURGERY | Facility: CLINIC | Age: 41
End: 2021-07-21

## 2021-07-22 ENCOUNTER — THERAPY VISIT (OUTPATIENT)
Dept: PHYSICAL THERAPY | Facility: CLINIC | Age: 41
End: 2021-07-22
Payer: COMMERCIAL

## 2021-07-22 DIAGNOSIS — Z98.890 S/P LUMBAR MICRODISCECTOMY: ICD-10-CM

## 2021-07-22 PROCEDURE — 97110 THERAPEUTIC EXERCISES: CPT | Mod: GP | Performed by: PHYSICAL THERAPIST

## 2021-07-22 PROCEDURE — 97530 THERAPEUTIC ACTIVITIES: CPT | Mod: GP | Performed by: PHYSICAL THERAPIST

## 2021-07-29 ENCOUNTER — THERAPY VISIT (OUTPATIENT)
Dept: PHYSICAL THERAPY | Facility: CLINIC | Age: 41
End: 2021-07-29
Payer: COMMERCIAL

## 2021-07-29 DIAGNOSIS — Z98.890 S/P LUMBAR MICRODISCECTOMY: ICD-10-CM

## 2021-07-29 PROCEDURE — 97110 THERAPEUTIC EXERCISES: CPT | Mod: GP | Performed by: PHYSICAL THERAPIST

## 2021-07-29 PROCEDURE — 97530 THERAPEUTIC ACTIVITIES: CPT | Mod: GP | Performed by: PHYSICAL THERAPIST

## 2021-07-29 NOTE — PROGRESS NOTES
Subjective:  HPI  Physical Exam                    Objective:  System    Physical Exam    General     ROS    Assessment/Plan:    SUBJECTIVE  Subjective changes as noted by pt:   Pt. cont to increase his walking distance and feels he is gaining strength.   Current pain level:  2/10   Changes in function:  Yes (See Goal flowsheet attached for changes in current functional level)     Adverse reaction to treatment or activity:  None    OBJECTIVE  Changes in objective findings:  Strength L DF 4+/5; PF 4/5; hip abd 4/5; ext 4/5.     ASSESSMENT  Dalton continues to require intervention to meet STG and LTG's: PT  Patient's symptoms are resolving.  Response to therapy has shown an improvement in  pain level, strength and function  Progress made towards STG/LTG?  Yes (See Goal flowsheet attached for updates on achievement of STG and LTG)    PLAN  Current treatment program is being advanced to more complex exercises.    PTA/ATC plan:  N/A    Please refer to the daily flowsheet for treatment today, total treatment time and time spent performing 1:1 timed codes.

## 2021-08-05 ENCOUNTER — THERAPY VISIT (OUTPATIENT)
Dept: PHYSICAL THERAPY | Facility: CLINIC | Age: 41
End: 2021-08-05
Payer: COMMERCIAL

## 2021-08-05 DIAGNOSIS — Z98.890 S/P LUMBAR MICRODISCECTOMY: ICD-10-CM

## 2021-08-05 PROCEDURE — 97530 THERAPEUTIC ACTIVITIES: CPT | Mod: GP | Performed by: PHYSICAL THERAPIST

## 2021-08-05 PROCEDURE — 97110 THERAPEUTIC EXERCISES: CPT | Mod: GP | Performed by: PHYSICAL THERAPIST

## 2021-08-20 ENCOUNTER — OFFICE VISIT (OUTPATIENT)
Dept: NEUROSURGERY | Facility: CLINIC | Age: 41
End: 2021-08-20
Attending: NEUROLOGICAL SURGERY
Payer: COMMERCIAL

## 2021-08-20 VITALS
OXYGEN SATURATION: 98 % | SYSTOLIC BLOOD PRESSURE: 142 MMHG | HEART RATE: 94 BPM | WEIGHT: 186 LBS | DIASTOLIC BLOOD PRESSURE: 97 MMHG | BODY MASS INDEX: 28.28 KG/M2

## 2021-08-20 DIAGNOSIS — Z98.890 S/P LUMBAR MICRODISCECTOMY: Primary | ICD-10-CM

## 2021-08-20 PROCEDURE — G0463 HOSPITAL OUTPT CLINIC VISIT: HCPCS

## 2021-08-20 PROCEDURE — 99024 POSTOP FOLLOW-UP VISIT: CPT | Performed by: NURSE PRACTITIONER

## 2021-08-20 ASSESSMENT — PAIN SCALES - GENERAL: PAINLEVEL: MILD PAIN (2)

## 2021-08-20 NOTE — LETTER
8/20/2021         RE: Dalton Tillman  [address]  [place] [address]        Dear Colleague,    Thank you for referring your patient, Dalton Tillman, to the [facility] [place]. Please see a copy of my visit note below.    [facility]  Neurosurgery Follow Up:    HPI: 3 months s/p left L5-S1 microdiscectomy with Dr. Bone. Left leg symptoms much improved since surgery. Some intermittent symptoms with prolonged immobility. No paresthesias or weakness. 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 He has begun PT to work on increasing activities. He is interested in a couple additional weeks to fully increase activities to return to work without restrictions. Incision intact.    Medical, surgical, family, and social history unchanged since prior exam.  Exam:  Constitutional:  Alert, well nourished, NAD.  HEENT: Normocephalic, atraumatic.   Pulm:  Without shortness of breath   CV:  No pitting edema of BLE.      Vital Signs:  BP (!) 142/97   Pulse 94   Wt 186 lb (84.4 kg)   SpO2 98%   BMI 28.28 kg/m      Neurological:  Awake  Alert  Oriented x 3  Motor exam:        IP Q DF PF EHL  R   5  5   5   5    5  L   5  5   5   5    5     Able to spontaneously move L/E bilaterally  Sensation intact throughout all L/E dermatomes     Incisions:  Healing nicely.    A/P: s/p lumbar microdiscectomy. Will write letter for additional time off to increase activity at home to simulate work. Will return to work at that time without restrictions. He will contact us to address return to work. He verbalized understanding and agreement.    Patient Instructions   - May increase activity as tolerated.   - Follow up as needed.   - Call the clinic at [phone] for increased pain or any other questions and concerns.          Audrey Francis, CNP  [facility]  [address] [place] [address]  [address] [place] [address]  [place] [id number]  Tel [phone]  Fax [phone]        Again, thank you for allowing me to participate in the care of your patient.        Sincerely,        Audrey Francis NP

## 2021-08-20 NOTE — NURSING NOTE
"Dalton Tillman is a 40 year old male who presents for:  Chief Complaint   Patient presents with     Surgical Followup     6 mo. f/u DOS: 5/19/2021        Initial Vitals:  BP (!) 142/97   Pulse 94   Wt 186 lb (84.4 kg)   SpO2 98%   BMI 28.28 kg/m   Estimated body mass index is 28.28 kg/m  as calculated from the following:    Height as of 6/3/21: 5' 8\" (1.727 m).    Weight as of this encounter: 186 lb (84.4 kg).. Body surface area is 2.01 meters squared. BP completed using cuff size: regular  Mild Pain (2)    Austin Boothe MA    "

## 2021-08-20 NOTE — PROGRESS NOTES
[facility]  Neurosurgery Follow Up:    HPI: 3 months s/p left L5-S1 microdiscectomy with Dr. Bone. Left leg symptoms much improved since surgery. Some intermittent symptoms with prolonged immobility. No paresthesias or weakness. 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 He has begun PT to work on increasing activities. He is interested in a couple additional weeks to fully increase activities to return to work without restrictions. Incision intact.    Medical, surgical, family, and social history unchanged since prior exam.  Exam:  Constitutional:  Alert, well nourished, NAD.  HEENT: Normocephalic, atraumatic.   Pulm:  Without shortness of breath   CV:  No pitting edema of BLE.      Vital Signs:  BP (!) 142/97   Pulse 94   Wt 186 lb (84.4 kg)   SpO2 98%   BMI 28.28 kg/m      Neurological:  Awake  Alert  Oriented x 3  Motor exam:        IP Q DF PF EHL  R   5  5   5   5    5  L   5  5   5   5    5     Able to spontaneously move L/E bilaterally  Sensation intact throughout all L/E dermatomes     Incisions:  Healing nicely.    A/P: s/p lumbar microdiscectomy. Will write letter for additional time off to increase activity at home to simulate work. Will return to work at that time without restrictions. He will contact us to address return to work. He verbalized understanding and agreement.    Patient Instructions   - May increase activity as tolerated.   - Follow up as needed.   - Call the clinic at [phone] for increased pain or any other questions and concerns.          Audrey Francis, MARIA E  [facility]  [address] [place] [id number]  Tel [phone]  Fax [phone]

## 2021-08-20 NOTE — PATIENT INSTRUCTIONS
- May increase activity as tolerated.   - Follow up as needed.   - Call the clinic at [phone] for increased pain or any other questions and concerns.

## 2021-08-20 NOTE — LETTER
[facility]  Neurosurgery Clinic  [address] [place] [address]  [id number]         August 20, 2021    To Whom it May Concern,      Dalton Tillman is being seen at our clinic for post-operative follow up care. He is to remain off work for 3 additional weeks to increase activities at home with the goal of returning to work without restrictions. We will re-evaluate at that time. Please call our clinic with questions or concerns.       Sincerely,            Audrey Francis CNP  [facility]  Neurosurgery Clinic  [address] [place] [address]  [phone]  [phone]

## 2021-08-23 ENCOUNTER — TELEPHONE (OUTPATIENT)
Dept: NEUROSURGERY | Facility: CLINIC | Age: 41
End: 2021-08-23

## 2021-08-23 NOTE — TELEPHONE ENCOUNTER
Spoke with [employer] friday afternoon 8/20/21 and they will be sending over the updated PW from them that our clinic needs to fill out. They were also requesting OV notes from recent visit.Care Coordinator is watching for the incoming PW and will update RNs once received.        Faxed Office visit note from 8/20/21 and letter to [employer] per their request. August 23, 2021 to fax number [phone]    Claim#: [id number]    Right Fax confirmed at 0854 AM    Bethany Mullins RN

## 2021-09-07 ENCOUNTER — TELEPHONE (OUTPATIENT)
Dept: NEUROSURGERY | Facility: CLINIC | Age: 41
End: 2021-09-07

## 2021-09-07 NOTE — TELEPHONE ENCOUNTER
Reason for call: Pt called to have pain assessment for his return to work. Please call him back at [phone]. Thank you

## 2021-09-07 NOTE — TELEPHONE ENCOUNTER
Patient would like a RTW form filled out for work. Reviewed last office visit. Per plan, patient has been increasing activity. He is doing well. He has some discomfort during activity which stops after activity. He currently works four, 10 hour days. He says he would maybe like to try 8 hour days with a RTW date of 9/12.

## 2021-09-08 NOTE — TELEPHONE ENCOUNTER
Called the Pt. And asked him how long he wants the 8 hr restriction to last? Pt. Requested 2 weeks 8hrs per days. Pt. Will then return to work with 10hrs per days.     [employer] return to work form completed. Return to work 9/12-9/26/21. Restricted to work 8 hr work days. After 9/26; return to work with no restrictions.     Completed Disability Leave Provider Statement to the best of my ability. Place forms in Dr. Bone in basket to be signed.

## 2021-09-14 ENCOUNTER — MYC MEDICAL ADVICE (OUTPATIENT)
Dept: NEUROSURGERY | Facility: CLINIC | Age: 41
End: 2021-09-14

## 2021-09-15 ENCOUNTER — DOCUMENTATION ONLY (OUTPATIENT)
Dept: NEUROSURGERY | Facility: CLINIC | Age: 41
End: 2021-09-15

## 2021-09-20 ENCOUNTER — VIRTUAL VISIT (OUTPATIENT)
Dept: FAMILY MEDICINE | Facility: CLINIC | Age: 41
End: 2021-09-20
Payer: COMMERCIAL

## 2021-09-20 DIAGNOSIS — H00.015 HORDEOLUM EXTERNUM OF LEFT LOWER EYELID: Primary | ICD-10-CM

## 2021-09-20 PROCEDURE — 99213 OFFICE O/P EST LOW 20 MIN: CPT | Mod: 95 | Performed by: FAMILY MEDICINE

## 2021-09-20 RX ORDER — POLYMYXIN B SULFATE AND TRIMETHOPRIM 1; 10000 MG/ML; [USP'U]/ML
1-2 SOLUTION OPHTHALMIC EVERY 4 HOURS
Qty: 10 ML | Refills: 0 | Status: SHIPPED | OUTPATIENT
Start: 2021-09-20 | End: 2022-05-26

## 2021-09-20 NOTE — PROGRESS NOTES
Dalton is a 40 year old who is being evaluated via a billable video visit.      How would you like to obtain your AVS? Johnnie  If the video visit is dropped, the invitation should be resent by: Johnnie or else Text to cell phone: [phone]  Will anyone else be joining your video visit? No      Video Start Time: 5:30    Assessment & Plan     Hordeolum externum of left lower eyelid  He has a large hard ileum externa in the left lower eyelid that has not healed in the past 7 weeks despite oral and topical antibiotics.  He was given a referral to ophthalmology and a prescription for Polytrim eyedrops to see if this helps.  He may continue warm compresses.  - Adult Eye Referral; Future  - trimethoprim-polymyxin b (POLYTRIM) 87771-5.1 UNIT/ML-% ophthalmic solution; Place 1-2 drops into both eyes every 4 hours                Return in about 4 weeks (around 10/18/2021) for Follow up if symptoms not improving..    Kp Duenas, [facility]   Dalton is a 40 year old who presents for the following health issues     HPI     ED/UC Followup:    Facility:  [facility]  Date of visit: 8/15/21  Reason for visit: left lower eyelid stye  Current Status: pt finished keflex and erythromycin but reports no improvement, irritation comes and goes, needs ophthalmology referral           He has a large stye on his left lower eyelid that has been present since early August.  He went to an urgent care and was diagnosed with hordeolum externa and was prescribed oral cephalexin and topical erythromycin.  He has also been using warm compresses.  Unfortunately, the symptoms have not significantly improved.    Review of Systems   Constitutional, HEENT, cardiovascular, pulmonary, gi and gu systems are negative, except as otherwise noted.      Objective    Vitals - Patient Reported  Systolic (Patient Reported): (!) 153  Diastolic (Patient Reported): (!) 96  Temperature  (Patient Reported): 98  F (36.7  C)      Vitals:  No vitals were obtained today due to virtual visit.    Physical Exam   GENERAL: Healthy, alert and no distress  EYES: There is a large, open stye in the left lower eyelid.  No discharge or erythema, or obvious scleral/conjunctival abnormalities.  RESP: No audible wheeze, cough, or visible cyanosis.  No visible retractions or increased work of breathing.    SKIN: Visible skin clear. No significant rash, abnormal pigmentation or lesions.  NEURO: Cranial nerves grossly intact.  Mentation and speech appropriate for age.  PSYCH: Mentation appears normal, affect normal/bright, judgement and insight intact, normal speech and appearance well-groomed.                Video-Visit Details    Type of service:  Video Visit    Video End Time:5: 38    Originating Location (pt. Location): Home    Distant Location (provider location):  [facility]     Platform used for Video Visit: Sunlot

## 2021-09-22 NOTE — TELEPHONE ENCOUNTER
FUTURE VISIT INFORMATION      FUTURE VISIT INFORMATION:    Date: 10/15/21    Time: 9:30am    Location: [facility] [place]  REFERRAL INFORMATION:    Referring provider:  Kp Hardin    Referring providers clinic:  [facility]    Reason for visit/diagnosis  Hordeolum externum of left lower eyelid.    RECORDS REQUESTED FROM:       Clinic name Comments Records Status Imaging Status   [facility] 9/20/21 EPIC

## 2021-10-02 ENCOUNTER — HEALTH MAINTENANCE LETTER (OUTPATIENT)
Age: 41
End: 2021-10-02

## 2021-10-15 ENCOUNTER — OFFICE VISIT (OUTPATIENT)
Dept: OPHTHALMOLOGY | Facility: CLINIC | Age: 41
End: 2021-10-15
Payer: COMMERCIAL

## 2021-10-15 ENCOUNTER — PRE VISIT (OUTPATIENT)
Dept: OPHTHALMOLOGY | Facility: CLINIC | Age: 41
End: 2021-10-15

## 2021-10-15 DIAGNOSIS — H00.015 HORDEOLUM EXTERNUM OF LEFT LOWER EYELID: Primary | ICD-10-CM

## 2021-10-15 DIAGNOSIS — H01.015 ULCERATIVE BLEPHARITIS OF LEFT LOWER EYELID: ICD-10-CM

## 2021-10-15 PROCEDURE — 67800 REMOVE EYELID LESION: CPT | Mod: E2 | Performed by: OPHTHALMOLOGY

## 2021-10-15 RX ORDER — ERYTHROMYCIN 5 MG/G
OINTMENT OPHTHALMIC ONCE
Status: COMPLETED | OUTPATIENT
Start: 2021-10-15 | End: 2021-10-15

## 2021-10-15 RX ADMIN — ERYTHROMYCIN: 5 OINTMENT OPHTHALMIC at 10:21

## 2021-10-15 ASSESSMENT — EXTERNAL EXAM - RIGHT EYE: OD_EXAM: NORMAL

## 2021-10-15 ASSESSMENT — CONF VISUAL FIELD
OS_NORMAL: 1
METHOD: COUNTING FINGERS
OD_NORMAL: 1

## 2021-10-15 ASSESSMENT — SLIT LAMP EXAM - LIDS: COMMENTS: NORMAL

## 2021-10-15 ASSESSMENT — TONOMETRY
OD_IOP_MMHG: 20
OS_IOP_MMHG: 20
IOP_METHOD: ICARE

## 2021-10-15 ASSESSMENT — VISUAL ACUITY
OD_SC: 20/15
METHOD: SNELLEN - LINEAR
OS_SC: 20/15

## 2021-10-15 ASSESSMENT — EXTERNAL EXAM - LEFT EYE: OS_EXAM: NORMAL

## 2021-10-15 NOTE — PROGRESS NOTES
Chief Complaint(s) and History of Present Illness(es)     Hordeolum Evaluation     Laterality: left lower lid    Onset: 2 months ago    Course: gradually worsening    Associated symptoms: lid swelling, mattering, lid redness and discharge.    Negative for lid pain, tearing and eye pain    Treatments tried: ointment, warm compresses, antibiotic drops and oral   allergy medications    Pain scale: 0/10              Comments     New pt here today for LE lower lid hordeolum evaluation.  Pt states bump has been in place for about 2 months.   Was seen by urgent care in [facility] back in August for lesion.  Pt was prescribed drops & pills that had no improvement on bump.    Ocular meds = warm compresses and gel drops    Raymond QUINTERO, ANNI 9:25 AM 10/15/2021         Using Polytrim TID in left eye and warm compresses TID. He thinks it may have gotten slightly smaller but otherwise has been stable. Denies pain      Assessment & Plan     Dalton Tillman is a 40 year old male with the following diagnoses:   1. Hordeolum externum of left lower eyelid    2. Ulcerative blepharitis of left lower eyelid       Little improvement with warm compresses and Polytrim TID  I and D Chalazion left lower lid            Ki Aguilar MD  Ophthalmology Resident- PGY-4    Attending Physician Attestation:  I have seen and examined this patient with the resident .  I have confirmed and edited as necessary the chief complaint(s), history of present illness, review of systems, relevant history, and examination findings as documented by others.  I have personally reviewed the relevant tests, images, and reports as documented above.  I have confirmed and edited as necessary the assessment and plan and agree with this note.    - Merritt Castro MD 10:19 AM 10/15/2021    Today with Dalton Tillman, I reviewed the indications, risks, benefits, and alternatives of the proposed surgical procedure including, but not limited to,  failure obtain the desired result  and need for additional surgery, bleeding, infection, loss of vision, loss of the eye, and the remote possibility of permanent damage to any organ system or death with the use of anesthesia.  I provided multiple opportunities for the questions, answered all questions to the best of my ability, and confirmed that my answers and my discussion were understood.     - Merritt Castro MD 10:19 AM 10/15/2021

## 2021-10-15 NOTE — PATIENT INSTRUCTIONS
Merritt Castro M.D.    POST OPERATIVE INSTRUCTIONS   OFFICE EYELID SURGERY      1. Ice pack immediately after surgery. Alternate ten minutes on and ten minutes off for the first 24 - 48 hours, while in a reclined position with two pillows under your head while awake.     2. You can use Tylenol extra strength for pain as directed on the bottle.     3. Sleep with two pillows under your head for the first night following surgery.     4.  If bandaged, remove the dressing 24 hours after surgery.     5. Use ointment along the incision both morning and evening until your return visit. If you get ointment in your eye, it will blur your vision slightly.     6. Avoid aspirin or anti-inflammatory medications such as Advil or Motrin for one week following your surgery unless otherwise directed.     7. Avoid strenuous activity or straining for the first week after surgery.     8. Bathing and showers are OK but to facilitate healing, do not wash or apply water to the sutured areas.     9. Small amounts of bright red blood normally stain the bandages. Some blurring of vision can be expected due to drainage and ointment in the eyes.     10. If your eyes swell shut, you cannot establish vision in the operated eye, or the pain is not reasonably controlled with medication you must contact the eye clinic immediately.     11. If you should have a problem after normal office hours, you can reach the ophthalmologist on call at [phone]. If for some reason no one can be reached, proceed to the nearest emergency room for evaluation and treatment.

## 2021-10-15 NOTE — NURSING NOTE
Chief Complaints and History of Present Illnesses   Patient presents with     Hordeolum Evaluation     Chief Complaint(s) and History of Present Illness(es)     Hordeolum Evaluation     Laterality: left lower lid    Onset: 2 months ago    Course: gradually worsening    Associated symptoms: lid swelling, mattering, lid redness and discharge.  Negative for lid pain, tearing and eye pain    Treatments tried: ointment, warm compresses, antibiotic drops and oral allergy medications    Pain scale: 0/10              Comments     New pt here today for LE lower lid hordeolum evaluation.  Pt states bump has been in place for about 2 months.   Was seen by urgent care in [facility] back in August for lesion.  Pt was prescribed drops & pills that had no improvement on bump.    Ocular meds = warm compresses and gel drops    Raymond Hidalgo COA, ANNI 9:25 AM 10/15/2021

## 2021-10-25 ENCOUNTER — LAB (OUTPATIENT)
Dept: URGENT CARE | Facility: URGENT CARE | Age: 41
End: 2021-10-25
Attending: PHYSICIAN ASSISTANT
Payer: COMMERCIAL

## 2021-10-25 ENCOUNTER — E-VISIT (OUTPATIENT)
Dept: URGENT CARE | Facility: CLINIC | Age: 41
End: 2021-10-25
Payer: COMMERCIAL

## 2021-10-25 DIAGNOSIS — Z20.822 SUSPECTED COVID-19 VIRUS INFECTION: Primary | ICD-10-CM

## 2021-10-25 DIAGNOSIS — Z20.822 SUSPECTED COVID-19 VIRUS INFECTION: ICD-10-CM

## 2021-10-25 PROCEDURE — U0005 INFEC AGEN DETEC AMPLI PROBE: HCPCS

## 2021-10-25 PROCEDURE — 99421 OL DIG E/M SVC 5-10 MIN: CPT | Performed by: PHYSICIAN ASSISTANT

## 2021-10-25 PROCEDURE — U0003 INFECTIOUS AGENT DETECTION BY NUCLEIC ACID (DNA OR RNA); SEVERE ACUTE RESPIRATORY SYNDROME CORONAVIRUS 2 (SARS-COV-2) (CORONAVIRUS DISEASE [COVID-19]), AMPLIFIED PROBE TECHNIQUE, MAKING USE OF HIGH THROUGHPUT TECHNOLOGIES AS DESCRIBED BY CMS-2020-01-R: HCPCS

## 2021-10-25 NOTE — PATIENT INSTRUCTIONS
Dear Dalton Tillman,    Your symptoms show that you may have coronavirus (COVID-19). This illness can cause fever, cough and trouble breathing. Many people get a mild case and get better on their own. Some people can get very sick.    Will I be tested for COVID-19?  We would like to test you for Covid-19 virus. I have placed orders for this test.     To schedule: go to your [employer] home page and scroll down to the section that says  You have an appointment that needs to be scheduled  and click the large green button that says  Schedule Now  and follow the steps to find the next available openings.    If you are unable to complete these [employer] scheduling steps, please call [phone] to schedule your testing.     Return to work/school/ guidance:  Please let your workplace manager and staffing office know when your quarantine ends     We can t give you an exact date as it depends on the above. You can calculate this on your own or work with your manager/staffing office to calculate this. (For example if you were exposed on 10/4, you would have to quarantine for 14 full days. That would be through 10/18. You could return on 10/19.)      If you receive a positive COVID-19 test result, follow the guidance of the those who are giving you the results. Usually the return to work is 10 (or in some cases 20 days from symptom onset.) If you work at [facility], you must also be cleared by Employee Occupational Health and Safety to return to work.        If you receive a negative COVID-19 test result and did not have a high risk exposure to someone with a known positive COVID-19 test, you can return to work once you're free of fever for 24 hours without fever-reducing medication and your symptoms are improving or resolved.      If you receive a negative COVID-19 test and If you had a high risk exposure to someone who has tested positive for COVID-19 then you can return to work 14 days after your last contact  with the positive individual    Note: If you have ongoing exposure to the covid positive person, this quarantine period may be more than 14 days. (For example, if you are continued to be exposed to your child who tested positive and cannot isolate from them, then the quarantine of 7-14 days can't start until your child is no longer contagious. This is typically 10 days from onset of the child's symptoms. So the total duration may be 17-24 days in this case.)    Sign up for [facility].   We know it's scary to hear that you might have COVID-19. We want to track your symptoms to make sure you're okay over the next 2 weeks. Please look for an email from [facility]--this is a free, online program that we'll use to keep in touch. To sign up, follow the link in the email you will receive. Learn more at [email or web address]    How can I take care of myself?    Get lots of rest. Drink extra fluids (unless a doctor has told you not to)    Take Tylenol (acetaminophen) or ibuprofen for fever or pain. If you have liver or kidney problems, ask your family doctor if it's okay to take Tylenol o ibuprofen    If you have other health problems (like cancer, heart failure, an organ transplant or severe kidney disease): Call your specialty clinic if you don't feel better in the next 2 days.    Know when to call 911. Emergency warning signs include:  o Trouble breathing or shortness of breath  o Pain or pressure in the chest that doesn't go away  o Feeling confused like you haven't felt before, or not being able to wake up  o Bluish-colored lips or face    Where can I get more information?  [facility] [employer] [place] - About COVID-19:   [email or web address]    CDC - What to Do If You're Sick:   www.cdc.gov/coronavirus/2019-ncov/about/steps-when-sick.html    October 25, 2021  RE:  Dalton Tillman                                                                                                                  [id number]  [facility] [address]      To whom it may concern:    I evaluated Dalton Tillman on October 25, 2021. Dalton Tillman should be excused from work/school.     They should let their workplace manager and staffing office know when their quarantine ends.    We can not give an exact date as it depends on the information below. They can calculate this on their own or work with their manager/staffing office to calculate this. (For example if they were exposed on 10/04, they would have to quarantine for 14 full days. That would be through 10/18. They could return on 10/19.)    Quarantine Guidelines:      If patient receives a positive COVID-19 test result, they should follow the guidance of those who are giving the results. Usually the return to work is 10 (or in some cases 20 days from symptom onset.) If they work at [employer], they must be cleared by Employee Occupational Health and Safety to return to work.        If patient receives a negative COVID-19 test result and did not have a high risk exposure to someone with a known positive COVID-19 test, they can return to work once they're free of fever for 24 hours without fever-reducing medication and their symptoms are improving or resolved.      If patient receives a negative COVID-19 test and if they had a high risk exposure to someone who has tested positive for COVID-19 then they can return to work 14 days after their last contact with the positive individual    Note: If there is ongoing exposure to the covid positive person, this quarantine period may be longer than 14 days. (For example, if they are continually exposed to their child, who tested positive and cannot isolate from them, then the quarantine of 7-14 days can't start until their child is no longer contagious. This is typically 10 days from onset to the child's symptoms. So the total duration may be 17-24 days in this case.)     Sincerely,  Opal Mata PA-C

## 2021-10-26 LAB — SARS-COV-2 RNA RESP QL NAA+PROBE: NEGATIVE

## 2021-11-19 PROBLEM — Z98.890 S/P LUMBAR MICRODISCECTOMY: Status: RESOLVED | Noted: 2021-07-06 | Resolved: 2021-11-19

## 2021-11-20 NOTE — PROGRESS NOTES
DISCHARGE REPORT    Dalton did not return for further treatment after the last visit on 8/5/21.   Current status is unknown.  Please see information below for last known relevant information.  Patient seen for 5 visits.    SUBJECTIVE  Subjective changes noted by patient:  Pt. cont to c/o mild low back and L LE discomfort but states he is significantly better than before surgery. He also feels he is making steady progress with strength and mobility.   .  Current pain level is 1/10.     Previous pain level was   .   Changes in function: (See Goal flowsheet attached for changes in current functional level)  Adverse reaction to treatment or activity: None    OBJECTIVE  Changes noted in objective findings:   Strength L DF 4+/5; PF 4/5; hip abd 4+/5; ext 4/5; lower abdominals 4/5     ASSESSMENT/PLAN  Diagnosis: s/p lumbar microdiscectomy    Updated problem list and treatment plan:  Patient will continue to utilize HEP for any remaining deficits.   STG/LTGs have been met or progress has been made towards goals:  Please see goal flowsheet for most current information  Assessment of Progress: current status is unknown.    Last current status:     Self Management Plans:  HEP  I have re-evaluated this patient and find that the nature, scope, duration and intensity of the therapy is appropriate for the medical condition of the patient.  Dalton continues to require the following intervention to meet STG and LTG's:  HEP.    Recommendations:  Discharge with current home program.  Patient to follow up with MD as needed.    Please refer to the daily flowsheet for treatment today, total treatment time and time spent performing 1:1 timed codes.

## 2021-12-02 ENCOUNTER — MYC MEDICAL ADVICE (OUTPATIENT)
Dept: FAMILY MEDICINE | Facility: CLINIC | Age: 41
End: 2021-12-02
Payer: COMMERCIAL

## 2022-01-11 ENCOUNTER — E-VISIT (OUTPATIENT)
Dept: URGENT CARE | Facility: CLINIC | Age: 42
End: 2022-01-11
Payer: COMMERCIAL

## 2022-01-11 DIAGNOSIS — Z20.822 CLOSE EXPOSURE TO 2019 NOVEL CORONAVIRUS: Primary | ICD-10-CM

## 2022-01-11 PROCEDURE — 99421 OL DIG E/M SVC 5-10 MIN: CPT | Performed by: EMERGENCY MEDICINE

## 2022-01-11 NOTE — PATIENT INSTRUCTIONS
Dear Dalton,    Based on your exposure to COVID-19 (coronavirus), we would like to test you for this virus. I have placed an order for this test. The best time for testing is 5-7 days after the exposure.    How to schedule:  Go to your [employer] home page and scroll down to the section that says  You have an appointment that needs to be scheduled  and click the large green button that says  Schedule Now  and follow the steps to find the next available opening.     If you are unable to complete these [employer] scheduling steps, please call [phone] to schedule your testing.     Monoclonal antibody treatment after exposure:  Because you have been exposed to COVID-19, you may be able to receive a treatment with monoclonal antibodies. This treatment can lower your risk of severe illness and going to the hospital. It is given through an IV or under your skin (subcutaneous) and must be given at an infusion center.   To be eligible, you must be 12 years or older, at least 88 pounds and:    Are not fully vaccinated against COVID-19, OR    Are immunocompromised     If you would like to sign up to be considered to receive the monoclonal antibody medicine, please complete a participation form through the [facility] here:  MNRAP ([email or web address]). You may also call the [facility] COVID-19 Public Hotline at 1-450.595.5365 (open Mon-Fri: 9am-7pm and Sat: 10am-6pm).     Not all people who are eligible will receive the medicine since supply is limited. You will be contacted in the next 1 to 2 business days only if you are selected. If you do not receive a call, you have not been selected to receive the medicine. If you have any questions about this medication, please contact your primary care provider. For more information, see [email or web address]    Return to work/school/ guidance:   Please let your workplace manager and  staffing office know when your quarantine ends. We cannot give you an exact date as it depends on the information below. You can calculate this on your own or work with your manager/staffing office to calculate this.    Quarantine Guidelines:  You are considered exposed if you have been within 6 feet of an infected person(s) for 15 minutes or more over a 24-hour period. Quarantine will start after the LAST time you had contact with the infected person while they were contagious (for example, if you saw someone on Monday and Wednesday, your quarantine would start after Wednesday).     If you have NO symptoms (asymptomatic):    Stay home for 14 days (quarantine) after your LAST contact with a person who has COVID-19 (this remains the CDC recommendation for greatest protection against spread of COVID-19), OR    10-day quarantine with no test, OR    Minimum 7-day quarantine with negative RT-PCR test collected on day 5 or later    Quarantine Guideline exceptions:    People who have been fully vaccinated do not need to quarantine unless they have symptoms. You are considered fully vaccinated 2 weeks after your 2nd dose in a 2-dose series or 2 weeks after a single-dose series. This includes vaccinated health care workers.  o Fully vaccinated people should still get tested 5-7 days after exposure, even if they don t have symptoms.   Note: If you have ongoing exposure to the COVID-positive person, this quarantine period may be more than 14 days. For example, if you continue to be exposed to your child who tested positive and cannot isolate from them, then the quarantine of 7-14 days can't start until your child is no longer contagious. This is typically 10 days from onset of the child's symptoms. So, the total duration may be 17-24 days in this case.   Please contact your doctor if you have questions or call the [facility] Public Hotline: 1-836.956.1781 (Mon-Fri: 9am-7pm and Sat: 10am-6pm).     How to Quarantine:     Monitor your  symptoms until 14 days after your last exposure. If you develop signs or symptoms, isolate and get tested (even if you have been tested already).    Stay home and away from others. Don't go to school or anywhere else. Generally, quarantine means staying home from work but there are some exceptions to this. Please contact your workplace. Cover your mouth and nose with a face covering if you must be around other people.     Wash your hands and face often. Use soap and water.    What are the symptoms of COVID-19?  The most common symptoms are cough, fever and trouble breathing. Less common symptoms include headache, body aches, fatigue (feeling very tired), chills, sore throat, stuffy or runny nose, diarrhea (loose poop), loss of taste or smell, belly pain, and nausea or vomiting (feeling sick to your stomach or throwing up).  If you develop symptoms, follow these guidelines:    If you're normally healthy: Please start another eVisit.    If you have a serious health problem (like cancer, heart failure, an organ transplant or kidney disease): Call your specialty clinic. Let them know that you might have COVID-19.    Where can I get more information?    [facility] [place] - About COVID-19: [email or web address]     CDC - What to Do If You're Sick:     www.cdc.gov/coronavirus/2019-ncov/about/steps-when-sick.html    CDC - Ending Home Isolation:  https://www.cdc.gov/coronavirus/2019-ncov/your-health/quarantine-isolation.html    CDC - Caring for Someone:  www.cdc.gov/coronavirus/2019-ncov/if-you-are-sick/care-for-someone.html    [facility] clinical trials (COVID-19 research studies): clinicalaffairs.[email or web address]/umn-[email or web address]-trials    Below are the COVID-19 hotlines at the [facility] of Health ([facility]). Interpreters are available.  o For health questions: Call 885-424-7266 or 1-387.304.8891 (7 am to 7 pm)  o For questions about schools and childcare: Call 998-894-4668 or 1-391.244.1694 (7 a.m. to 7  "p.m.)    January 11, 2022  RE:  Dalton Tillman                                                                                                                   [address]  [place] [address]      To whom it may concern:    I evaluated Dalton Tillman on January 11, 2022. Dalton Tillman should be excused from work/school.    They should let their workplace manager and staffing office know when their quarantine ends.    We can not give an exact date as it depends on the information below. They can calculate this on their own or work with their manager/staffing office to calculate this. (For example if they were exposed on 10/04, they would have to quarantine for 14 full days. That would be through 10/18. They could return on 10/19.)    Quarantine Guidelines:    Patients (\"contacts\") who have been in close prolonged contact of an infected person(s) (within six feet for at least 15 minutes within a 24 hour period) and remain asymptomatic should enter quarantine based on the following options:      14-day quarantine period (this remains the CDC recommendation for the greatest protection against spread of COVID-19) OR    Minimum 7-day quarantine with negative RT-PCR test collected on day 5 or later OR    10-day quarantine with no test   Quarantine Guideline exceptions are as follows:    People who have been fully vaccinated do not need to quarantine if the exposure was at least 2 weeks after the last vaccination. This includes vaccinated health care workers.    Not fully vaccinated and unvaccinated Individuals who work in health care, congregate care, or congregate living should be off work for 14 days from their last date of exposure. Community activities for this group can be resumed based on options above. Fully vaccinated individuals in this group do not need to quarantine from work after exposure.    Not fully vaccinated and unvaccinated people whose high-risk exposure was a household member should always " quarantine for 14 days from their last date of exposure. Fully vaccinated people in this category do not need to quarantine.    Not fully vaccinated or unvaccinated residents of congregate care and congregate living settings should always quarantine for 14 days from their last date of exposure. Fully vaccinated residents do not need to quarantine.    Note: If there is ongoing exposure to the covid positive person, this quarantine period may be longer than 14 days. (For example, if they are continually exposed to their child, who tested positive and cannot isolate from them, then the quarantine of 7-14 days can't start until their child is no longer contagious. This is typically 10 days from onset to the child's symptoms. So the total duration may be 17-24 days in this case.)    Dalton FREY Primo should continue symptom monitoring until day 14 post-exposure. If they develop signs or symptoms of COVID-19, they should isolate and get tested (even if they have been tested already).    Sincerely,  Celio Colón MD

## 2022-01-13 ENCOUNTER — LAB (OUTPATIENT)
Dept: LAB | Facility: CLINIC | Age: 42
End: 2022-01-13
Attending: EMERGENCY MEDICINE
Payer: COMMERCIAL

## 2022-01-13 DIAGNOSIS — Z20.822 CLOSE EXPOSURE TO 2019 NOVEL CORONAVIRUS: ICD-10-CM

## 2022-01-13 PROCEDURE — 99000 SPECIMEN HANDLING OFFICE-LAB: CPT

## 2022-01-13 PROCEDURE — U0005 INFEC AGEN DETEC AMPLI PROBE: HCPCS | Mod: 90

## 2022-01-13 PROCEDURE — U0003 INFECTIOUS AGENT DETECTION BY NUCLEIC ACID (DNA OR RNA); SEVERE ACUTE RESPIRATORY SYNDROME CORONAVIRUS 2 (SARS-COV-2) (CORONAVIRUS DISEASE [COVID-19]), AMPLIFIED PROBE TECHNIQUE, MAKING USE OF HIGH THROUGHPUT TECHNOLOGIES AS DESCRIBED BY CMS-2020-01-R: HCPCS | Mod: 90

## 2022-01-14 LAB
SARS-COV-2 RNA RESP QL NAA+PROBE: NORMAL
SARS-COV-2 RNA RESP QL NAA+PROBE: NOT DETECTED

## 2022-01-22 ENCOUNTER — HEALTH MAINTENANCE LETTER (OUTPATIENT)
Age: 42
End: 2022-01-22

## 2022-05-25 NOTE — PROGRESS NOTES
"  Assessment & Plan     (Z98.890) S/P lumbar microdiscectomy  (primary encounter diagnosis)  Comment: May 2021-  Plan: Physical Therapy Referral      (M54.42,  G89.29) Chronic left-sided low back pain with left-sided sciatica  Plan: Conservative management.  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  Warm pack as needed   Start PHYSICAL THERAPY   Physical Therapy Referral,  Over the counter NSAID only with meals  naproxen (NAPROSYN)       500 MG tablet,  twice daily as needed  pain  gabapentin (NEURONTIN) 100 MG      capsule bedtime to help over months with radiculopathy pain.  Follow up with orthopedic / spine surgeon clinic / Dr Bone.    If problems with any medications- stop and follow up at uptown with any avaliable provider    Hypertension- ocntrolled  - considered this problem when making today's plans  - no interventions today       -followed by  Primary care physicain       Prescription drug management      Return in about 4 weeks (around 6/23/2022) for follow up with PCP, concerns,unresolved.    Loretta Long MD  [facility]      44 minutes spent on the date of the encounter doing chart review, history and exam, documentation and further activities per the note       BMI:   Estimated body mass index is 26.93 kg/m  as calculated from the following:    Height as of 6/3/21: 1.727 m (5' 8\").    Weight as of this encounter: 80.3 kg (177 lb 2 oz).   Weight management plan: Discussed healthy diet and exercise guidelines      Keisha Hoffman is a 41 year old who presents for the following health issues   S/P    Left L5-S1 minimally invasive microdiscectomy-May  19/2021 by Dr Bone.  Did PHYSICAL THERAPY for months   Was off work till august 2021.  .    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      And now over time  For past 1-2 " months,radiating pain  , from lower back, down to the left buttock and left leg- beyond the left knee.  Without saddle numbness, without bowel  Or bladder changes.    Pin does repsond to 2 tabs of over the counter ibuprofen but does not want to mask the pain- and worried if needs another surgery- and would like to avoid that at all costs, if possible.      Has long shift, 10-11 hrs each, 4 days a week.By the end of the shift having  recurrenece of back/sciatic pain that has caused him  to call off from work a couple times in the last month.  Here to discuss other modalities to  Prevent flare up of the back pain .    Pain ranges from  Dull ache to sharp shooting  Pain quinton if sitting & may vary from mild to moderate  Standing feels better,Rest in bed helps but sometimes waking up in am with moderate to sever pain- and hobbling to the bathroom  Because of  bilateral  back pain and radiating to left hip.     Thursday light day and recover over the weekend.      History of Present Illness       Back Pain:  He presents for follow up of back pain. Patient's back pain is a recurring problem.  Location of back pain:  Right lower back, left lower back, right buttock and left buttock  Description of back pain: dull ache and gnawing  Back pain spreads: left buttocks and left thigh    Since patient first noticed back pain, pain is: always present, but gets better and worse  Does back pain interfere with his job:  Yes      He eats 2-3 servings of fruits and vegetables daily.He consumes 1 sweetened beverage(s) daily.He exercises with enough effort to increase his heart rate 30 to 60 minutes per day.  He exercises with enough effort to increase his heart rate 3 or less days per week. He is missing 3 dose(s) of medications per week.       Pain History:  When did you first notice your pain? - Acute Pain   Have you seen anyone else for your pain? Yes - neurosurgeon  Where in your body do you have pain? Back Pain  Onset/Duration:  months   Description:   Location of pain: low back bilateral  Character of pain: sharp, dull ache and gnawing  Pain radiation: radiates into the left buttocks and radiates into the left leg  New numbness or weakness in legs, not attributed to pain: no   Intensity: Currently varies 0-8/10, moderate  Progression of Symptoms: same  History:   Specific cause: lifting, walking, running, work-related  Pain interferes with job: YES  History of back problems: recurrent self limited episodes of low back pain in the past  Any previous MRI or X-rays: Yes- at [place].  Date march 2021  Sees a specialist for back pain: Yes, Dr. Bone, no contact or appointment made at this time  Alleviating factors:   Improved by: NSAIDs, standing and stretch    Precipitating factors:  Worsened by: Lifting and work related  Therapies tried and outcome: no long lasting benefit    Accompanying Signs & Symptoms:  Risk of Fracture: None  Risk of Cauda Equina: None  Risk of Infection: None  Risk of Cancer: None  Risk of Ankylosing Spondylitis: Onset at age <35, male, AND morning back stiffness  no     Review of Systems   Constitutional, HEENT, cardiovascular, pulmonary, GI, , musculoskeletal, neuro, skin, endocrine and psych systems are negative, except as otherwise noted.      Objective    /77   Pulse 62   Temp 97.8  F (36.6  C) (Oral)   Wt 80.3 kg (177 lb 2 oz)   SpO2 98%   BMI 26.93 kg/m    Body mass index is 26.93 kg/m .  Physical Exam   GENERAL: healthy, alert and no distress  RESP: lungs clear to auscultation - no rales, rhonchi or wheezes  CV: regular rate and rhythm, normal S1 S2, no S3 or S4, no murmur, click or rub, no peripheral edema and peripheral pulses strong  NEURO: Normal strength and tone, mentation intact and speech normal  Comprehensive back pain exam:  Tenderness of paraspinal, Range of motion not limited by pain, Lower extremity strength functional and equal on both sides, Lower extremity sensation normal and equal  on both sides and Straight leg positive on  left, indicating possible ipsilateral radiculopathy

## 2022-05-26 ENCOUNTER — OFFICE VISIT (OUTPATIENT)
Dept: FAMILY MEDICINE | Facility: CLINIC | Age: 42
End: 2022-05-26
Payer: COMMERCIAL

## 2022-05-26 VITALS
OXYGEN SATURATION: 98 % | DIASTOLIC BLOOD PRESSURE: 77 MMHG | HEART RATE: 62 BPM | WEIGHT: 177.13 LBS | SYSTOLIC BLOOD PRESSURE: 129 MMHG | BODY MASS INDEX: 26.93 KG/M2 | TEMPERATURE: 97.8 F

## 2022-05-26 DIAGNOSIS — G89.29 CHRONIC LEFT-SIDED LOW BACK PAIN WITH LEFT-SIDED SCIATICA: ICD-10-CM

## 2022-05-26 DIAGNOSIS — M54.42 CHRONIC LEFT-SIDED LOW BACK PAIN WITH LEFT-SIDED SCIATICA: ICD-10-CM

## 2022-05-26 DIAGNOSIS — I10 ESSENTIAL HYPERTENSION, BENIGN: ICD-10-CM

## 2022-05-26 DIAGNOSIS — Z98.890 S/P LUMBAR MICRODISCECTOMY: Primary | ICD-10-CM

## 2022-05-26 PROCEDURE — 99215 OFFICE O/P EST HI 40 MIN: CPT | Performed by: FAMILY MEDICINE

## 2022-05-26 RX ORDER — NAPROXEN 500 MG/1
500 TABLET ORAL 2 TIMES DAILY WITH MEALS
Qty: 60 TABLET | Refills: 3 | Status: SHIPPED | OUTPATIENT
Start: 2022-05-26 | End: 2023-05-25

## 2022-05-26 RX ORDER — GABAPENTIN 100 MG/1
100 CAPSULE ORAL AT BEDTIME
Qty: 30 CAPSULE | Refills: 3 | Status: SHIPPED | OUTPATIENT
Start: 2022-05-26 | End: 2022-07-07

## 2022-05-26 NOTE — PATIENT INSTRUCTIONS
(Z98.890) S/P lumbar microdiscectomy  (primary encounter diagnosis)  Comment: May 2021-  Plan: Physical Therapy Referral      (M54.42,  G89.29) Chronic left-sided low back pain with left-sided sciatica  Plan: Conservative management.  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  Warm pack as needed   Start PHYSICAL THERAPY   Physical Therapy Referral,  Over the counter NSAID only with meals  naproxen (NAPROSYN)       500 MG tablet,  twice daily as needed  pain  gabapentin (NEURONTIN) 100 MG      capsule bedtime to help over months with radiculopathy pain.  Follow up with orthopedic / spine surgeon clinic / Dr Bone.    If problems with any medications- stop and follow up at uptown with any avaliable provider  Otherwise follow up with primary care physicain in 1 month.

## 2022-06-23 ENCOUNTER — THERAPY VISIT (OUTPATIENT)
Dept: PHYSICAL THERAPY | Facility: CLINIC | Age: 42
End: 2022-06-23
Attending: FAMILY MEDICINE
Payer: COMMERCIAL

## 2022-06-23 DIAGNOSIS — M54.17 LUMBOSACRAL RADICULOPATHY AT S1: Primary | ICD-10-CM

## 2022-06-23 DIAGNOSIS — G89.29 CHRONIC LEFT-SIDED LOW BACK PAIN WITH LEFT-SIDED SCIATICA: ICD-10-CM

## 2022-06-23 DIAGNOSIS — M54.42 CHRONIC LEFT-SIDED LOW BACK PAIN WITH LEFT-SIDED SCIATICA: ICD-10-CM

## 2022-06-23 DIAGNOSIS — Z98.890 S/P LUMBAR MICRODISCECTOMY: ICD-10-CM

## 2022-06-23 PROCEDURE — 97110 THERAPEUTIC EXERCISES: CPT | Mod: GP | Performed by: PHYSICAL THERAPIST

## 2022-06-23 PROCEDURE — 97161 PT EVAL LOW COMPLEX 20 MIN: CPT | Mod: GP | Performed by: PHYSICAL THERAPIST

## 2022-06-23 PROCEDURE — 97530 THERAPEUTIC ACTIVITIES: CPT | Mod: GP | Performed by: PHYSICAL THERAPIST

## 2022-06-23 NOTE — PROGRESS NOTES
Physical Therapy Initial Evaluation  Subjective:    Patient Health History  Dalton Tillman being seen for Sciatic pain starting to return (had surgery May 2021). Strenuous activity worsens pain..     Problem began: 2/17/2021.   Problem occurred: Herniated L5 S1, not sure of the specific cause   Pain is reported as 2/10 on pain scale.  General health as reported by patient is good.       Medical allergies: none.   Surgeries include:  Orthopedic surgery.    Current medications:  Anti-inflammatory, high blood pressure medication and pain medication.    Current occupation is  ([employer]).   Primary job tasks include:  Computer work, driving, lifting/carrying, prolonged sitting, prolonged standing and repetitive tasks.                Pt post surg for 1 month or two.  Went back to work with no restriction.    Golfing-did not return to.  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    Also a runner, ran a 10 mile.  Pain at end of run.  Normal running is treadmill running 2-5 miles 3 times a week.  One long run 6-10 miles.  Goal run TCM in October.  Has run 8-9 in the past.  Ultimately do ultra distance.  Works at [email or web address] with 70 pounds  Pain comes 1/2 way into shift.  Meds have helped. Next MD 7th  Normally works 4 days a week.  No current strength  Oswestry Score: (P) 8 %    Driving a car is aggravating, left glut and goes into left post hamstring and calf  Has weights, dumbbells                 Objective:  System  Trunk AROM  Flex: 75% and stiff  Ext: 100% and no sxs  No change with repeated FIS and EIS  TTP: unremarkable  Hamstring mild deficits LATONIA  SLR negative LATONIA  Cayetano negative LATONIA   TA: fair control  LE myotomes 5/5 LATONIA all levels    Physical Exam    General     ROS    Assessment/Plan:    Patient is a 41 year old male with lumbar complaints.    Patient has the following significant findings with corresponding treatment plan.                Diagnosis  1:  Lumbar pain  Pain -  hot/cold therapy, manual therapy, self management, education and home program  Decreased ROM/flexibility - manual therapy and therapeutic exercise  Decreased strength - therapeutic exercise and therapeutic activities  Decreased function - therapeutic activities    Therapy Evaluation Codes:   1) History comprised of:   Personal factors that impact the plan of care:      None.    Comorbidity factors that impact the plan of care are:      None.     Medications impacting care: None.  2) Examination of Body Systems comprised of:   Body structures and functions that impact the plan of care:      Lumbar spine.   Activity limitations that impact the plan of care are:      Bending, Driving, Reading/Computer work, Sitting, Sports, Squatting/kneeling, Standing and Walking.  3) Clinical presentation characteristics are:   Stable/Uncomplicated.  4) Decision-Making    Low complexity using standardized patient assessment instrument and/or measureable assessment of functional outcome.  Cumulative Therapy Evaluation is: Low complexity.    Previous and current functional limitations:  (See Goal Flow Sheet for this information)    Short term and Long term goals: (See Goal Flow Sheet for this information)     Communication ability:  Patient appears to be able to clearly communicate and understand verbal and written communication and follow directions correctly.  Treatment Explanation - The following has been discussed with the patient:   RX ordered/plan of care  Anticipated outcomes  Possible risks and side effects  This patient would benefit from PT intervention to resume normal activities.   Rehab potential is good.    Frequency:  1 X week, once daily  Duration:  for 8 weeks  Discharge Plan:  Achieve all LTG.  Independent in home treatment program.  Reach maximal therapeutic benefit.    Please refer to the daily flowsheet for treatment today, total treatment time and time spent performing 1:1 timed codes.

## 2022-07-02 NOTE — PROGRESS NOTES
"  Assessment & Plan     Chronic left-sided low back pain with left-sided sciatica  I recommended he continue physical therapy and keep up with the exercises at home.  He was given a refill of gabapentin and he has naproxen available when needed.  - gabapentin (NEURONTIN) 100 MG capsule; Take 1 capsule (100 mg) by mouth At Bedtime    S/P lumbar microdiscectomy  Stable since surgery on 5/19/2021.    Essential hypertension, benign  Blood pressure is well controlled on lisinopril 20 mg daily.  He was given a refill and we are checking a BMP.  - Basic metabolic panel  (Ca, Cl, CO2, Creat, Gluc, K, Na, BUN); Future  - lisinopril (ZESTRIL) 20 MG tablet; Take 1 tablet (20 mg) by mouth daily  - Basic metabolic panel  (Ca, Cl, CO2, Creat, Gluc, K, Na, BUN)    Screening for HIV (human immunodeficiency virus)  - HIV Antigen Antibody Combo; Future  - HIV Antigen Antibody Combo    Need for hepatitis C screening test  - Hepatitis C Screen Reflex to HCV RNA Quant and Genotype; Future  - Hepatitis C Screen Reflex to HCV RNA Quant and Genotype    Screening for hyperlipidemia  - Lipid panel reflex to direct LDL Fasting; Future  - Lipid panel reflex to direct LDL Fasting             BMI:   Estimated body mass index is 26.93 kg/m  as calculated from the following:    Height as of this encounter: 1.727 m (5' 8\").    Weight as of this encounter: 80.3 kg (177 lb 1.6 oz).   Weight management plan: Discussed healthy diet and exercise guidelines        No follow-ups on file.    Kp Duenas [facility]    Keisha Hoffman is a 41 year old, presenting for the following health issues:  Back Pain      History of Present Illness       Back Pain:  He presents for follow up of back pain. Patient's back pain is a chronic problem.  Location of back pain:  Left lower back, left buttock and left hip  Description of back pain: dull ache  Back pain spreads: left buttocks and left thigh    Since patient first noticed " back pain, pain is: always present, but gets better and worse  Does back pain interfere with his job:  Yes      He eats 2-3 servings of fruits and vegetables daily.He consumes 1 sweetened beverage(s) daily.He exercises with enough effort to increase his heart rate 9 or less minutes per day.  He exercises with enough effort to increase his heart rate 3 or less days per week.   He is taking medications regularly.    Today's PHQ-9         PHQ-9 Total Score: 0    PHQ-9 Q9 Thoughts of better off dead/self-harm past 2 weeks :   Not at all      Today's DEISI-7 Score: 0       Pain History:  When did you first notice your pain? - Chronic Pain since he injured his back on 2/26/2021.  The MRI from 3/10/2021 showed a left disc herniation at L5-S1 displacing the left S1 nerve root.  He eventually underwent an L5-S1 minimally invasive microdiscectomy on 5/19/2021.  Since that time he has been working on physical therapy and was recently started on gabapentin 100 mg at bedtime.  He feels like this medication has been helpful.  He occasionally takes naproxen as well.  His work is quite labor-intensive and requires some lifting which can flare symptoms from time to time.    Overall, he feels like the back pain symptoms are stable at this time.  He is back at work and functioning fairly well.  He also has a history of hypertension and takes lisinopril 20 mg daily.  He is tolerating this well.  He is not complaining of headaches or blurry vision.    Have you seen this provider for your pain in the past?   Yes   Where in your body do you have pain? Back pain  Are you seeing anyone else for your pain? No        PHQ-9 SCORE 7/6/2022   PHQ-9 Total Score MyChart 0   PHQ-9 Total Score 0     DEISI-7 SCORE 7/6/2022   Total Score 0 (minimal anxiety)   Total Score 0     PEG Score 7/7/2022   PEG Total Score 1       Chronic Pain Follow Up:      PDMP Review     None        Last CSA Agreement:   CSA -- Patient Level:    CSA: None found at the patient  "level.         Review of Systems   Constitutional, HEENT, cardiovascular, pulmonary, GI, , musculoskeletal, neuro, skin, endocrine and psych systems are negative, except as otherwise noted.      Objective    /79   Pulse 70   Temp 98.6  F (37  C)   Resp 16   Ht 1.727 m (5' 8\")   Wt 80.3 kg (177 lb 1.6 oz)   SpO2 98%   BMI 26.93 kg/m    Body mass index is 26.93 kg/m .  Physical Exam   GENERAL: healthy, alert and no distress  EYES: Eyes grossly normal to inspection, PERRL and conjunctivae and sclerae normal  HENT: ear canals and TM's normal, nose and mouth without ulcers or lesions  NECK: no adenopathy, no asymmetry, masses, or scars and thyroid normal to palpation  RESP: lungs clear to auscultation - no rales, rhonchi or wheezes  CV: regular rate and rhythm, normal S1 S2, no S3 or S4, no murmur, click or rub, no peripheral edema and peripheral pulses strong  ABDOMEN: soft, nontender, no hepatosplenomegaly, no masses and bowel sounds normal  MS: no gross musculoskeletal defects noted, no edema.  Straight leg raise normal bilaterally.  Internal and external hip rotation normal bilaterally.  SKIN: no suspicious lesions or rashes  NEURO: Normal strength and tone, mentation intact and speech normal.  Deep tendon reflexes 2 out of 4 bilaterally.  PSYCH: mentation appears normal, affect normal/bright                    .  ..  "

## 2022-07-06 ASSESSMENT — PATIENT HEALTH QUESTIONNAIRE - PHQ9
SUM OF ALL RESPONSES TO PHQ QUESTIONS 1-9: 0
SUM OF ALL RESPONSES TO PHQ QUESTIONS 1-9: 0

## 2022-07-06 ASSESSMENT — ANXIETY QUESTIONNAIRES
2. NOT BEING ABLE TO STOP OR CONTROL WORRYING: NOT AT ALL
GAD7 TOTAL SCORE: 0
4. TROUBLE RELAXING: NOT AT ALL
GAD7 TOTAL SCORE: 0
GAD7 TOTAL SCORE: 0
7. FEELING AFRAID AS IF SOMETHING AWFUL MIGHT HAPPEN: NOT AT ALL
7. FEELING AFRAID AS IF SOMETHING AWFUL MIGHT HAPPEN: NOT AT ALL
3. WORRYING TOO MUCH ABOUT DIFFERENT THINGS: NOT AT ALL
6. BECOMING EASILY ANNOYED OR IRRITABLE: NOT AT ALL
5. BEING SO RESTLESS THAT IT IS HARD TO SIT STILL: NOT AT ALL
1. FEELING NERVOUS, ANXIOUS, OR ON EDGE: NOT AT ALL

## 2022-07-07 ENCOUNTER — OFFICE VISIT (OUTPATIENT)
Dept: FAMILY MEDICINE | Facility: CLINIC | Age: 42
End: 2022-07-07
Payer: COMMERCIAL

## 2022-07-07 VITALS
SYSTOLIC BLOOD PRESSURE: 125 MMHG | RESPIRATION RATE: 16 BRPM | WEIGHT: 177.1 LBS | DIASTOLIC BLOOD PRESSURE: 79 MMHG | OXYGEN SATURATION: 98 % | HEIGHT: 68 IN | BODY MASS INDEX: 26.84 KG/M2 | TEMPERATURE: 98.6 F | HEART RATE: 70 BPM

## 2022-07-07 DIAGNOSIS — Z11.59 NEED FOR HEPATITIS C SCREENING TEST: ICD-10-CM

## 2022-07-07 DIAGNOSIS — G89.29 CHRONIC LEFT-SIDED LOW BACK PAIN WITH LEFT-SIDED SCIATICA: Primary | ICD-10-CM

## 2022-07-07 DIAGNOSIS — I10 ESSENTIAL HYPERTENSION, BENIGN: ICD-10-CM

## 2022-07-07 DIAGNOSIS — Z13.220 SCREENING FOR HYPERLIPIDEMIA: ICD-10-CM

## 2022-07-07 DIAGNOSIS — Z98.890 S/P LUMBAR MICRODISCECTOMY: ICD-10-CM

## 2022-07-07 DIAGNOSIS — Z11.4 SCREENING FOR HIV (HUMAN IMMUNODEFICIENCY VIRUS): ICD-10-CM

## 2022-07-07 DIAGNOSIS — M54.42 CHRONIC LEFT-SIDED LOW BACK PAIN WITH LEFT-SIDED SCIATICA: Primary | ICD-10-CM

## 2022-07-07 LAB
ANION GAP SERPL CALCULATED.3IONS-SCNC: 6 MMOL/L (ref 3–14)
BUN SERPL-MCNC: 14 MG/DL (ref 7–30)
CALCIUM SERPL-MCNC: 9.4 MG/DL (ref 8.5–10.1)
CHLORIDE BLD-SCNC: 109 MMOL/L (ref 94–109)
CHOLEST SERPL-MCNC: 203 MG/DL
CO2 SERPL-SCNC: 23 MMOL/L (ref 20–32)
CREAT SERPL-MCNC: 0.81 MG/DL (ref 0.66–1.25)
FASTING STATUS PATIENT QL REPORTED: YES
GFR SERPL CREATININE-BSD FRML MDRD: >90 ML/MIN/1.73M2
GLUCOSE BLD-MCNC: 96 MG/DL (ref 70–99)
HCV AB SERPL QL IA: NONREACTIVE
HDLC SERPL-MCNC: 67 MG/DL
HIV 1+2 AB+HIV1 P24 AG SERPL QL IA: NONREACTIVE
LDLC SERPL CALC-MCNC: 121 MG/DL
NONHDLC SERPL-MCNC: 136 MG/DL
POTASSIUM BLD-SCNC: 3.9 MMOL/L (ref 3.4–5.3)
SODIUM SERPL-SCNC: 138 MMOL/L (ref 133–144)
TRIGL SERPL-MCNC: 73 MG/DL

## 2022-07-07 PROCEDURE — 80048 BASIC METABOLIC PNL TOTAL CA: CPT | Performed by: FAMILY MEDICINE

## 2022-07-07 PROCEDURE — 87389 HIV-1 AG W/HIV-1&-2 AB AG IA: CPT | Performed by: FAMILY MEDICINE

## 2022-07-07 PROCEDURE — 80061 LIPID PANEL: CPT | Performed by: FAMILY MEDICINE

## 2022-07-07 PROCEDURE — 36415 COLL VENOUS BLD VENIPUNCTURE: CPT | Performed by: FAMILY MEDICINE

## 2022-07-07 PROCEDURE — 99214 OFFICE O/P EST MOD 30 MIN: CPT | Mod: 25 | Performed by: FAMILY MEDICINE

## 2022-07-07 PROCEDURE — 86803 HEPATITIS C AB TEST: CPT | Performed by: FAMILY MEDICINE

## 2022-07-07 PROCEDURE — 90715 TDAP VACCINE 7 YRS/> IM: CPT | Performed by: FAMILY MEDICINE

## 2022-07-07 PROCEDURE — 90471 IMMUNIZATION ADMIN: CPT | Performed by: FAMILY MEDICINE

## 2022-07-07 RX ORDER — LISINOPRIL 20 MG/1
20 TABLET ORAL DAILY
Qty: 90 TABLET | Refills: 3 | Status: SHIPPED | OUTPATIENT
Start: 2022-07-07 | End: 2023-05-25

## 2022-07-07 RX ORDER — GABAPENTIN 100 MG/1
100 CAPSULE ORAL AT BEDTIME
Qty: 90 CAPSULE | Refills: 3 | Status: SHIPPED | OUTPATIENT
Start: 2022-07-07 | End: 2023-05-25

## 2022-07-07 ASSESSMENT — ANXIETY QUESTIONNAIRES: GAD7 TOTAL SCORE: 0

## 2022-07-07 ASSESSMENT — PAIN SCALES - GENERAL: PAINLEVEL: NO PAIN (1)

## 2022-07-07 ASSESSMENT — PATIENT HEALTH QUESTIONNAIRE - PHQ9: SUM OF ALL RESPONSES TO PHQ QUESTIONS 1-9: 0

## 2022-09-03 ENCOUNTER — HEALTH MAINTENANCE LETTER (OUTPATIENT)
Age: 42
End: 2022-09-03

## 2022-12-08 ENCOUNTER — VIRTUAL VISIT (OUTPATIENT)
Dept: FAMILY MEDICINE | Facility: CLINIC | Age: 42
End: 2022-12-08
Payer: COMMERCIAL

## 2022-12-08 DIAGNOSIS — M54.42 CHRONIC LEFT-SIDED LOW BACK PAIN WITH LEFT-SIDED SCIATICA: ICD-10-CM

## 2022-12-08 DIAGNOSIS — Z98.890 S/P LUMBAR MICRODISCECTOMY: ICD-10-CM

## 2022-12-08 DIAGNOSIS — G89.29 CHRONIC LEFT-SIDED LOW BACK PAIN WITH LEFT-SIDED SCIATICA: ICD-10-CM

## 2022-12-08 DIAGNOSIS — M54.16 LUMBAR RADICULOPATHY: Primary | ICD-10-CM

## 2022-12-08 DIAGNOSIS — R26.81 UNSTEADY GAIT: ICD-10-CM

## 2022-12-08 PROCEDURE — 99214 OFFICE O/P EST MOD 30 MIN: CPT | Mod: 95 | Performed by: FAMILY MEDICINE

## 2022-12-08 RX ORDER — PREDNISONE 20 MG/1
TABLET ORAL
Qty: 15 TABLET | Refills: 0 | Status: SHIPPED | OUTPATIENT
Start: 2022-12-08 | End: 2023-05-25

## 2022-12-08 NOTE — PROGRESS NOTES
Dalton is a 41 year old who is being evaluated via a billable video visit.      How would you like to obtain your AVS? MyChart  If the video visit is dropped, the invitation should be resent by: Text to cell phone: [phone]  Will anyone else be joining your video visit? No          Assessment & Plan     Lumbar radiculopathy  I recommended he reach out to his neurosurgery clinic to schedule follow-up appointment about this recent flare of worsening symptoms.  In the meantime, he was given a prescription for prednisone and he may continue naproxen and gabapentin.  - predniSONE (DELTASONE) 20 MG tablet; Take 2 tablets (40 mg) daily x5 days.  Then take 1 tablet (20 mg) daily x5 days.    S/P lumbar microdiscectomy  This issue had been stable since surgery on 5/19/2021 until symptoms became acutely worse again a couple weeks ago.    Chronic left-sided low back pain with left-sided sciatica    Unsteady gait  He will take precautions regarding worsening gait during flares of low back pain.                   Return in about 4 weeks (around 1/5/2023) for Follow up if symptoms not improving..    [facility]   Dalton is a 41 year old, presenting for the following health issues:  No chief complaint on file.      HPI           He scheduled an appointment today to discuss worsening low back pain symptoms that are radiating into the left leg and also since some radiation into the right leg.  He originally injured his back on 2/26/2021.  The MRI from 3/10/2021 showed a left disc herniation at L5-S1 displacing the left S1 nerve root.  He eventually underwent an L5-S1 minimally invasive microdiscectomy on 5/19/2021.  Since that time he has been working on physical therapy and was started on gabapentin 100 mg at bedtime.  Symptoms had been stable until they became acutely worse 2 weeks ago.  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  This job requires quite a bit of bending, lifting and climbing ladders.  He denies having significant weakness or any numbness or tingling in the legs.    Review of Systems   Constitutional, HEENT, cardiovascular, pulmonary, gi and gu systems are negative, except as otherwise noted.      Objective           Vitals:  No vitals were obtained today due to virtual visit.    Physical Exam   GENERAL: Healthy, alert and no distress  EYES: Eyes grossly normal to inspection.  No discharge or erythema, or obvious scleral/conjunctival abnormalities.  RESP: No audible wheeze, cough, or visible cyanosis.  No visible retractions or increased work of breathing.    SKIN: Visible skin clear. No significant rash, abnormal pigmentation or lesions.  NEURO: Cranial nerves grossly intact.  Mentation and speech appropriate for age.  PSYCH: Mentation appears normal, affect normal/bright, judgement and insight intact, normal speech and appearance well-groomed.                Video-Visit Details    Video Start Time: 1:30 PM    Type of service:  Video Visit    Video End Time:1:44 PM    Originating Location (pt. Location): Home        Distant Location (provider location):  On-site    Platform used for Video Visit: Alpa

## 2022-12-08 NOTE — LETTER
December 8, 2022      Dalton M Primo  [address] [address]        To Whom It May Concern:    Please excuse Dalton SHANTE Tillman from work on 12/7/22 due to an exacerbation of low back pain.    Sincerely,        Kp Duenas, DO

## 2022-12-23 PROBLEM — M54.17 LUMBOSACRAL RADICULOPATHY AT S1: Status: RESOLVED | Noted: 2021-03-17 | Resolved: 2022-12-23

## 2023-04-29 ENCOUNTER — HEALTH MAINTENANCE LETTER (OUTPATIENT)
Age: 43
End: 2023-04-29

## 2023-05-24 ASSESSMENT — ENCOUNTER SYMPTOMS
CHILLS: 0
EYE PAIN: 0
HEARTBURN: 0
NAUSEA: 0
WEAKNESS: 0
JOINT SWELLING: 0
SHORTNESS OF BREATH: 0
MYALGIAS: 1
DIARRHEA: 0
COUGH: 0
FREQUENCY: 0
DIZZINESS: 0
PARESTHESIAS: 0
HEMATURIA: 0
ABDOMINAL PAIN: 0
ARTHRALGIAS: 0
SORE THROAT: 0
CONSTIPATION: 0
DYSURIA: 0
HEADACHES: 0
FEVER: 0
NERVOUS/ANXIOUS: 0
HEMATOCHEZIA: 0
PALPITATIONS: 0

## 2023-05-25 ENCOUNTER — OFFICE VISIT (OUTPATIENT)
Dept: FAMILY MEDICINE | Facility: CLINIC | Age: 43
End: 2023-05-25
Payer: COMMERCIAL

## 2023-05-25 VITALS
SYSTOLIC BLOOD PRESSURE: 146 MMHG | WEIGHT: 176.6 LBS | HEIGHT: 69 IN | DIASTOLIC BLOOD PRESSURE: 96 MMHG | TEMPERATURE: 97.1 F | HEART RATE: 75 BPM | BODY MASS INDEX: 26.16 KG/M2 | RESPIRATION RATE: 12 BRPM | OXYGEN SATURATION: 98 %

## 2023-05-25 DIAGNOSIS — Z72.0 TOBACCO USE: ICD-10-CM

## 2023-05-25 DIAGNOSIS — M54.50 CHRONIC LEFT-SIDED LOW BACK PAIN, UNSPECIFIED WHETHER SCIATICA PRESENT: ICD-10-CM

## 2023-05-25 DIAGNOSIS — Z13.1 SCREENING FOR DIABETES MELLITUS: ICD-10-CM

## 2023-05-25 DIAGNOSIS — F33.1 MODERATE EPISODE OF RECURRENT MAJOR DEPRESSIVE DISORDER (H): ICD-10-CM

## 2023-05-25 DIAGNOSIS — Z13.220 SCREENING CHOLESTEROL LEVEL: ICD-10-CM

## 2023-05-25 DIAGNOSIS — Z98.890 S/P LUMBAR MICRODISCECTOMY: ICD-10-CM

## 2023-05-25 DIAGNOSIS — M54.16 LUMBAR RADICULOPATHY: ICD-10-CM

## 2023-05-25 DIAGNOSIS — Z00.00 ROUTINE GENERAL MEDICAL EXAMINATION AT A HEALTH CARE FACILITY: Primary | ICD-10-CM

## 2023-05-25 DIAGNOSIS — A63.0 GENITAL WARTS: ICD-10-CM

## 2023-05-25 DIAGNOSIS — I10 ESSENTIAL HYPERTENSION, BENIGN: ICD-10-CM

## 2023-05-25 DIAGNOSIS — G89.29 CHRONIC LEFT-SIDED LOW BACK PAIN, UNSPECIFIED WHETHER SCIATICA PRESENT: ICD-10-CM

## 2023-05-25 LAB
ANION GAP SERPL CALCULATED.3IONS-SCNC: 11 MMOL/L (ref 7–15)
BUN SERPL-MCNC: 12.6 MG/DL (ref 6–20)
CALCIUM SERPL-MCNC: 9.8 MG/DL (ref 8.6–10)
CHLORIDE SERPL-SCNC: 103 MMOL/L (ref 98–107)
CHOLEST SERPL-MCNC: 216 MG/DL
CREAT SERPL-MCNC: 0.89 MG/DL (ref 0.67–1.17)
DEPRECATED HCO3 PLAS-SCNC: 23 MMOL/L (ref 22–29)
GFR SERPL CREATININE-BSD FRML MDRD: >90 ML/MIN/1.73M2
GLUCOSE SERPL-MCNC: 95 MG/DL (ref 70–99)
HDLC SERPL-MCNC: 65 MG/DL
LDLC SERPL CALC-MCNC: 136 MG/DL
NONHDLC SERPL-MCNC: 151 MG/DL
POTASSIUM SERPL-SCNC: 4.4 MMOL/L (ref 3.4–5.3)
SODIUM SERPL-SCNC: 137 MMOL/L (ref 136–145)
TRIGL SERPL-MCNC: 77 MG/DL

## 2023-05-25 PROCEDURE — 80061 LIPID PANEL: CPT | Performed by: FAMILY MEDICINE

## 2023-05-25 PROCEDURE — 99214 OFFICE O/P EST MOD 30 MIN: CPT | Mod: 25 | Performed by: FAMILY MEDICINE

## 2023-05-25 PROCEDURE — 36415 COLL VENOUS BLD VENIPUNCTURE: CPT | Performed by: FAMILY MEDICINE

## 2023-05-25 PROCEDURE — 99396 PREV VISIT EST AGE 40-64: CPT | Mod: 25 | Performed by: FAMILY MEDICINE

## 2023-05-25 PROCEDURE — 17110 DESTRUCTION B9 LES UP TO 14: CPT | Performed by: FAMILY MEDICINE

## 2023-05-25 PROCEDURE — 80048 BASIC METABOLIC PNL TOTAL CA: CPT | Performed by: FAMILY MEDICINE

## 2023-05-25 RX ORDER — BUPROPION HYDROCHLORIDE 150 MG/1
150 TABLET ORAL EVERY MORNING
Qty: 90 TABLET | Refills: 1 | Status: SHIPPED | OUTPATIENT
Start: 2023-05-25 | End: 2023-11-20

## 2023-05-25 RX ORDER — GABAPENTIN 300 MG/1
300 CAPSULE ORAL AT BEDTIME
Qty: 90 CAPSULE | Refills: 3 | Status: SHIPPED | OUTPATIENT
Start: 2023-05-25 | End: 2023-09-18

## 2023-05-25 RX ORDER — LISINOPRIL 20 MG/1
20 TABLET ORAL DAILY
Qty: 90 TABLET | Refills: 3 | Status: SHIPPED | OUTPATIENT
Start: 2023-05-25 | End: 2023-07-06

## 2023-05-25 ASSESSMENT — ENCOUNTER SYMPTOMS
JOINT SWELLING: 0
CHILLS: 0
COUGH: 0
HEADACHES: 0
ARTHRALGIAS: 0
CONSTIPATION: 0
DYSURIA: 0
PALPITATIONS: 0
FREQUENCY: 0
NAUSEA: 0
WEAKNESS: 0
HEMATOCHEZIA: 0
DIARRHEA: 0
EYE PAIN: 0
PARESTHESIAS: 0
HEARTBURN: 0
FEVER: 0
DIZZINESS: 0
SHORTNESS OF BREATH: 0
MYALGIAS: 1
ABDOMINAL PAIN: 0
NERVOUS/ANXIOUS: 0
HEMATURIA: 0
SORE THROAT: 0

## 2023-05-25 ASSESSMENT — PAIN SCALES - GENERAL: PAINLEVEL: NO PAIN (0)

## 2023-05-25 NOTE — PROGRESS NOTES
SUBJECTIVE:   CC: Dalton is an 42 year old who presents for preventative health visit.       5/25/2023     9:43 AM   Additional Questions   Roomed by Ned ESCALONA   Patient has been advised of split billing requirements and indicates understanding: Yes  Healthy Habits:     Getting at least 3 servings of Calcium per day:  Yes    Bi-annual eye exam:  NO    Dental care twice a year:  Yes    Sleep apnea or symptoms of sleep apnea:  Excessive snoring    Diet:  Other    Frequency of exercise:  2-3 days/week    Duration of exercise:  15-30 minutes    Taking medications regularly:  Yes    Medication side effects:  Not applicable    PHQ-2 Total Score: 1    Additional concerns today:  No    He has a medical history significant for hypertension, genital warts and chronic low back pain.  He has been off the lisinopril 20 mg over the past couple of months and has noticed that his BP is running high.  He reports noticing 3 new genital warts recently.  He would like to discuss getting these treated today.    He originally injured his back on 2/26/2021.  The MRI from 3/10/2021 showed a left disc herniation at L5-S1 displacing the left S1 nerve root.  He eventually underwent an L5-S1 minimally invasive microdiscectomy on 5/19/2021.  Since that time he has been working on physical therapy and was started on gabapentin 100 mg at bedtime.  He felt like the gabapentin was not helping much at the 100 mg dose.  He has not tried a higher dose of this yet.  He has been working with other modalities to help with pain control such as acupuncture.    He admits that he has struggled with depression off and on over many years.  He is seeing a therapist.  He also recently started smoking again.  He is planning to quit soon.    Social history: , currently unemployed.  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                Today's PHQ-2 Score:       5/24/2023     9:17 PM   PHQ-2 ( 1999  [employer])   Q1: Little interest or pleasure in doing things 0   Q2: Feeling down, depressed or hopeless 1   PHQ-2 Score 1   Q1: Little interest or pleasure in doing things Not at all   Q2: Feeling down, depressed or hopeless Several days   PHQ-2 Score 1         Social History     Tobacco Use     Smoking status: Some Days     Packs/day: 0.25     Years: 0.00     Pack years: 0.00     Types: Cigarettes     Last attempt to quit: 10/15/2016     Years since quittin.6     Smokeless tobacco: Never   Vaping Use     Vaping status: Never Used   Substance Use Topics     Alcohol use: Yes     Comment: Socailly             2023     9:16 PM   Alcohol Use   Prescreen: >3 drinks/day or >7 drinks/week? Yes   AUDIT SCORE  8         2023     9:16 PM   AUDIT - Alcohol Use Disorders Identification Test - Reproduced from the World Health Organization Audit 2001 (Second Edition)   1.  How often do you have a drink containing alcohol? 4 or more times a week   2.  How many drinks containing alcohol do you have on a typical day when you are drinking? 3 or 4   3.  How often do you have five or more drinks on one occasion? Monthly   4.  How often during the last year have you found that you were not able to stop drinking once you had started? Never   5.  How often during the last year have you failed to do what was normally expected of you because of drinking? Never   6.  How often during the last year have you needed a first drink in the morning to get yourself going after a heavy drinking session? Never   7.  How often during the last year have you had a feeling of guilt or remorse after drinking? Never   8.  How often during the last year have you been unable to remember what happened the night before because of your drinking? Less than monthly   9.  Have you or someone else been injured because of your drinking? No   10. Has a relative, friend, doctor or other health care worker been concerned about your drinking or suggested you  "cut down? No   TOTAL SCORE 8       Last PSA: No results found for: PSA    Reviewed orders with patient. Reviewed health maintenance and updated orders accordingly - Yes  Lab work is in process  Labs reviewed in EPIC    Reviewed and updated as needed this visit by clinical staff   Tobacco  Allergies  Meds              Reviewed and updated as needed this visit by Provider                     Review of Systems   Constitutional: Negative for chills and fever.   HENT: Negative for congestion, ear pain, hearing loss and sore throat.    Eyes: Negative for pain and visual disturbance.   Respiratory: Negative for cough and shortness of breath.    Cardiovascular: Negative for chest pain, palpitations and peripheral edema.   Gastrointestinal: Negative for abdominal pain, constipation, diarrhea, heartburn, hematochezia and nausea.   Genitourinary: Positive for genital sores. Negative for dysuria, frequency, hematuria, impotence, penile discharge and urgency.   Musculoskeletal: Positive for myalgias. Negative for arthralgias and joint swelling.   Skin: Negative for rash.   Neurological: Negative for dizziness, weakness, headaches and paresthesias.   Psychiatric/Behavioral: Negative for mood changes. The patient is not nervous/anxious.          OBJECTIVE:   BP (!) 146/96   Pulse 75   Temp 97.1  F (36.2  C) (Temporal)   Resp 12   Ht 1.753 m (5' 9\")   Wt 80.1 kg (176 lb 9.6 oz)   SpO2 98%   BMI 26.08 kg/m      Physical Exam  GENERAL: healthy, alert and no distress  EYES: Eyes grossly normal to inspection, PERRL and conjunctivae and sclerae normal  HENT: ear canals and TM's normal, nose and mouth without ulcers or lesions  NECK: no adenopathy, no asymmetry, masses, or scars and thyroid normal to palpation  RESP: lungs clear to auscultation - no rales, rhonchi or wheezes  CV: regular rate and rhythm, normal S1 S2, no S3 or S4, no murmur, click or rub, no peripheral edema and peripheral pulses strong  ABDOMEN: soft, " nontender, no hepatosplenomegaly, no masses and bowel sounds normal  MS: no gross musculoskeletal defects noted, no edema.  He is moderately tender to palpation in the lumbar spine and paraspinal muscles.  SKIN: There are 3 hyperkeratotic, slightly raised skin lesions on the shaft of his penis consistent with genital warts.  NEURO: Normal strength and tone, mentation intact and speech normal.  Deep tendon reflexes 2 out of 4 bilaterally.  PSYCH: mentation appears normal, affect normal/bright    Diagnostic Test Results:  Labs reviewed in Epic    ASSESSMENT/PLAN:   1. Routine general medical examination at a health care facility  I encouraged him to keep working on getting regular exercise and eat a healthy diet.  We are going to check labs as listed below.    2. Essential hypertension, benign  Blood pressure is elevated today.  I recommended he restart lisinopril.  He is going to start monitoring his blood pressure at home and we are going to be doing a follow-up visit in about a month.  - Basic metabolic panel  (Ca, Cl, CO2, Creat, Gluc, K, Na, BUN); Future  - lisinopril (ZESTRIL) 20 MG tablet; Take 1 tablet (20 mg) by mouth daily  Dispense: 90 tablet; Refill: 3  - Basic metabolic panel  (Ca, Cl, CO2, Creat, Gluc, K, Na, BUN)    3. Moderate episode of recurrent major depressive disorder (H)  We discussed treatment options and decided to start Wellbutrin since he has been struggling with depression symptoms and tobacco use.  He is going to schedule follow-up in 1 month or sooner if needed.  - buPROPion (WELLBUTRIN XL) 150 MG 24 hr tablet; Take 1 tablet (150 mg) by mouth every morning  Dispense: 90 tablet; Refill: 1    4. Tobacco use  We discussed smoking cessation and I encouraged him to quit.  We discussed treatment options and decided to start Wellbutrin.    5. Lumbar radiculopathy  He reports ongoing low back pain symptoms, although he is having less radiation at this time.  Since the 100 mg gabapentin dose was  "not helping he is interested in trying a higher dose.  I sent in a prescription for 300 mg at bedtime to see if this provides better relief.  - gabapentin (NEURONTIN) 300 MG capsule; Take 1 capsule (300 mg) by mouth At Bedtime  Dispense: 90 capsule; Refill: 3    6. Chronic left-sided low back pain, unspecified whether sciatica present  He may continue working on the physical therapy exercises and acupuncture.    7. S/P lumbar microdiscectomy    8. Genital warts  We discussed treatment options and decided to proceed with cryotherapy.  I applied 3 freeze thaw cycles of liquid nitrogen to the 3 penile/genital warts.  He tolerated the procedure well.  Wound care instructions were given.  - DESTRUCT BENIGN LESION, UP TO 14    9. Screening cholesterol level  - Lipid Profile (Chol, Trig, HDL, LDL calc); Future  - Lipid Profile (Chol, Trig, HDL, LDL calc)    10. Screening for diabetes mellitus  - Basic metabolic panel  (Ca, Cl, CO2, Creat, Gluc, K, Na, BUN); Future  - Basic metabolic panel  (Ca, Cl, CO2, Creat, Gluc, K, Na, BUN)      Patient has been advised of split billing requirements and indicates understanding: Yes      COUNSELING:   Reviewed preventive health counseling, as reflected in patient instructions       Regular exercise       Healthy diet/nutrition      BMI:   Estimated body mass index is 26.08 kg/m  as calculated from the following:    Height as of this encounter: 1.753 m (5' 9\").    Weight as of this encounter: 80.1 kg (176 lb 9.6 oz).   Weight management plan: Discussed healthy diet and exercise guidelines      He reports that he has been smoking cigarettes. He has been smoking an average of 0.25 packs per day. He has never used smokeless tobacco.            Kp Duenas, DO  [facility]  "

## 2023-07-06 ENCOUNTER — VIRTUAL VISIT (OUTPATIENT)
Dept: FAMILY MEDICINE | Facility: CLINIC | Age: 43
End: 2023-07-06
Payer: COMMERCIAL

## 2023-07-06 DIAGNOSIS — F33.1 MODERATE EPISODE OF RECURRENT MAJOR DEPRESSIVE DISORDER (H): ICD-10-CM

## 2023-07-06 DIAGNOSIS — R05.3 PERSISTENT DRY COUGH: ICD-10-CM

## 2023-07-06 DIAGNOSIS — Z98.890 S/P LUMBAR MICRODISCECTOMY: ICD-10-CM

## 2023-07-06 DIAGNOSIS — G89.29 CHRONIC LEFT-SIDED LOW BACK PAIN, UNSPECIFIED WHETHER SCIATICA PRESENT: ICD-10-CM

## 2023-07-06 DIAGNOSIS — I10 ESSENTIAL HYPERTENSION, BENIGN: Primary | ICD-10-CM

## 2023-07-06 DIAGNOSIS — M54.50 CHRONIC LEFT-SIDED LOW BACK PAIN, UNSPECIFIED WHETHER SCIATICA PRESENT: ICD-10-CM

## 2023-07-06 PROCEDURE — 99214 OFFICE O/P EST MOD 30 MIN: CPT | Mod: VID | Performed by: FAMILY MEDICINE

## 2023-07-06 RX ORDER — OMEGA-3 FATTY ACIDS/FISH OIL 300-1000MG
400 CAPSULE ORAL EVERY 4 HOURS PRN
COMMUNITY

## 2023-07-06 RX ORDER — LOSARTAN POTASSIUM 50 MG/1
50 TABLET ORAL DAILY
Qty: 90 TABLET | Refills: 3 | Status: SHIPPED | OUTPATIENT
Start: 2023-07-06 | End: 2023-09-18

## 2023-07-06 ASSESSMENT — PATIENT HEALTH QUESTIONNAIRE - PHQ9: SUM OF ALL RESPONSES TO PHQ QUESTIONS 1-9: 0

## 2023-07-06 NOTE — PROGRESS NOTES
Dalton is a 42 year old who is being evaluated via a billable video visit.      How would you like to obtain your AVS? MyChart  If the video visit is dropped, the invitation should be resent by: Text to cell phone: [phone]  Will anyone else be joining your video visit? No          Assessment & Plan     Essential hypertension, benign  His blood pressure has been under good control on lisinopril 20 mg daily, but he has been struggling with a persistent dry cough.  We decided to switch to losartan 50 mg daily to see if this provides good blood pressure control without the dry cough side effect.  He will continue to monitor his blood pressure closely at home and let me know if it is running high.  - losartan (COZAAR) 50 MG tablet; Take 1 tablet (50 mg) by mouth daily    Persistent dry cough  We are switching from lisinopril to losartan to see if this helps.    Moderate episode of recurrent major depressive disorder (H)  Depression symptoms have improved significantly since starting Wellbutrin.    Chronic left-sided low back pain, unspecified whether sciatica present  I recommended he continue the physical therapy exercises and acupuncture.  He was also given a referral to the pain clinic to discuss other modalities that may be helpful.  - Pain Management  Referral; Future    S/P lumbar microdiscectomy  - Pain Management  Referral; Future                 Kp Duenas, [facility]   Dalton is a 42 year old, presenting for the following health issues:  Follow Up (Medication F/U)        5/25/2023     9:43 AM   Additional Questions   Roomed by Ned ESCALONA     History of Present Illness       Reason for visit:  Follow up to see how new prescriptions have been working. Was instructed by Dr. Lashawn Duenas to make an appointment 6 weeks from the previous appointment to check in.              He has a medical history significant for hypertension and chronic low  "back pain s/p L5-S1 minimally invasive microdiscectomy on 5/19/2021.  When he was recently seen for his physical exam on 5/25/2023 his blood pressure was running high.  He was off the lisinopril at the time.  Since then, he has restarted lisinopril and his blood pressure has been much better.  At home his BP averages 115-120s/70s-80s.  He is not having headaches or blurry vision.  This morning his blood pressure was 115/80.  He has noticed a dry cough that has been occurring every night when he lies down.  He denies having congestion or postnasal drainage symptoms.  He reports not coughing during the day though.    He continues to struggle with chronic low back pain symptoms.  He is wondering what other treatment options may be available besides physical therapy and acupuncture.  He is currently taking gabapentin at bedtime.    On 5/25/2023 he was less started on Wellbutrin to help with depression symptoms and smoking cessation.  He reports noticing significant improvement in his mood and he is no longer smoking.  He would like to continue taking this medication.      Review of Systems   Constitutional, HEENT, cardiovascular, pulmonary, GI, , musculoskeletal, neuro, skin, endocrine and psych systems are negative, except as otherwise noted.      Objective    Vitals - Patient Reported  Systolic (Patient Reported): 115  Diastolic (Patient Reported): 80  Weight (Patient Reported): 79.9 kg (176 lb 3.2 oz)  Height (Patient Reported): 172.7 cm (5' 8\")  BMI (Based on Pt Reported Ht/Wt): 26.79  Pulse (Patient Reported): 67  Pain Score: Moderate Pain (4)  Pain Loc: Hip      Vitals:  No vitals were obtained today due to virtual visit.    Physical Exam   GENERAL: Healthy, alert and no distress  EYES: Eyes grossly normal to inspection.  No discharge or erythema, or obvious scleral/conjunctival abnormalities.  RESP: No audible wheeze, cough, or visible cyanosis.  No visible retractions or increased work of breathing.    SKIN: " Visible skin clear. No significant rash, abnormal pigmentation or lesions.  NEURO: Cranial nerves grossly intact.  Mentation and speech appropriate for age.  PSYCH: Mentation appears normal, affect normal/bright, judgement and insight intact, normal speech and appearance well-groomed.                Video-Visit Details    Type of service:  Video Visit     Originating Location (pt. Location): Home    Distant Location (provider location):  On-site  Platform used for Video Visit: [email or web address]

## 2023-09-13 ASSESSMENT — ENCOUNTER SYMPTOMS
JOINT SWELLING: 0
STIFFNESS: 0
MYALGIAS: 0
BACK PAIN: 1
MUSCLE CRAMPS: 0
ARTHRALGIAS: 0
NECK PAIN: 0
MUSCLE WEAKNESS: 0

## 2023-09-14 ENCOUNTER — TELEPHONE (OUTPATIENT)
Dept: PALLIATIVE MEDICINE | Facility: CLINIC | Age: 43
End: 2023-09-14

## 2023-09-14 ENCOUNTER — OFFICE VISIT (OUTPATIENT)
Dept: ANESTHESIOLOGY | Facility: CLINIC | Age: 43
End: 2023-09-14
Attending: FAMILY MEDICINE
Payer: COMMERCIAL

## 2023-09-14 VITALS
HEART RATE: 103 BPM | DIASTOLIC BLOOD PRESSURE: 87 MMHG | HEIGHT: 69 IN | BODY MASS INDEX: 26.07 KG/M2 | SYSTOLIC BLOOD PRESSURE: 155 MMHG | WEIGHT: 176 LBS | OXYGEN SATURATION: 100 %

## 2023-09-14 DIAGNOSIS — Z98.890 S/P LUMBAR MICRODISCECTOMY: ICD-10-CM

## 2023-09-14 DIAGNOSIS — M54.50 CHRONIC LEFT-SIDED LOW BACK PAIN, UNSPECIFIED WHETHER SCIATICA PRESENT: ICD-10-CM

## 2023-09-14 DIAGNOSIS — M54.17 LUMBOSACRAL RADICULOPATHY: Primary | ICD-10-CM

## 2023-09-14 DIAGNOSIS — G89.29 CHRONIC LEFT-SIDED LOW BACK PAIN, UNSPECIFIED WHETHER SCIATICA PRESENT: ICD-10-CM

## 2023-09-14 DIAGNOSIS — M79.18 MYOFASCIAL PAIN SYNDROME OF LUMBAR SPINE: ICD-10-CM

## 2023-09-14 PROCEDURE — 99204 OFFICE O/P NEW MOD 45 MIN: CPT | Performed by: ANESTHESIOLOGY

## 2023-09-14 ASSESSMENT — PAIN SCALES - GENERAL: PAINLEVEL: MODERATE PAIN (4)

## 2023-09-14 NOTE — TELEPHONE ENCOUNTER
Patient is scheduled for surgery with Dr. Li    Spoke with: patient    Date of Surgery: 10/19/23    Location: [facility]    Informed patient they will need an adult  yes    Additional comments: patient is aware of date and time of the procedure.        Calista Ambrose MA on 9/14/2023 at 10:15 AM

## 2023-09-14 NOTE — PROGRESS NOTES
[facility] Consultation    Date of visit: 9/14/2023    Reason for consultation:    Dalton Tillman is a 42 year old male who is seen in consultation today at the request of his provider, Kp Duenas DO.    Primary Care Provider is Kp Will.  Pain medications are being prescribed by PCP.    Please see the [facility] health questionnaire which the patient completed and reviewed with me in detail.    Chief Complaint:    Chief Complaint   Patient presents with    Consult     Consult Left Side Lower Back to Leg Pain       Pain history:  Dalton Tillman is a 42 year old male who first started having problems with pain in the low back that has been fairly constant and first began in 2020. He has a history of microdiscectomy (5/19/2021) that did improve the symptoms significantly but not completely. He had also had a TFESI prior to microdiscectomy. The injection was not helpful and therefore he had the surgery. He describes the pain that begins with tightness in the low back that then radites into the left buttock and then progresses down the entire leg. The pain radiates down the posterior thigh and then can go as low as the ankle on the left leg.     Pain rating: intensity ranges from 1/10 to 9/10, and Averages 4/10 on a 0-10 scale.  Aggravating factors include: sitting in car  Relieving factors include: stretching, laying flat, walking  Any bowel or bladder incontinence: denies    Current treatments include:  Gabapentin 300 mg nightly   Ibuprofen    Previous medication treatments included:      Other treatments have included:  Dalton Tillman has been seen at a pain clinic in the past.  Seen by Dr Johnson in 2021 for Left S1 TFESI  PT: yes, completed full course and does his exercises/stretches daily   Acupuncture: Yes - mildly helpful  TENs Unit: yes  Injections: yes, once     Past Medical History:  Past Medical History:   Diagnosis  Date    Hypertension      Patient Active Problem List    Diagnosis Date Noted    Chronic left-sided low back pain with left-sided sciatica 05/26/2022     Priority: Medium    Essential hypertension, benign 05/26/2022     Priority: Medium    Herniation of lumbar intervertebral disc with radiculopathy 04/08/2021     Priority: Medium     Added automatically from request for surgery [id number]      Herniation of intervertebral disc between L5 and S1 03/17/2021     Priority: Medium    CARDIOVASCULAR SCREENING; LDL GOAL LESS THAN 160 09/07/2012     Priority: Medium       Past Surgical History:  Past Surgical History:   Procedure Laterality Date    DISCECTOMY LUMBAR MINIMALLY INVASIVE ONE LEVEL Left 5/19/2021    Procedure: Left L5-S1 minimally invasive microdiscectomy. ;  Surgeon: Filipe Bone MD;  Location:  OR    ORTHOPEDIC SURGERY  2/2016    left wrist     Medications:  Current Outpatient Medications   Medication Sig Dispense Refill    buPROPion (WELLBUTRIN XL) 150 MG 24 hr tablet Take 1 tablet (150 mg) by mouth every morning 90 tablet 1    gabapentin (NEURONTIN) 300 MG capsule Take 1 capsule (300 mg) by mouth At Bedtime 90 capsule 3    ibuprofen (ADVIL/MOTRIN) 200 MG capsule Take 400 mg by mouth every 4 hours as needed for fever      losartan (COZAAR) 50 MG tablet Take 1 tablet (50 mg) by mouth daily 90 tablet 3     Allergies:   No Known Allergies    Social History:  Home situation: lives with wife  Occupation/Schooling: currently not working, previously worked for [employer], 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 Father         MI, 50         Review of Systems:    POSTIVE IN BOLD  GENERAL: fever/chills, fatigue, general unwell feeling, weight gain/loss.  HEAD/EYES:   "headache, dizziness, or vision changes.    EARS/NOSE/THROAT:  Nosebleeds, hearing loss, sinus infection, earache, tinnitus.  IMMUNE:  Allergies, cancer, immune deficiency, or infections.  SKIN:  Urticaria, rash, hives  HEME/Lymphatic:   anemia, easy bruising, easy bleeding.  RESPIRATORY:  cough, wheezing, or shortness of breath  CARDIOVASCULAR/Circulation:  Extremity edema, syncope, hypertension, tachycardia, or angina.  GASTROINTESTINAL:  abdominal pain, nausea/emesis, diarrhea, constipation,  hematochezia, or melena.  ENDOCRINE:  Diabetes, steroid use,  thyroid disease or osteoporosis.  MUSCULOSKELETAL: neck pain, back pain, arthralgia, arthritis, or gout.  GENITOURINARY:  frequency, urgency, dysuria, difficulty voiding, hematuria or incontinence.  NEUROLOGIC:  weakness, numbness, paresthesias, seizure, tremor, stroke or memory loss.  PSYCHIATRIC:  depression, anxiety, stress, suicidal thoughts or mood swings.     Physical Exam:  Vitals:    09/14/23 0921   BP: (!) 155/87   BP Location: Left arm   Patient Position: Chair   Cuff Size: Adult Large   Pulse: 103   SpO2: 100%   Weight: 79.8 kg (176 lb)   Height: 1.753 m (5' 9\")     Exam:  Constitutional: healthy, alert, and no distress  Head: normocephalic. Atraumatic.   Eyes: no redness or jaundice noted   ENT: oropharnx normal.  MMM.  Neck supple.    Cardiovascular: RRR no m/g/r   Respiratory: clear   Gastrointestinal: soft, non-tender, normoactive bowel sounds   : deferred  Skin: no suspicious lesions or rashes  Psychiatric: mentation appears normal and affect normal/bright    Musculoskeletal exam:  Gait/Station/Posture: normal  Lumbar spine: Normal ROM    Rotation/ext to right: pain free   Rotation/ext to left: pain free    Myofascial tenderness:  negative  Straight leg exam: Positive L > R  Enmanuel's maneuver: negative    Neurologic exam:  CN:  Cranial nerves 2-12 are normal  Motor:  5/5 UE and LE strength  Reflexes:     Patella:  R:  2/4 L: 2/4   Achilles:  R:  " "2/4 L: 2/4  Other reflexes:  Toes downgoing   Sensory:  (upper and lower extremities):   Light touch: normal    Allodynia: absent    Dysethesia: absent    Hyperalgesia: absent     Diagnostic tests:  MRI of Lumbar spine was completed on 3/10/2021 showing:  \"MRI LUMBAR SPINE WITHOUT CONTRAST   3/10/2021 4:25 PM      HISTORY: Acute left-sided low back pain with left-sided sciatica.  Lumbosacral radiculopathy.     TECHNIQUE: Multiplanar multisequence MRI of the lumbar spine without  contrast.     COMPARISON: X-rays of the lumbar spine 3/10/2021      FINDINGS: Alignment is within normal limits. Bone marrow demonstrates  mild edema is from the L5-S1 disc joint. Conus medullaris and cauda  equina are unremarkable. Conus medullaris terminates at the level of  the L1-L2 disc. No appreciable extraspinal abnormality.     Segmental Analysis:      T12-L1:  Disc height maintained. No herniation. Normal facet joints.  No foraminal or spinal canal stenosis.      L1-L2:  Disc height maintained. No herniation. Normal facet joints. No  foraminal or spinal canal stenosis.       L2-L3:  Disc height maintained. No herniation. Normal facet joints. No  foraminal or spinal canal stenosis.       L3-L4:  Disc height maintained. No herniation. Normal facet joints. No  foraminal or spinal canal stenosis.       L4-L5:  Disc height maintained. No herniation. Mild bilateral facet  arthropathy. No foraminal or spinal canal stenosis.       L5-S1:  Mild to moderate disc height loss. Left central disc extrusion  which contacts and displaces the traversing left S1 nerve root within  the lateral recess. Mild bilateral facet arthropathy. Mild right  foraminal stenosis. No left foraminal stenosis. Mild spinal canal  stenosis.                                                                      IMPRESSION: Left central disc extrusion at L5-S1\"    Personally reviewed imaging prior to visit and again with patient during visit    Other testing (labs, " diagnostics) reviewed:  Labs  No results found for: A1C  Last Comprehensive Metabolic Panel:  Lab Results   Component Value Date     05/25/2023    POTASSIUM 4.4 05/25/2023    CHLORIDE 103 05/25/2023    CO2 23 05/25/2023    ANIONGAP 11 05/25/2023    GLC 95 05/25/2023    BUN 12.6 05/25/2023    CR 0.89 05/25/2023    GFRESTIMATED >90 05/25/2023    SHANNON 9.8 05/25/2023         [place] Prescription Monitoring Program reviewed - appropriate    Outside records reviewed      Assessment:  Lumbar Radiculopathy    Dalton Tillman is a 42 year old male who presents with the complaints of low back pain that is consistent with lumbar radiculopathy based on his history, physical exam, and image findings. He has already undergone a microdiscectomy but continues to have radicular symptoms radiating down his leg. At this time we discussed a repeat LESI to see if this will help with his symptoms. We also discussed conservative options, including TENS unit and pain psychology. He has completed physical therapy already, and continues to do daily exercises and stretches as learned with PT    Plan:  Diagnosis reviewed, treatment option addressed, and risk/benefits discussed.  Self-care instructions given.  I am recommending a multidisciplinary treatment plan to help this patient better manage his pain.      Physical Therapy: continue current regimen  Pain Psychologist to address issues of relaxation, behavioral change, coping style, and other factors important to improvement: Referral placed  Diagnostic Studies: none  Medication Management: none  Further procedures recommended: Lumbosacral Epidural Steroid Injection  Other treatments: TENS unit   Recommendations/follow-up for PCP:  none  Follow up: 6 weeks after procedure    Total time spent was 40 minutes, and more than 50% of face to face time was spent in counseling and/or coordination of care regarding principles of multidisciplinary care, medication management, and therapeutic options.      Carolina Li MD    Pain Medicine  Department of Anesthesiology  [facility]      Answers submitted by the patient for this visit:  Symptoms you have experienced in the last 30 days (Submitted on 9/13/2023)  General Symptoms: No  Skin Symptoms: No  HENT Symptoms: No  EYE SYMPTOMS: No  HEART SYMPTOMS: No  LUNG SYMPTOMS: No  INTESTINAL SYMPTOMS: No  URINARY SYMPTOMS: No  REPRODUCTIVE SYMPTOMS: No  SKELETAL SYMPTOMS: Yes  BLOOD SYMPTOMS: No  NERVOUS SYSTEM SYMPTOMS: No  MENTAL HEALTH SYMPTOMS: No  Please answer the questions below to tell us what condition you are experiencing: (Submitted on 9/13/2023)  Back pain: Yes  Muscle aches: No  Neck pain: No  Swollen joints: No  Joint pain: No  Bone pain: No  Muscle cramps: No  Muscle weakness: No  Joint stiffness: No  Bone fracture: No

## 2023-09-14 NOTE — NURSING NOTE
Patient presents with:  Consult: Consult Left Side Lower Back to Leg Pain      Moderate Pain (4)         What medications are you using for pain? Ibuprofen, Evil Bone Water for numbing the pain area    (New patients only) Have you been seen by another pain clinic/ provider? no    (Return Patients only) What refills are you needing today? No    Expectation To find some way to reduce the pain not medication some other options

## 2023-09-14 NOTE — PATIENT INSTRUCTIONS
Medications:    Please discuss with PCP about increasing Gabapentin to 300 mg three times a day.      Referrals:      Pain Psychology Referral placed-  Please contact their scheduling office at [phone] to schedule, if you have not heard from them within 2 business days.     Pain psychology Therapies Requested- Biofeedback, Mindfulness training, CBT, Coping and relaxation therapy.     Please schedule a follow up appointment with your Pain Clinic provider after completing Pain Psychology appointment. Schedule appointment by calling [phone].       Procedures:    Call to schedule your procedure: [phone] option #2    Lumbosacral Epidural Steroid Injection     Your pre-procedure instructions are below, please call our clinic if you have any questions.      Treatment planning:    TENS Unit ordered.    [employer]:        [facility] [facility] [place]  [address], [address]  [place] [address] [phone]          [facility]  [address] [place] [address] [phone]        [facility] [place]  [address] [place] [id number] [phone]        [facility] - [place], [place] [id number] [phone]        [facility] [place] [address] [phone]        [facility] - [place], [place] [id number] [phone]     Recommended Follow up:      Follow up 6 weeks after procedure.          Please call [phone] to schedule your clinic appointment if you don't already have an appointment scheduled.      Procedure Information related to COVID-19     Please call [phone] option #2 to schedule, reschedule, or cancel your procedure appointment.   Phones are answered Monday - Friday from 08:00 - 4:30pm.  Leave a  voicemail with your name, birth date, and phone number if no one is available to take your call.        You no longer need to test for COVID- 19 prior to your procedure/surgery, unless your physician specifically requests that you test. If you experience COVID symptoms or have tested positive for COVID-19 within 14 days of your scheduled surgery or procedure, please update our office right away and your procedure may have to be postponed.       The procedure center staff will call you several days before the procedure to review important information that you will need to know for the day of the procedure.     Please contact the clinic if you have further questions about this information [phone].        Information related to Scheduling and Pre-Procedure Instructions:    If you must reschedule your procedure more than two times, you must follow up in clinic before rescheduling again.    Preparing for your procedure    CAUTION - FAILURE TO FOLLOW THESE PRE-PROCEDURE INSTRUCTIONS WILL RESULT IN YOUR PROCEDURE BEING RESCHEDULED.    Your Procedure: Lumbosacral Epidural Steroid Injection         You must have a  take you home after your procedure. Transportation by taxi or para-transit is okay as long as you have a responsible adult accompany you. You must provide your 's full name and contact number at time of check in.     Fasting Protocol Please have nothing to eat or drink 1 hour prior to arrival.     Medications If you take any medications, DO NOT STOP. Take your medications as usual the day of your procedure with a sip of water AT LEAST 2 HOURS PRIOR TO ARRIVAL.    Antibiotics If you are currently taking antibiotics, you must complete the entire dose 7 days prior to your scheduled procedure. You must be clear of any signs or symptoms of infection. If you begin antibiotics, please contact our clinic for instructions.     Fever, Chills, or Rash If you experience a fever of higher than 100  degrees, chills, rash, or open wounds during the one week before your procedure, please call the clinic to see if you may proceed with your procedure.      Medication Hold List  **Patients under Cardiology/Neurology care should consult their provider prior to the pain procedure to verify pre-procedure medication instructions. The information below contains general guidelines.**      Blood Thinners If you are taking daily ASPIRIN, PLAVIX, OR OTHER BLOOD THINNERS SUCH AS COUMADIN/WARFARIN, we will need your prescribing doctor to sign a release permitting you to stop these medications. Once approved by your prescribing doctor - STOP ALL BLOOD THINNERS BASED ON THE TIME TABLE BELOW PRIOR TO YOUR PROCEDURE. If you have been instructed to stop WARFARIN(COUMADIN), you must have an INR lab drawn the day before your procedure. Your INR must be within normal limits before we can perform your injection. MEDICATIONS CAN BE RESTARTED AFTER YOUR PROCEDURE.    24 HOUR HOLD  Lovenox (enoxaparin)  Agrylin (Anagrelide)    3 DAY HOLD  Xarelto (rivaroxaban)    5 DAY HOLD  Coumadin (Warfarin)  Brilinta (ticagrelor) 7 DAY HOLD  Anacin, Bufferin, Ecotrin, Excedrin, Aggrenox (Aspirin)  Pradexa (Dabigatran)  Elmiron (Pentosan)  Plavix (Clopidogrel Bisulfate)  Pletal (Cilostazol)    10 DAY HOLD  Effient (Prasugel)    14 DAY HOLD  Ticlid (ticlopidine)        Non-steroidal Anti-inflammatories (NSAIDs) DO NOT TAKE any non-steroidal anti-inflammatory medications (NSAIDs) listed on the table below. MEDICATIONS CAN BE RESTARTED AFTER YOUR PROCEDURE. Celebrex is OK to take and does not need to be discontinued.     Medications to stop:  1 DAY HOLD  Advil, Motrin (Ibuprofen)  Voltaren (Diclofenac)  Toradol (Ketorolac)    3 DAY HOLD  Arthrotec (diciofenac sodium/misoprostol)  Clinoril (Sulindac)  Indocin (Indomethacin)  Lodine (Etodolac)  Vicoprofen (Hydrocodone and Ibuprofen)  Apixaban (Eliquis)    4 DAY HOLD  Mobic (Meloxicam)  Naprosyn (Naproxen)    7 DAY HOLD  Aleve (Naproxen sodium)  Darvon compound (contains aspirin)  Norgesic Forte (contains aspirin)  Oruvall (Ketoprofen)  Percodan (contains aspirin)  Relafen (Nabumetone)  Salsalate  Trilisate  Vitamin E (more than 400 mg per day)  Any medication containing aspirin    14 DAY HOLD  Daypro (Oxaprozin)  Feldene (Piroxicam)            To speak with a nurse, schedule/reschedule/cancel a clinic appointment, or request a medication refill call: [phone]    You can also reach us by [employer]: [email or web address]

## 2023-09-14 NOTE — TELEPHONE ENCOUNTER
RN reviewed patient chart. Pre procedure instructions were reviewed with patient.    Sirisha Cain RNCC

## 2023-09-14 NOTE — LETTER
9/14/2023       RE: Dalton Tillman  [address]  [facility] [address]       Dear Colleague,    Thank you for referring your patient, Dalton Tillman, to the [facility] [place] at [facility]. Please see a copy of my visit note below.                          [facility] Consultation    Date of visit: 9/14/2023    Reason for consultation:    Dalton Tillman is a 42 year old male who is seen in consultation today at the request of his provider, Kp Duenas DO.    Primary Care Provider is Kp Will.  Pain medications are being prescribed by PCP.    Please see the [facility] [place] health questionnaire which the patient completed and reviewed with me in detail.    Chief Complaint:    Chief Complaint   Patient presents with    Consult     Consult Left Side Lower Back to Leg Pain       Pain history:  Dalton Tillman is a 42 year old male who first started having problems with pain in the low back that has been fairly constant and first began in 2020. He has a history of microdiscectomy (5/19/2021) that did improve the symptoms significantly but not completely. He had also had a TFESI prior to microdiscectomy. The injection was not helpful and therefore he had the surgery. He describes the pain that begins with tightness in the low back that then radites into the left buttock and then progresses down the entire leg. The pain radiates down the posterior thigh and then can go as low as the ankle on the left leg.     Pain rating: intensity ranges from 1/10 to 9/10, and Averages 4/10 on a 0-10 scale.  Aggravating factors include: sitting in car  Relieving factors include: stretching, laying flat, walking  Any bowel or bladder incontinence: denies    Current treatments include:  Gabapentin 300 mg nightly   Ibuprofen    Previous medication treatments included:      Other  treatments have included:  Dalton Tillman has been seen at a pain clinic in the past.  Seen by Dr Johnson in 2021 for Left S1 TFESI  PT: yes, completed full course and does his exercises/stretches daily   Acupuncture: Yes - mildly helpful  TENs Unit: yes  Injections: yes, once     Past Medical History:  Past Medical History:   Diagnosis Date    Hypertension      Patient Active Problem List    Diagnosis Date Noted    Chronic left-sided low back pain with left-sided sciatica 05/26/2022     Priority: Medium    Essential hypertension, benign 05/26/2022     Priority: Medium    Herniation of lumbar intervertebral disc with radiculopathy 04/08/2021     Priority: Medium     Added automatically from request for surgery [id number]      Herniation of intervertebral disc between L5 and S1 03/17/2021     Priority: Medium    CARDIOVASCULAR SCREENING; LDL GOAL LESS THAN 160 09/07/2012     Priority: Medium       Past Surgical History:  Past Surgical History:   Procedure Laterality Date    DISCECTOMY LUMBAR MINIMALLY INVASIVE ONE LEVEL Left 5/19/2021    Procedure: Left L5-S1 minimally invasive microdiscectomy. ;  Surgeon: Filipe Bone MD;  Location:  OR    ORTHOPEDIC SURGERY  2/2016    left wrist     Medications:  Current Outpatient Medications   Medication Sig Dispense Refill    buPROPion (WELLBUTRIN XL) 150 MG 24 hr tablet Take 1 tablet (150 mg) by mouth every morning 90 tablet 1    gabapentin (NEURONTIN) 300 MG capsule Take 1 capsule (300 mg) by mouth At Bedtime 90 capsule 3    ibuprofen (ADVIL/MOTRIN) 200 MG capsule Take 400 mg by mouth every 4 hours as needed for fever      losartan (COZAAR) 50 MG tablet Take 1 tablet (50 mg) by mouth daily 90 tablet 3     Allergies:   No Known Allergies    Social History:  Home situation: lives with wife  Occupation/Schooling: currently not working, previously worked for [employer], 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 Father         MI, 50         Review of Systems:    POSTIVE IN BOLD  GENERAL: fever/chills, fatigue, general unwell feeling, weight gain/loss.  HEAD/EYES:  headache, dizziness, or vision changes.    EARS/NOSE/THROAT:  Nosebleeds, hearing loss, sinus infection, earache, tinnitus.  IMMUNE:  Allergies, cancer, immune deficiency, or infections.  SKIN:  Urticaria, rash, hives  HEME/Lymphatic:   anemia, easy bruising, easy bleeding.  RESPIRATORY:  cough, wheezing, or shortness of breath  CARDIOVASCULAR/Circulation:  Extremity edema, syncope, hypertension, tachycardia, or angina.  GASTROINTESTINAL:  abdominal pain, nausea/emesis, diarrhea, constipation,  hematochezia, or melena.  ENDOCRINE:  Diabetes, steroid use,  thyroid disease or osteoporosis.  MUSCULOSKELETAL: neck pain, back pain, arthralgia, arthritis, or gout.  GENITOURINARY:  frequency, urgency, dysuria, difficulty voiding, hematuria or incontinence.  NEUROLOGIC:  weakness, numbness, paresthesias, seizure, tremor, stroke or memory loss.  PSYCHIATRIC:  depression, anxiety, stress, suicidal thoughts or mood swings.     Physical Exam:  Vitals:    09/14/23 0921   BP: (!) 155/87   BP Location: Left arm   Patient Position: Chair   Cuff Size: Adult Large   Pulse: 103   SpO2: 100%   Weight: 79.8 kg (176 lb)   Height: 1.753 m (5' 9\")     Exam:  Constitutional: healthy, alert, and no distress  Head: normocephalic. Atraumatic.   Eyes: no redness or jaundice noted   ENT: oropharnx normal.  MMM.  Neck supple.    Cardiovascular: RRR no m/g/r   Respiratory: clear   Gastrointestinal: soft, non-tender, normoactive bowel sounds   : deferred  Skin: no suspicious lesions or rashes  Psychiatric: mentation appears normal and affect normal/bright    Musculoskeletal exam:  Gait/Station/Posture: normal  Lumbar " "spine: Normal ROM    Rotation/ext to right: pain free   Rotation/ext to left: pain free    Myofascial tenderness:  negative  Straight leg exam: Positive L > R  Enmanuel's maneuver: negative    Neurologic exam:  CN:  Cranial nerves 2-12 are normal  Motor:  5/5 UE and LE strength  Reflexes:     Patella:  R:  2/4 L: 2/4   Achilles:  R:  2/4 L: 2/4  Other reflexes:  Toes downgoing   Sensory:  (upper and lower extremities):   Light touch: normal    Allodynia: absent    Dysethesia: absent    Hyperalgesia: absent     Diagnostic tests:  MRI of Lumbar spine was completed on 3/10/2021 showing:  \"MRI LUMBAR SPINE WITHOUT CONTRAST   3/10/2021 4:25 PM      HISTORY: Acute left-sided low back pain with left-sided sciatica.  Lumbosacral radiculopathy.     TECHNIQUE: Multiplanar multisequence MRI of the lumbar spine without  contrast.     COMPARISON: X-rays of the lumbar spine 3/10/2021      FINDINGS: Alignment is within normal limits. Bone marrow demonstrates  mild edema is from the L5-S1 disc joint. Conus medullaris and cauda  equina are unremarkable. Conus medullaris terminates at the level of  the L1-L2 disc. No appreciable extraspinal abnormality.     Segmental Analysis:      T12-L1:  Disc height maintained. No herniation. Normal facet joints.  No foraminal or spinal canal stenosis.      L1-L2:  Disc height maintained. No herniation. Normal facet joints. No  foraminal or spinal canal stenosis.       L2-L3:  Disc height maintained. No herniation. Normal facet joints. No  foraminal or spinal canal stenosis.       L3-L4:  Disc height maintained. No herniation. Normal facet joints. No  foraminal or spinal canal stenosis.       L4-L5:  Disc height maintained. No herniation. Mild bilateral facet  arthropathy. No foraminal or spinal canal stenosis.       L5-S1:  Mild to moderate disc height loss. Left central disc extrusion  which contacts and displaces the traversing left S1 nerve root within  the lateral recess. Mild bilateral facet " "arthropathy. Mild right  foraminal stenosis. No left foraminal stenosis. Mild spinal canal  stenosis.                                                                      IMPRESSION: Left central disc extrusion at L5-S1\"    Personally reviewed imaging prior to visit and again with patient during visit    Other testing (labs, diagnostics) reviewed:  Labs  No results found for: A1C  Last Comprehensive Metabolic Panel:  Lab Results   Component Value Date     05/25/2023    POTASSIUM 4.4 05/25/2023    CHLORIDE 103 05/25/2023    CO2 23 05/25/2023    ANIONGAP 11 05/25/2023    GLC 95 05/25/2023    BUN 12.6 05/25/2023    CR 0.89 05/25/2023    GFRESTIMATED >90 05/25/2023    SHANNON 9.8 05/25/2023         [place] Prescription Monitoring Program reviewed - appropriate    Outside records reviewed      Assessment:  Lumbar Radiculopathy    Dalton Tillman is a 42 year old male who presents with the complaints of low back pain that is consistent with lumbar radiculopathy based on his history, physical exam, and image findings. He has already undergone a microdiscectomy but continues to have radicular symptoms radiating down his leg. At this time we discussed a repeat LESI to see if this will help with his symptoms. We also discussed conservative options, including TENS unit and pain psychology. He has completed physical therapy already, and continues to do daily exercises and stretches as learned with PT    Plan:  Diagnosis reviewed, treatment option addressed, and risk/benefits discussed.  Self-care instructions given.  I am recommending a multidisciplinary treatment plan to help this patient better manage his pain.      Physical Therapy: continue current regimen  Pain Psychologist to address issues of relaxation, behavioral change, coping style, and other factors important to improvement: Referral placed  Diagnostic Studies: none  Medication Management: none  Further procedures recommended: Lumbosacral Epidural Steroid " Injection  Other treatments: TENS unit   Recommendations/follow-up for PCP:  none  Follow up: 6 weeks after procedure    Total time spent was 40 minutes, and more than 50% of face to face time was spent in counseling and/or coordination of care regarding principles of multidisciplinary care, medication management, and therapeutic options.     Carolina Li MD    Pain Medicine  Department of Anesthesiology  [facility]      Answers submitted by the patient for this visit:  Symptoms you have experienced in the last 30 days (Submitted on 9/13/2023)  General Symptoms: No  Skin Symptoms: No  HENT Symptoms: No  EYE SYMPTOMS: No  HEART SYMPTOMS: No  LUNG SYMPTOMS: No  INTESTINAL SYMPTOMS: No  URINARY SYMPTOMS: No  REPRODUCTIVE SYMPTOMS: No  SKELETAL SYMPTOMS: Yes  BLOOD SYMPTOMS: No  NERVOUS SYSTEM SYMPTOMS: No  MENTAL HEALTH SYMPTOMS: No  Please answer the questions below to tell us what condition you are experiencing: (Submitted on 9/13/2023)  Back pain: Yes  Muscle aches: No  Neck pain: No  Swollen joints: No  Joint pain: No  Bone pain: No  Muscle cramps: No  Muscle weakness: No  Joint stiffness: No  Bone fracture: No      Again, thank you for allowing me to participate in the care of your patient.      Sincerely,    Carolina Li MD

## 2023-09-18 ENCOUNTER — MYC MEDICAL ADVICE (OUTPATIENT)
Dept: FAMILY MEDICINE | Facility: CLINIC | Age: 43
End: 2023-09-18
Payer: COMMERCIAL

## 2023-09-18 DIAGNOSIS — I10 ESSENTIAL HYPERTENSION, BENIGN: Primary | ICD-10-CM

## 2023-09-18 DIAGNOSIS — M54.16 LUMBAR RADICULOPATHY: ICD-10-CM

## 2023-09-18 RX ORDER — LOSARTAN POTASSIUM 100 MG/1
100 TABLET ORAL DAILY
Qty: 90 TABLET | Refills: 3 | Status: SHIPPED | OUTPATIENT
Start: 2023-09-18 | End: 2024-09-12

## 2023-09-18 RX ORDER — GABAPENTIN 300 MG/1
300 CAPSULE ORAL 3 TIMES DAILY
Qty: 270 CAPSULE | Refills: 3 | Status: SHIPPED | OUTPATIENT
Start: 2023-09-18

## 2023-10-19 ENCOUNTER — ANCILLARY PROCEDURE (OUTPATIENT)
Dept: RADIOLOGY | Facility: AMBULATORY SURGERY CENTER | Age: 43
End: 2023-10-19
Attending: ANESTHESIOLOGY
Payer: COMMERCIAL

## 2023-10-19 ENCOUNTER — HOSPITAL ENCOUNTER (OUTPATIENT)
Facility: AMBULATORY SURGERY CENTER | Age: 43
Discharge: HOME | End: 2023-10-19
Attending: ANESTHESIOLOGY | Admitting: ANESTHESIOLOGY
Payer: COMMERCIAL

## 2023-10-19 VITALS
OXYGEN SATURATION: 98 % | DIASTOLIC BLOOD PRESSURE: 84 MMHG | HEART RATE: 88 BPM | SYSTOLIC BLOOD PRESSURE: 145 MMHG | RESPIRATION RATE: 11 BRPM

## 2023-10-19 DIAGNOSIS — M47.817 SPONDYLOSIS OF LUMBOSACRAL REGION WITHOUT MYELOPATHY OR RADICULOPATHY: ICD-10-CM

## 2023-10-19 PROCEDURE — 62323 NJX INTERLAMINAR LMBR/SAC: CPT

## 2023-10-19 RX ORDER — IOPAMIDOL 408 MG/ML
INJECTION, SOLUTION INTRATHECAL DAILY PRN
Status: DISCONTINUED | OUTPATIENT
Start: 2023-10-19 | End: 2023-10-19 | Stop reason: HOSPADM

## 2023-10-19 RX ORDER — BUPIVACAINE HYDROCHLORIDE 2.5 MG/ML
INJECTION, SOLUTION EPIDURAL; INFILTRATION; INTRACAUDAL DAILY PRN
Status: DISCONTINUED | OUTPATIENT
Start: 2023-10-19 | End: 2023-10-19 | Stop reason: HOSPADM

## 2023-10-19 RX ORDER — LIDOCAINE HYDROCHLORIDE 10 MG/ML
INJECTION, SOLUTION EPIDURAL; INFILTRATION; INTRACAUDAL; PERINEURAL DAILY PRN
Status: DISCONTINUED | OUTPATIENT
Start: 2023-10-19 | End: 2023-10-19 | Stop reason: HOSPADM

## 2023-10-19 RX ORDER — METHYLPREDNISOLONE ACETATE 40 MG/ML
INJECTION, SUSPENSION INTRA-ARTICULAR; INTRALESIONAL; INTRAMUSCULAR; SOFT TISSUE DAILY PRN
Status: DISCONTINUED | OUTPATIENT
Start: 2023-10-19 | End: 2023-10-19 | Stop reason: HOSPADM

## 2023-10-19 NOTE — DISCHARGE INSTRUCTIONS
Home Care Instructions after an Epidural Steroid Pain Injection  A lumbar epidural steroid injection delivers steroid medication directly into the area that may be causing your lower back pain and/or leg pain. A cervical or thoracic epidural steroid injection delivers steroids into the epidural space surrounding spinal nerve roots to help relieve pain in the upper spine/neck.  Activity  -Rest today  -Do not work today  -Resume normal activity tomorrow  -DO NOT shower for 24 hours  -DO NOT remove bandaid for 24 hours  Pain  -You may experience soreness at the injection site for one or two days  -You may use an ice pack for 20 minutes every 2 hours for the first 24 hours  -You may use a heating pad after the first 24 hours  -You may use Tylenol (acetaminophen) every 4 hours or other pain medicines as     directed by your physician    You may experience numbness radiating into your legs or arms (depending on the procedure location). This numbness may last several hours. Until sensation returns to normal; please use caution in walking, climbing stairs, and stepping out of your vehicle, etc.    Common side effects of steroids:  Not everyone will experience corticosteroid side effects. If side effects are experienced, they will gradually subside in the 7-10 day period following an injection. Most common side effects include:  -Flushed face and/or chest  -Feeling of warmth, particularly in the face but could be an overall feeling of warmth  -Increased blood sugar in diabetic patients  -Menstrual irregularities my occur. If taking hormone-based birth control an alternate method of birth control is recommended  -Sleep disturbances and/or mood swings are possible  -Leg cramps    Please contact us if you have:  -Severe pain  -Fever more than 101.5 degrees Fahrenheit  -Signs of infection at the injection site (redness, swelling, or drainage)    FOR PAIN CENTER PATIENTS:  If you have questions, please contact the Pain Clinic at  [phone] Option #1 between the hours of 7:00 am and 3:00 pm Monday through Friday. After office hours you can contact the on call provider by dialing [phone]. If you need immediate attention, we recommend that you go to a hospital emergency room or dial [phone].

## 2023-10-27 NOTE — H&P
ABBREVIATED H&P [facility]      Patient Name: Dalton Tillman   MRN: [id number]   YOB: 1980     1. Reason for Procedure:  Procedure Summary       Date: 10/19/23 Room / Location: [facility] PROCEDURE ROOM 06 / [facility]    Anesthesia Start:  Anesthesia Stop:     Procedure: Lumbosacral Epidural Steroid Injection (Spine) Diagnosis:       Lumbosacral radiculopathy      (Lumbosacral radiculopathy [M54.17])    Providers: Carolina Li MD Responsible Provider:     Anesthesia Type: Not recorded ASA Status: Not recorded            2. History:   Past Medical History:   Diagnosis Date    Hypertension        Comorbidities: None    Any history of sleep apnea? No    Any history of problems with sedation? No    3. Physical:    General: Normal  Skin:  Normal.  Respiratory: Clear to auscultation bilateral, no wheezing  Cardio:  Regular rate and rhythm  Abdomen: Soft, nontender, nondistended, no palpable masses.  Musculoskeletal: Normal  Neuro: Sensory exam normal, motor exam 5/5, bilateral upper and lower extremities         4. Current Medications (if not in Epic):   Current Outpatient Medications   Medication Sig Dispense Refill    buPROPion (WELLBUTRIN XL) 150 MG 24 hr tablet Take 1 tablet (150 mg) by mouth every morning 90 tablet 1    gabapentin (NEURONTIN) 300 MG capsule Take 1 capsule (300 mg) by mouth 3 times daily 270 capsule 3    ibuprofen (ADVIL/MOTRIN) 200 MG capsule Take 400 mg by mouth every 4 hours as needed for fever      losartan (COZAAR) 100 MG tablet Take 1 tablet (100 mg) by mouth daily 90 tablet 3        5. Allergies and Reactions:  has No Known Allergies.

## 2023-10-27 NOTE — OP NOTE
Patient: Dalton Tillman Age: 42 year old   MRN: [id number] Attending: Dr. Li     Date of Visit: October 19, 2023      PAIN MEDICINE CLINIC PROCEDURE NOTE    ATTENDING CLINICIAN:    Carolina Li MD    ASSISTANT CLINICIAN:      PREPROCEDURE DIAGNOSES:  1.  Lumbar radiculopathy  2.  Chronic low back pain       PROCEDURE(S) PERFORMED:  1.  Interlaminar lumbar epidural steroid injection   2.  Fluoroscopic guidance for the above-named procedure(s)      ANESTHESIA:  Local.    INDICATIONS:  Dalton Tillman is a 42 year old male with a history of  chronic low back pain secondary to left  lumbosacral radiculopathy .  The patient stated that the patient was in their usual state of health and denied recent anticoagulant use or recent infections.  Therefore, the plan is to perform above mentioned procedures.     Procedure Details:  The patient was met in the procedure room, where the patient was identified by name, medical record number and date of birth.  All of the patient s last minute questions were answered. Written informed consent was obtained and saved in the electronic medical record, after the risks, benefits, and alternatives were discussed with the patient.      A formal time-out procedure was performed, as per protocol, including patient name, title of procedure, and site of procedure, and all in the room concurred.  Routine monitors were applied.      The patient was placed in the prone position on the procedure room table.  All pressure points were checked and comfortably padded.  Routine monitors were placed.  Vital signs were stable.    A chlorhexidine prep was completed followed by sterile draping per standard procedure.     The AP fluoroscopic view was optimized for approach at  L4-5 interspace.  The skin over the interspace was infiltrated with 4-5 mL of 1% Lidocaine using a 25 gauge, 1.5 inch needle.  A 20-gauge 3-1/2 inch Tuohy needle was advanced under fluoroscopic guidance with left paramedial  approach until it touched lamina. Mutiple AP and lateral fluoroscopic images at this time  are taken as Tuohy needle was advanced to the epidural space. The epidural space was identified, without evidence of blood, cerebrospinal fluid, or parasthesia throughout. Needle tip placement within the epidural space was further confirmed with 1-2 mL of nonionic contrast agent, with the epidural space visualized in the AP and lateral fluoroscopic view(s) with appropriate spread of the agent with fat vacuolization and no intravascular or intrathecal spread noted. Next, 6 mL of a treatment solution containing 2 mL of preservative free 0.25% bupivacaine, 1 mL of Depo-Medrol 40 mg/mL and 3 mL preservative free normal saline was administered slowly. The needle was withdrawn.      Light pressure was held at the puncture site(s) to prevent ecchymosis and oozing.  The patient's skin was cleansed, and hemostasis was confirmed.  Band-aids were applied to the needle injection site(s).      Condition:    The patient remained awake and alert throughout the procedure.  The patient tolerated the procedure well and was monitored for approximately 15 minutes afterward in the post procedure area.  There were no immediate post procedure complications noted.  The patient was then discharged to home as per protocol.      Pre-procedure pain score: 4/10  Post-procedure pain score: 3/10

## 2023-11-20 DIAGNOSIS — F33.1 MODERATE EPISODE OF RECURRENT MAJOR DEPRESSIVE DISORDER (H): ICD-10-CM

## 2023-11-20 RX ORDER — BUPROPION HYDROCHLORIDE 150 MG/1
150 TABLET ORAL EVERY MORNING
Qty: 90 TABLET | Refills: 0 | Status: SHIPPED | OUTPATIENT
Start: 2023-11-20 | End: 2024-02-22

## 2024-02-22 DIAGNOSIS — F33.1 MODERATE EPISODE OF RECURRENT MAJOR DEPRESSIVE DISORDER (H): ICD-10-CM

## 2024-02-22 RX ORDER — BUPROPION HYDROCHLORIDE 150 MG/1
150 TABLET ORAL EVERY MORNING
Qty: 90 TABLET | Refills: 0 | Status: SHIPPED | OUTPATIENT
Start: 2024-02-22 | End: 2024-05-28

## 2024-04-25 ENCOUNTER — PATIENT OUTREACH (OUTPATIENT)
Dept: CARE COORDINATION | Facility: CLINIC | Age: 44
End: 2024-04-25
Payer: COMMERCIAL

## 2024-05-28 DIAGNOSIS — F33.1 MODERATE EPISODE OF RECURRENT MAJOR DEPRESSIVE DISORDER (H): ICD-10-CM

## 2024-05-29 RX ORDER — BUPROPION HYDROCHLORIDE 150 MG/1
150 TABLET ORAL EVERY MORNING
Qty: 90 TABLET | Refills: 0 | Status: SHIPPED | OUTPATIENT
Start: 2024-05-29 | End: 2024-06-10

## 2024-06-05 SDOH — HEALTH STABILITY: PHYSICAL HEALTH: ON AVERAGE, HOW MANY DAYS PER WEEK DO YOU ENGAGE IN MODERATE TO STRENUOUS EXERCISE (LIKE A BRISK WALK)?: 4 DAYS

## 2024-06-05 SDOH — HEALTH STABILITY: PHYSICAL HEALTH: ON AVERAGE, HOW MANY MINUTES DO YOU ENGAGE IN EXERCISE AT THIS LEVEL?: 60 MIN

## 2024-06-05 ASSESSMENT — SOCIAL DETERMINANTS OF HEALTH (SDOH): HOW OFTEN DO YOU GET TOGETHER WITH FRIENDS OR RELATIVES?: TWICE A WEEK

## 2024-06-10 ENCOUNTER — OFFICE VISIT (OUTPATIENT)
Dept: FAMILY MEDICINE | Facility: CLINIC | Age: 44
End: 2024-06-10
Payer: COMMERCIAL

## 2024-06-10 VITALS
RESPIRATION RATE: 14 BRPM | OXYGEN SATURATION: 96 % | HEART RATE: 65 BPM | DIASTOLIC BLOOD PRESSURE: 84 MMHG | HEIGHT: 67 IN | BODY MASS INDEX: 29.35 KG/M2 | TEMPERATURE: 97.1 F | SYSTOLIC BLOOD PRESSURE: 138 MMHG | WEIGHT: 187 LBS

## 2024-06-10 DIAGNOSIS — M54.50 CHRONIC LEFT-SIDED LOW BACK PAIN, UNSPECIFIED WHETHER SCIATICA PRESENT: ICD-10-CM

## 2024-06-10 DIAGNOSIS — M54.16 LUMBAR RADICULOPATHY: ICD-10-CM

## 2024-06-10 DIAGNOSIS — I10 ESSENTIAL HYPERTENSION, BENIGN: ICD-10-CM

## 2024-06-10 DIAGNOSIS — F33.1 MODERATE EPISODE OF RECURRENT MAJOR DEPRESSIVE DISORDER (H): ICD-10-CM

## 2024-06-10 DIAGNOSIS — Z98.890 S/P LUMBAR MICRODISCECTOMY: ICD-10-CM

## 2024-06-10 DIAGNOSIS — Z13.220 SCREENING CHOLESTEROL LEVEL: ICD-10-CM

## 2024-06-10 DIAGNOSIS — G89.29 CHRONIC LEFT-SIDED LOW BACK PAIN, UNSPECIFIED WHETHER SCIATICA PRESENT: ICD-10-CM

## 2024-06-10 DIAGNOSIS — Z00.00 ROUTINE GENERAL MEDICAL EXAMINATION AT A HEALTH CARE FACILITY: Primary | ICD-10-CM

## 2024-06-10 PROCEDURE — 99213 OFFICE O/P EST LOW 20 MIN: CPT | Mod: 25 | Performed by: FAMILY MEDICINE

## 2024-06-10 PROCEDURE — 99396 PREV VISIT EST AGE 40-64: CPT | Mod: 25 | Performed by: FAMILY MEDICINE

## 2024-06-10 PROCEDURE — 90471 IMMUNIZATION ADMIN: CPT | Performed by: FAMILY MEDICINE

## 2024-06-10 PROCEDURE — 80053 COMPREHEN METABOLIC PANEL: CPT | Performed by: FAMILY MEDICINE

## 2024-06-10 PROCEDURE — 80061 LIPID PANEL: CPT | Performed by: FAMILY MEDICINE

## 2024-06-10 PROCEDURE — 90746 HEPB VACCINE 3 DOSE ADULT IM: CPT | Performed by: FAMILY MEDICINE

## 2024-06-10 PROCEDURE — 36415 COLL VENOUS BLD VENIPUNCTURE: CPT | Performed by: FAMILY MEDICINE

## 2024-06-10 RX ORDER — BUPROPION HYDROCHLORIDE 100 MG/1
100 TABLET ORAL 2 TIMES DAILY
Qty: 60 TABLET | Refills: 2 | Status: SHIPPED | OUTPATIENT
Start: 2024-06-10 | End: 2024-09-16

## 2024-06-10 ASSESSMENT — ANXIETY QUESTIONNAIRES
4. TROUBLE RELAXING: NOT AT ALL
GAD7 TOTAL SCORE: 0
1. FEELING NERVOUS, ANXIOUS, OR ON EDGE: NOT AT ALL
7. FEELING AFRAID AS IF SOMETHING AWFUL MIGHT HAPPEN: NOT AT ALL
7. FEELING AFRAID AS IF SOMETHING AWFUL MIGHT HAPPEN: NOT AT ALL
3. WORRYING TOO MUCH ABOUT DIFFERENT THINGS: NOT AT ALL
GAD7 TOTAL SCORE: 0
GAD7 TOTAL SCORE: 0
5. BEING SO RESTLESS THAT IT IS HARD TO SIT STILL: NOT AT ALL
2. NOT BEING ABLE TO STOP OR CONTROL WORRYING: NOT AT ALL
6. BECOMING EASILY ANNOYED OR IRRITABLE: NOT AT ALL

## 2024-06-10 ASSESSMENT — PAIN SCALES - GENERAL: PAINLEVEL: NO PAIN (0)

## 2024-06-10 ASSESSMENT — PATIENT HEALTH QUESTIONNAIRE - PHQ9
SUM OF ALL RESPONSES TO PHQ QUESTIONS 1-9: 0
SUM OF ALL RESPONSES TO PHQ QUESTIONS 1-9: 0

## 2024-06-10 NOTE — NURSING NOTE
Prior to immunization administration, verified patients identity using patient s name and date of birth. Please see Immunization Activity for additional information.     Screening Questionnaire for Adult Immunization    Are you sick today?   No   Do you have allergies to medications, food, a vaccine component or latex?   No   Have you ever had a serious reaction after receiving a vaccination?   No   Do you have a long-term health problem with heart, lung, kidney, or metabolic disease (e.g., diabetes), asthma, a blood disorder, no spleen, complement component deficiency, a cochlear implant, or a spinal fluid leak?  Are you on long-term aspirin therapy?   No   Do you have cancer, leukemia, HIV/AIDS, or any other immune system problem?   No   Do you have a parent, brother, or sister with an immune system problem?   No   In the past 3 months, have you taken medications that affect  your immune system, such as prednisone, other steroids, or anticancer drugs; drugs for the treatment of rheumatoid arthritis, Crohn s disease, or psoriasis; or have you had radiation treatments?   No   Have you had a seizure, or a brain or other nervous system problem?   No   During the past year, have you received a transfusion of blood or blood    products, or been given immune (gamma) globulin or antiviral drug?   No   For women: Are you pregnant or is there a chance you could become       pregnant during the next month?   No   Have you received any vaccinations in the past 4 weeks?   No     Immunization questionnaire answers were all negative.        Patient instructed to remain in clinic for 15 minutes afterwards, and to report any adverse reactions.     Screening performed by Ed Sellers MA on 6/10/2024 at 4:25 PM.

## 2024-06-10 NOTE — PROGRESS NOTES
"Pre[facility] Care Visit  [facility]  Kp Duenas DO, Family Medicine  Jaquan 10, 2024      Assessment & Plan     Routine general medical examination at a health care facility  I encouraged him to keep exercising and eating healthy foods.  We are going to check nonfasting labs as listed below.    Essential hypertension, benign  Blood pressure stable on losartan 100 mg daily.  - Comprehensive metabolic panel (BMP + Alb, Alk Phos, ALT, AST, Total. Bili, TP); Future    Moderate episode of recurrent major depressive disorder (H)  He is interested in weaning off Wellbutrin.  I sent in a prescription for the 100 mg dose heat.  He is going to take this twice daily for 2 weeks, then once daily for 2 weeks then half tablet for 1-2 weeks before stopping.  - buPROPion (WELLBUTRIN) 100 MG tablet; Take 1 tablet (100 mg) by mouth 2 times daily    Lumbar radiculopathy  This issue is stable ever since his microdiscectomy surgery in 2021.    Chronic left-sided low back pain, unspecified whether sciatica present  This issue is stable.    S/P lumbar microdiscectomy    Screening cholesterol level  - Lipid Profile (Chol, Trig, HDL, LDL calc); Future    Patient has been advised of split billing requirements and indicates understanding: Yes        BMI  Estimated body mass index is 29.29 kg/m  as calculated from the following:    Height as of this encounter: 1.702 m (5' 7\").    Weight as of this encounter: 84.8 kg (187 lb).   Weight management plan: Discussed healthy diet and exercise guidelines    Counseling  Appropriate preventive services were discussed with this patient, including applicable screening as appropriate for fall prevention, nutrition, physical activity, Tobacco-use cessation, weight loss and cognition.  Checklist reviewing preventive services available has been given to the patient.  Reviewed patient's diet, addressing concerns and/or questions.   The patient reports drinking more than 3 " alcoholic drinks per day and/or more than 7 drhnks per week. The patient was counseled and given information about possible harmful effects of excessive alcohol intake.        Keisha Hoffman is a 43 year old, presenting for the following:  Physical        6/10/2024     3:39 PM   Additional Questions   Roomed by Ned PHAM        Answers submitted by the patient for this visit:  Patient Health Questionnaire (Submitted on 6/10/2024)  PHQ9 TOTAL SCORE: 0  DEISI-7 (Submitted on 6/10/2024)  DEISI 7 TOTAL SCORE: 0    HPI    He has a medical history significant for hypertension, depression, genital warts and chronic low back pain s/p L5-S1 microdiscectomy on 5/19/2021.    Social history: .              6/5/2024   General Health   How would you rate your overall physical health? Good   Feel stress (tense, anxious, or unable to sleep) Not at all         6/5/2024   Nutrition   Three or more servings of calcium each day? Yes   Diet: Other   If other, please elaborate: Mediterranean diet. Try to limit sweets and junk food, eat mostly fruits, veggies, whole grains, healthy fats, small protein portions.   How many servings of fruit and vegetables per day? 4 or more   How many sweetened beverages each day? 0-1         6/5/2024   Exercise   Days per week of moderate/strenous exercise 4 days   Average minutes spent exercising at this level 60 min         6/5/2024   Social Factors   Frequency of gathering with friends or relatives Twice a week   Worry food won't last until get money to buy more No   Food not last or not have enough money for food? No   Do you have housing?  Yes   Are you worried about losing your housing? No   Lack of transportation? No   Unable to get utilities (heat,electricity)? No         6/5/2024   Dental   Dentist two times every year? Yes         6/5/2024   TB Screening   Were you born outside of the [place]? No       Today's PHQ-9 Score:       6/10/2024     2:24 PM   PHQ-9 SCORE   PHQ-9 Total Score MyChart 0    PHQ-9 Total Score 0         2024   Substance Use   Alcohol more than 3/day or more than 7/wk Yes   How often do you have a drink containing alcohol 2 to 3 times a week   How many alcohol drinks on typical day 3 or 4   How often do you have 5+ drinks at one occasion Monthly   Audit 2/3 Score 3   How often not able to stop drinking once started Never   How often failed to do what normally expected Never   How often needed first drink in am after a heavy drinking session Never   How often feeling of guilt or remorse after drinking Never   How often unable to remember what happened the night before Never   Have you or someone else been injured because of your drinking Yes, but not in the last year   Has anyone been concerned or suggested you cut down on drinking No   TOTAL SCORE - AUDIT 8   Do you use any other substances recreationally? (!) ALCOHOL    (!) CANNABIS PRODUCTS     Social History     Tobacco Use    Smoking status: Former     Current packs/day: 0.00     Types: Cigarettes     Start date: 10/15/2016     Quit date: 10/15/2016     Years since quittin.6    Smokeless tobacco: Never   Vaping Use    Vaping status: Never Used   Substance Use Topics    Alcohol use: Yes     Comment: Socailly    Drug use: Yes     Types: Marijuana     Comment: Marijuana           2024   STI Screening   New sexual partner(s) since last STI/HIV test? No   ASCVD Risk   The 10-year ASCVD risk score (Rosalia MOYA, et al., 2019) is: 1.5%    Values used to calculate the score:      Age: 43 years      Sex: Male      Is Non- : No      Diabetic: No      Tobacco smoker: No      Systolic Blood Pressure: 138 mmHg      Is BP treated: Yes      HDL Cholesterol: 65 mg/dL      Total Cholesterol: 216 mg/dL        2024   Contraception/Family Planning   Questions about contraception or family planning No        Reviewed and updated as needed this visit by Provider     Meds                      Review of  "Systems  Constitutional, HEENT, cardiovascular, pulmonary, GI, , musculoskeletal, neuro, skin, endocrine and psych systems are negative, except as otherwise noted.     Objective    Exam  /84   Pulse 65   Temp 97.1  F (36.2  C) (Temporal)   Resp 14   Ht 1.702 m (5' 7\")   Wt 84.8 kg (187 lb)   SpO2 96%   BMI 29.29 kg/m     Estimated body mass index is 29.29 kg/m  as calculated from the following:    Height as of this encounter: 1.702 m (5' 7\").    Weight as of this encounter: 84.8 kg (187 lb).    Physical Exam  GENERAL: alert and no distress  EYES: Eyes grossly normal to inspection, PERRL and conjunctivae and sclerae normal  HENT: ear canals and TM's normal, nose and mouth without ulcers or lesions  NECK: no adenopathy, no asymmetry, masses, or scars  RESP: lungs clear to auscultation - no rales, rhonchi or wheezes  CV: regular rate and rhythm, normal S1 S2, no S3 or S4, no murmur, click or rub, no peripheral edema  ABDOMEN: soft, nontender, no hepatosplenomegaly, no masses and bowel sounds normal  MS: no gross musculoskeletal defects noted, no edema  SKIN: no suspicious lesions or rashes  NEURO: Normal strength and tone, mentation intact and speech normal  PSYCH: mentation appears normal, affect normal/bright        Signed Electronically by: Kp Duenas, DO    "

## 2024-06-11 LAB
ALBUMIN SERPL BCG-MCNC: 4.9 G/DL (ref 3.5–5.2)
ALP SERPL-CCNC: 82 U/L (ref 40–150)
ALT SERPL W P-5'-P-CCNC: 42 U/L (ref 0–70)
ANION GAP SERPL CALCULATED.3IONS-SCNC: 14 MMOL/L (ref 7–15)
AST SERPL W P-5'-P-CCNC: 31 U/L (ref 0–45)
BILIRUB SERPL-MCNC: 0.4 MG/DL
BUN SERPL-MCNC: 12 MG/DL (ref 6–20)
CALCIUM SERPL-MCNC: 9.7 MG/DL (ref 8.6–10)
CHLORIDE SERPL-SCNC: 105 MMOL/L (ref 98–107)
CHOLEST SERPL-MCNC: 230 MG/DL
CREAT SERPL-MCNC: 0.88 MG/DL (ref 0.67–1.17)
DEPRECATED HCO3 PLAS-SCNC: 23 MMOL/L (ref 22–29)
EGFRCR SERPLBLD CKD-EPI 2021: >90 ML/MIN/1.73M2
FASTING STATUS PATIENT QL REPORTED: NO
FASTING STATUS PATIENT QL REPORTED: NO
GLUCOSE SERPL-MCNC: 91 MG/DL (ref 70–99)
HDLC SERPL-MCNC: 61 MG/DL
LDLC SERPL CALC-MCNC: 139 MG/DL
NONHDLC SERPL-MCNC: 169 MG/DL
POTASSIUM SERPL-SCNC: 3.9 MMOL/L (ref 3.4–5.3)
PROT SERPL-MCNC: 7.3 G/DL (ref 6.4–8.3)
SODIUM SERPL-SCNC: 142 MMOL/L (ref 135–145)
TRIGL SERPL-MCNC: 150 MG/DL

## 2024-09-12 DIAGNOSIS — I10 ESSENTIAL HYPERTENSION, BENIGN: ICD-10-CM

## 2024-09-12 RX ORDER — LOSARTAN POTASSIUM 100 MG/1
100 TABLET ORAL DAILY
Qty: 90 TABLET | Refills: 0 | Status: SHIPPED | OUTPATIENT
Start: 2024-09-12

## 2024-09-16 DIAGNOSIS — F33.1 MODERATE EPISODE OF RECURRENT MAJOR DEPRESSIVE DISORDER (H): ICD-10-CM

## 2024-09-16 RX ORDER — BUPROPION HYDROCHLORIDE 100 MG/1
100 TABLET ORAL 2 TIMES DAILY
Qty: 60 TABLET | Refills: 1 | Status: SHIPPED | OUTPATIENT
Start: 2024-09-16

## 2024-10-10 NOTE — TELEPHONE ENCOUNTER
"  Reason for Disposition    [1] All other patients AND [2] now alert and feels fine(Exception: SIMPLE FAINT due to stress, pain, prolonged standing, or suddenly standing)    Additional Information    Negative: [1] Known diabetic AND [2] fainting from low blood sugar (i.e., < 70 mg/dl or 3.9 mmol/l)    Negative: Seizure suspected (e.g., muscle jerking or shaking followed by confusion)    Negative: Heat exhaustion suspected    Negative: Still unconscious    Negative: Difficult to awaken or acting confused  (e.g., disoriented, slurred speech)    Negative: Shock suspected (e.g., cold/pale/clammy skin, too weak to stand, low BP, rapid pulse)    Negative: Difficulty breathing    Negative: Bluish lips, tongue, or face now    Negative: Chest pain    Negative: Extra heart beats or heart is beating fast  (i.e.,\"palpitations\")    Negative: Bleeding (e.g., vomiting blood, rectal bleeding or tarry stools, severe vaginal bleeding)(Exception: fainted from sight of small amount of blood; small cut or abrasion)    Negative: Fainted suddenly after medicine, allergic food or bee sting    Negative: Age > 50 years (Exception: occurred > 1 hour ago AND now feels completely fine)    Negative: History of heart problems (e.g., congestive heart failure, heart attack)    Negative: [1] Fainted > 15 minutes ago AND [2] still feels too weak or dizzy to stand    Negative: Sounds like a life-threatening emergency to the triager    Negative: [1] Fainted > 15 minutes ago AND [2] still looks pale (pale skin, pallor)    Negative: [1] Fainted > 15 minutes ago AND [2] still feels weak or dizzy    Negative: Occurred during exercise    Negative: Any head or face injury    Negative: Pregnant or possibly pregnant    Negative: Fainted 2 times in one day    Negative: [1] Drinking very little AND [2] dehydration suspected (e.g., no urine > 12 hours, very dry mouth, very lightheaded)    Negative: [1] Age > 50 years  AND [2] now alert and feels fine    Negative: " Patient: Kael Zepeda    Procedure Information       Date/Time: 10/10/24 1145    Procedure: Amputation Left 3rd Toe (Left: Foot)    Location: [facility] / [facility]    Surgeons: William Raya DPM            Relevant Problems   Anesthesia (within normal limits)      Cardiac   (+) Benign essential hypertension   (+) CAD in native artery   (+) Hyperlipidemia, mixed   (+) NSTEMI (non-ST elevated myocardial infarction) (Multi)      Pulmonary  Probable MARIAELENA. Getting sleep study later this month   (+) Pulmonary emphysema (Multi)      Neuro   (+) Depression   (+) Generalized anxiety disorder   (+) Lumbar radiculopathy   (+) Peripheral neuropathy      GI   (+) Gastroesophageal reflux disease without esophagitis      Liver   (+) Hepatitis C      Endocrine   (+) Diabetes mellitus, type 2 (Multi)   (+) Hypothyroidism, acquired      Hematology   (+) Microcytic anemia      Musculoskeletal   (+) Degeneration of intervertebral disc of lumbar region   (+) Fibromyalgia   (+) Lumbar spondylosis   (+) Rheumatoid arthritis involving both hands with positive rheumatoid factor (Multi)   (+) Spinal stenosis of lumbosacral region      ID   (+) Acute osteomyelitis of left foot (Multi)   (+) Hepatitis C   (+) Osteomyelitis of left foot, unspecified type (Multi)       Clinical information reviewed:    Allergies  Meds  Problems              NPO Detail:  NPO/Void Status  Date of Last Liquid: 10/10/24  Time of Last Liquid: 0900 (sips with meds)  Date of Last Solid: 10/08/24         Physical Exam    Airway  Mallampati: II  TM distance: >3 FB  Neck ROM: full     Cardiovascular - normal exam     Dental    Pulmonary - normal exam  (+) decreased breath sounds     Abdominal - normal exam           Anesthesia Plan    History of general anesthesia?: yes  History of complications of general anesthesia?: no    ASA 3     MAC     The patient is not a current smoker.  Patient was previously instructed to abstain from smoking on day of  Patient sounds very sick or weak to the triager    Protocols used: FAINTING-ADULT-AH     procedure.  Patient did not smoke on day of procedure.  Education provided regarding risk of obstructive sleep apnea.  Anesthetic plan and risks discussed with patient.  Use of blood products discussed with patient who.    Plan discussed with attending.

## 2024-11-03 ENCOUNTER — ANCILLARY PROCEDURE (OUTPATIENT)
Dept: GENERAL RADIOLOGY | Facility: CLINIC | Age: 44
End: 2024-11-03
Attending: PHYSICIAN ASSISTANT
Payer: COMMERCIAL

## 2024-11-03 ENCOUNTER — OFFICE VISIT (OUTPATIENT)
Dept: FAMILY MEDICINE | Facility: CLINIC | Age: 44
End: 2024-11-03
Payer: COMMERCIAL

## 2024-11-03 VITALS
DIASTOLIC BLOOD PRESSURE: 88 MMHG | RESPIRATION RATE: 18 BRPM | HEART RATE: 84 BPM | SYSTOLIC BLOOD PRESSURE: 141 MMHG | BODY MASS INDEX: 29.76 KG/M2 | OXYGEN SATURATION: 97 % | TEMPERATURE: 98.5 F | WEIGHT: 190 LBS

## 2024-11-03 DIAGNOSIS — J06.9 UPPER RESPIRATORY TRACT INFECTION, UNSPECIFIED TYPE: ICD-10-CM

## 2024-11-03 DIAGNOSIS — R05.1 ACUTE COUGH: Primary | ICD-10-CM

## 2024-11-03 PROCEDURE — 99213 OFFICE O/P EST LOW 20 MIN: CPT

## 2024-11-03 PROCEDURE — 87635 SARS-COV-2 COVID-19 AMP PRB: CPT

## 2024-11-03 PROCEDURE — 71046 X-RAY EXAM CHEST 2 VIEWS: CPT | Mod: TC | Performed by: RADIOLOGY

## 2024-11-03 RX ORDER — BENZONATATE 100 MG/1
100 CAPSULE ORAL 3 TIMES DAILY PRN
Qty: 30 CAPSULE | Refills: 0 | Status: SHIPPED | OUTPATIENT
Start: 2024-11-03

## 2024-11-03 ASSESSMENT — ENCOUNTER SYMPTOMS
COUGH: 1
SHORTNESS OF BREATH: 1

## 2024-11-03 NOTE — PATIENT INSTRUCTIONS
For productive cough I suggest using over the counter medications such as guaifenesin (Mucinex). Mucinex will reduce the viscosity of mucus secretions and help with productive cough. Drink plenty of fluids while on Mucinex.   You can also take the cough suppressant I prescribed, tessalon perles at night to suppress the cough. Follow up in clinic if symptoms persist or worsen. This usually can last 7-10 days.

## 2024-11-03 NOTE — PROGRESS NOTES
Assessment & Plan      1. Acute cough (Primary)    -Lungs CTAB  -Viral cough  - Symptomatic COVID-19 Virus (Coronavirus) by PCR Nasopharyngeal  - XR Chest 2 Views  - benzonatate (TESSALON) 100 MG capsule; Take 1 capsule (100 mg) by mouth 3 times daily as needed for cough.  Dispense: 30 capsule; Refill: 0    2. Upper respiratory tract infection, unspecified type    -Supportive care recommended (rest, adequate fluid intake and analgesics such as acetaminophen and ibuprofen)      Results for orders placed or performed in visit on 11/03/24   XR Chest 2 Views     Status: None    Narrative    EXAM: XR CHEST 2 VIEWS  LOCATION: [facility] [place] [facility]  DATE: 11/3/2024    INDICATION:  Acute cough  COMPARISON: None.      Impression    IMPRESSION: Lungs are clear. No pleural effusion. Heart size and pulmonary vascularity within normal limits.         Patient Instructions   For productive cough I suggest using over the counter medications such as guaifenesin (Mucinex). Mucinex will reduce the viscosity of mucus secretions and help with productive cough. Drink plenty of fluids while on Mucinex.   You can also take the cough suppressant I prescribed, tessalon perles at night to suppress the cough. Follow up in clinic if symptoms persist or worsen. This usually can last 7-10 days.       Return if symptoms worsen or fail to improve, for Follow up, 7 days.    At the end of the encounter, I discussed results, diagnosis, medications. Discussed red flags for immediate return to clinic/ER, as well as indications for follow up if no improvement. Patient understood and agreed to plan. Patient was stable for discharge.    Keisha Hoffman is a 43 year old male who presents to clinic today  for the following health issues:  Chief Complaint   Patient presents with    Urgent Care     Pain with cough and breathing, chest congestion, nasal discharge x 5 days      HPI  Patient reports cough, shortness of breath,  congestion and runny nose x 5 days.  He notes his wife is concerned about pneumonia. He has been taking Robitussin and dayquil and Ibuprofen. No fever or chills.       Review of Systems   HENT:  Positive for congestion.    Respiratory:  Positive for cough and shortness of breath.        Problem List:  2023: Lumbosacral radiculopathy  2022: Chronic left-sided low back pain with left-sided sciatica  2022: Essential hypertension, benign  2021: S/P lumbar microdiscectomy  2021: Herniation of lumbar intervertebral disc with radiculopathy  2021: Lumbosacral radiculopathy at S1  2021: Herniation of intervertebral disc between L5 and S1  2012: CARDIOVASCULAR SCREENING; LDL GOAL LESS THAN 160      Past Medical History:   Diagnosis Date    Hypertension        Social History     Tobacco Use    Smoking status: Former     Current packs/day: 0.00     Types: Cigarettes     Start date: 10/15/2016     Quit date: 10/15/2016     Years since quittin.0    Smokeless tobacco: Never   Substance Use Topics    Alcohol use: Yes     Comment: Socailly           Objective    BP (!) 141/88   Pulse 84   Temp 98.5  F (36.9  C) (Tympanic)   Resp 18   Wt 86.2 kg (190 lb)   SpO2 97%   BMI 29.76 kg/m    Physical Exam  Constitutional:       Appearance: Normal appearance.   HENT:      Head: Normocephalic.      Right Ear: Tympanic membrane normal.      Left Ear: Tympanic membrane normal.      Mouth/Throat:      Mouth: Mucous membranes are moist.      Pharynx: Oropharynx is clear. Uvula midline. No posterior oropharyngeal erythema.   Cardiovascular:      Rate and Rhythm: Normal rate and regular rhythm.   Pulmonary:      Effort: Pulmonary effort is normal.      Breath sounds: Normal breath sounds.   Lymphadenopathy:      Head:      Right side of head: No submental, submandibular or tonsillar adenopathy.      Left side of head: No submental, submandibular or tonsillar adenopathy.      Cervical: No cervical adenopathy.       Right cervical: No superficial cervical adenopathy.     Left cervical: No superficial cervical adenopathy.   Skin:     General: Skin is warm and dry.      Findings: No rash.   Neurological:      Mental Status: He is alert.   Psychiatric:         Mood and Affect: Mood normal.         Behavior: Behavior normal.              Karina David PA-C

## 2024-11-04 LAB — SARS-COV-2 RNA RESP QL NAA+PROBE: NEGATIVE

## 2024-12-13 DIAGNOSIS — M54.16 LUMBAR RADICULOPATHY: ICD-10-CM

## 2024-12-16 RX ORDER — GABAPENTIN 300 MG/1
300 CAPSULE ORAL 3 TIMES DAILY
Qty: 270 CAPSULE | Refills: 3 | Status: SHIPPED | OUTPATIENT
Start: 2024-12-16

## 2025-03-16 DIAGNOSIS — I10 ESSENTIAL HYPERTENSION, BENIGN: ICD-10-CM

## 2025-03-18 RX ORDER — LOSARTAN POTASSIUM 100 MG/1
100 TABLET ORAL DAILY
Qty: 90 TABLET | Refills: 0 | Status: SHIPPED | OUTPATIENT
Start: 2025-03-18

## 2025-05-12 ENCOUNTER — PATIENT OUTREACH (OUTPATIENT)
Dept: CARE COORDINATION | Facility: CLINIC | Age: 45
End: 2025-05-12
Payer: COMMERCIAL

## 2025-05-25 ENCOUNTER — MYC REFILL (OUTPATIENT)
Dept: FAMILY MEDICINE | Facility: CLINIC | Age: 45
End: 2025-05-25
Payer: COMMERCIAL

## 2025-05-25 DIAGNOSIS — I10 ESSENTIAL HYPERTENSION, BENIGN: ICD-10-CM

## 2025-05-27 RX ORDER — LOSARTAN POTASSIUM 100 MG/1
100 TABLET ORAL DAILY
Qty: 90 TABLET | Refills: 0 | Status: SHIPPED | OUTPATIENT
Start: 2025-05-27

## 2025-06-25 SDOH — HEALTH STABILITY: PHYSICAL HEALTH: ON AVERAGE, HOW MANY DAYS PER WEEK DO YOU ENGAGE IN MODERATE TO STRENUOUS EXERCISE (LIKE A BRISK WALK)?: 5 DAYS

## 2025-06-25 SDOH — HEALTH STABILITY: PHYSICAL HEALTH: ON AVERAGE, HOW MANY MINUTES DO YOU ENGAGE IN EXERCISE AT THIS LEVEL?: 30 MIN

## 2025-06-25 ASSESSMENT — ANXIETY QUESTIONNAIRES
7. FEELING AFRAID AS IF SOMETHING AWFUL MIGHT HAPPEN: NOT AT ALL
GAD7 TOTAL SCORE: 1
4. TROUBLE RELAXING: NOT AT ALL
GAD7 TOTAL SCORE: 1
GAD7 TOTAL SCORE: 1
6. BECOMING EASILY ANNOYED OR IRRITABLE: SEVERAL DAYS
5. BEING SO RESTLESS THAT IT IS HARD TO SIT STILL: NOT AT ALL
3. WORRYING TOO MUCH ABOUT DIFFERENT THINGS: NOT AT ALL
IF YOU CHECKED OFF ANY PROBLEMS ON THIS QUESTIONNAIRE, HOW DIFFICULT HAVE THESE PROBLEMS MADE IT FOR YOU TO DO YOUR WORK, TAKE CARE OF THINGS AT HOME, OR GET ALONG WITH OTHER PEOPLE: NOT DIFFICULT AT ALL
8. IF YOU CHECKED OFF ANY PROBLEMS, HOW DIFFICULT HAVE THESE MADE IT FOR YOU TO DO YOUR WORK, TAKE CARE OF THINGS AT HOME, OR GET ALONG WITH OTHER PEOPLE?: NOT DIFFICULT AT ALL
7. FEELING AFRAID AS IF SOMETHING AWFUL MIGHT HAPPEN: NOT AT ALL
1. FEELING NERVOUS, ANXIOUS, OR ON EDGE: NOT AT ALL
2. NOT BEING ABLE TO STOP OR CONTROL WORRYING: NOT AT ALL

## 2025-06-25 ASSESSMENT — PATIENT HEALTH QUESTIONNAIRE - PHQ9
SUM OF ALL RESPONSES TO PHQ QUESTIONS 1-9: 0
SUM OF ALL RESPONSES TO PHQ QUESTIONS 1-9: 0

## 2025-06-25 ASSESSMENT — SOCIAL DETERMINANTS OF HEALTH (SDOH): HOW OFTEN DO YOU GET TOGETHER WITH FRIENDS OR RELATIVES?: TWICE A WEEK

## 2025-06-26 ENCOUNTER — OFFICE VISIT (OUTPATIENT)
Dept: FAMILY MEDICINE | Facility: CLINIC | Age: 45
End: 2025-06-26
Payer: COMMERCIAL

## 2025-06-26 VITALS
OXYGEN SATURATION: 99 % | BODY MASS INDEX: 28.07 KG/M2 | DIASTOLIC BLOOD PRESSURE: 74 MMHG | HEIGHT: 69 IN | WEIGHT: 189.5 LBS | HEART RATE: 65 BPM | SYSTOLIC BLOOD PRESSURE: 110 MMHG | RESPIRATION RATE: 16 BRPM | TEMPERATURE: 96.9 F

## 2025-06-26 DIAGNOSIS — Z30.2 ENCOUNTER FOR VASECTOMY: ICD-10-CM

## 2025-06-26 DIAGNOSIS — Z00.00 ROUTINE GENERAL MEDICAL EXAMINATION AT A HEALTH CARE FACILITY: Primary | ICD-10-CM

## 2025-06-26 DIAGNOSIS — Z13.1 SCREENING FOR DIABETES MELLITUS: ICD-10-CM

## 2025-06-26 DIAGNOSIS — K64.8 INTERNAL HEMORRHOIDS: ICD-10-CM

## 2025-06-26 DIAGNOSIS — F33.42 RECURRENT MAJOR DEPRESSIVE DISORDER, IN FULL REMISSION: ICD-10-CM

## 2025-06-26 DIAGNOSIS — M54.16 LUMBAR RADICULOPATHY: ICD-10-CM

## 2025-06-26 DIAGNOSIS — I10 ESSENTIAL HYPERTENSION, BENIGN: ICD-10-CM

## 2025-06-26 DIAGNOSIS — Z12.11 SCREENING FOR COLON CANCER: ICD-10-CM

## 2025-06-26 DIAGNOSIS — Z13.220 SCREENING CHOLESTEROL LEVEL: ICD-10-CM

## 2025-06-26 DIAGNOSIS — Z13.0 SCREENING FOR DEFICIENCY ANEMIA: ICD-10-CM

## 2025-06-26 DIAGNOSIS — K62.5 BRBPR (BRIGHT RED BLOOD PER RECTUM): ICD-10-CM

## 2025-06-26 LAB
ALBUMIN SERPL BCG-MCNC: 4.5 G/DL (ref 3.5–5.2)
ALP SERPL-CCNC: 78 U/L (ref 40–150)
ALT SERPL W P-5'-P-CCNC: 39 U/L (ref 0–70)
ANION GAP SERPL CALCULATED.3IONS-SCNC: 13 MMOL/L (ref 7–15)
AST SERPL W P-5'-P-CCNC: 32 U/L (ref 0–45)
BILIRUB SERPL-MCNC: 0.4 MG/DL
BUN SERPL-MCNC: 13.7 MG/DL (ref 6–20)
CALCIUM SERPL-MCNC: 9.4 MG/DL (ref 8.8–10.4)
CHLORIDE SERPL-SCNC: 105 MMOL/L (ref 98–107)
CHOLEST SERPL-MCNC: 221 MG/DL
CREAT SERPL-MCNC: 0.92 MG/DL (ref 0.67–1.17)
EGFRCR SERPLBLD CKD-EPI 2021: >90 ML/MIN/1.73M2
ERYTHROCYTE [DISTWIDTH] IN BLOOD BY AUTOMATED COUNT: 12 % (ref 10–15)
FASTING STATUS PATIENT QL REPORTED: YES
FASTING STATUS PATIENT QL REPORTED: YES
GLUCOSE SERPL-MCNC: 92 MG/DL (ref 70–99)
HCO3 SERPL-SCNC: 21 MMOL/L (ref 22–29)
HCT VFR BLD AUTO: 43.5 % (ref 40–53)
HDLC SERPL-MCNC: 60 MG/DL
HGB BLD-MCNC: 15 G/DL (ref 13.3–17.7)
LDLC SERPL CALC-MCNC: 140 MG/DL
MCH RBC QN AUTO: 30.4 PG (ref 26.5–33)
MCHC RBC AUTO-ENTMCNC: 34.5 G/DL (ref 31.5–36.5)
MCV RBC AUTO: 88 FL (ref 78–100)
NONHDLC SERPL-MCNC: 161 MG/DL
PLATELET # BLD AUTO: 281 10E3/UL (ref 150–450)
POTASSIUM SERPL-SCNC: 4.2 MMOL/L (ref 3.4–5.3)
PROT SERPL-MCNC: 7.3 G/DL (ref 6.4–8.3)
RBC # BLD AUTO: 4.94 10E6/UL (ref 4.4–5.9)
SODIUM SERPL-SCNC: 139 MMOL/L (ref 135–145)
TRIGL SERPL-MCNC: 103 MG/DL
WBC # BLD AUTO: 5.9 10E3/UL (ref 4–11)

## 2025-06-26 RX ORDER — HYDROCORTISONE 25 MG/G
CREAM TOPICAL 2 TIMES DAILY PRN
Qty: 30 G | Refills: 1 | Status: SHIPPED | OUTPATIENT
Start: 2025-06-26

## 2025-06-26 RX ORDER — GABAPENTIN 300 MG/1
300 CAPSULE ORAL 3 TIMES DAILY
Qty: 270 CAPSULE | Refills: 3 | Status: SHIPPED | OUTPATIENT
Start: 2025-06-26

## 2025-06-26 RX ORDER — LOSARTAN POTASSIUM 100 MG/1
100 TABLET ORAL DAILY
Qty: 90 TABLET | Refills: 3 | Status: SHIPPED | OUTPATIENT
Start: 2025-06-26

## 2025-06-26 ASSESSMENT — PAIN SCALES - GENERAL: PAINLEVEL_OUTOF10: MILD PAIN (2)

## 2025-06-26 NOTE — PROGRESS NOTES
Preventive Care Visit  [facility]  Kp Duenas DO, Family Medicine  Jun 26, 2025      Assessment & Plan     Routine general medical examination at a health care facility  I encouraged him to keep getting regular exercise and eat healthy foods.  We are going to check fasting labs as listed below.    Essential hypertension, benign  Blood pressure is well-controlled on losartan 100 mg daily.  - losartan (COZAAR) 100 MG tablet; Take 1 tablet (100 mg) by mouth daily.    Lumbar radiculopathy  This issue has been stable since his L5-S1 microdiscectomy on 5/19/2021.  He still takes gabapentin regularly for this.  - gabapentin (NEURONTIN) 300 MG capsule; Take 1 capsule (300 mg) by mouth 3 times daily.    Major recurrent major depressive disorder, in full remission (H)  Depression symptoms are stable since weaning off Wellbutrin.    BRBPR (bright red blood per rectum)  Symptoms appear to be due to internal hemorrhoids.  We discussed strategies that can help with this.    Internal hemorrhoids  Symptoms and exam findings were consistent with internal hemorrhoids.  We discussed strategies such as increasing water and fiber intake, avoid straining and prolonged sitting and using topical Preparation H or hydrocortisone when needed.  He may also use stool softeners if needed.  - hydrocortisone, Perianal, (HYDROCORTISONE) 2.5 % cream; Place rectally 2 times daily as needed for hemorrhoids.    Screening for deficiency anemia  - CBC with platelets; Future    Screening cholesterol level  - Lipid Profile (Chol, Trig, HDL, LDL calc); Future    Screening for diabetes mellitus  - Comprehensive metabolic panel (BMP + Alb, Alk Phos, ALT, AST, Total. Bili, TP); Future    Screening for colon cancer  - Colonoscopy Screening  Referral; Future    Desires vasectomy  He was given a referral to urology to discuss getting a vasectomy.    Patient has been advised of split billing requirements and indicates  "understanding: Yes        BMI  Estimated body mass index is 27.78 kg/m  as calculated from the following:    Height as of this encounter: 1.759 m (5' 9.25\").    Weight as of this encounter: 86 kg (189 lb 8 oz).   Weight management plan: Discussed healthy diet and exercise guidelines  Reviewed preventive health counseling, as reflected in patient instructions       Regular exercise       Healthy diet/nutrition  Counseling  Appropriate preventive services were addressed with this patient via screening, questionnaire, or discussion as appropriate for fall prevention, nutrition, physical activity, Tobacco-use cessation, social engagement, weight loss and cognition.  Checklist reviewing preventive services available has been given to the patient.  Reviewed patient's diet, addressing concerns and/or questions.   The patient reports drinking more than 3 alcoholic drinks per day and/or more than 7 drhnks per week. The patient was counseled and given information about possible harmful effects of excessive alcohol intake.          Keisha Hoffman is a 44 year old, presenting for the following:  Physical        6/26/2025     7:51 AM   Additional Questions   Roomed by Ned PHAM        Answers submitted by the patient for this visit:  Patient Health Questionnaire (Submitted on 6/25/2025)  PHQ9 TOTAL SCORE: 0  Patient Health Questionnaire (G7) (Submitted on 6/25/2025)  DEISI 7 TOTAL SCORE: 1    HPI    He has a medical history significant for hypertension, depression, genital warts and chronic low back pain s/p L5-S1 microdiscectomy on 5/19/2021.     Social history: .                   6/25/2025   General Health   How would you rate your overall physical health? Good   Feel stress (tense, anxious, or unable to sleep) Not at all         6/25/2025   Nutrition   Three or more servings of calcium each day? Yes   Diet: Regular (no restrictions)   How many servings of fruit and vegetables per day? (!) 2-3   How many sweetened " beverages each day? 0-1         2025   Exercise   Days per week of moderate/strenous exercise 5 days   Average minutes spent exercising at this level 30 min         2025   Social Factors   Frequency of gathering with friends or relatives Twice a week   Worry food won't last until get money to buy more No   Food not last or not have enough money for food? No   Do you have housing? (Housing is defined as stable permanent housing and does not include staying outside in a car, in a tent, in an abandoned building, in an overnight shelter, or couch-surfing.) Yes   Are you worried about losing your housing? No   Lack of transportation? No   Unable to get utilities (heat,electricity)? No         2025   Dental   Dentist two times every year? Yes       Today's PHQ-9 Score:       2025    10:34 PM   PHQ-9 SCORE   PHQ-9 Total Score MyChart 0   PHQ-9 Total Score 0        Patient-reported         2025   Substance Use   Alcohol more than 3/day or more than 7/wk Yes   How often do you have a drink containing alcohol 4 or more times a week   How many alcohol drinks on typical day 3 or 4   How often do you have 5+ drinks at one occasion Monthly   Audit 2/3 Score 3   How often not able to stop drinking once started Never   How often failed to do what normally expected Never   How often needed first drink in am after a heavy drinking session Never   How often feeling of guilt or remorse after drinking Never   How often unable to remember what happened the night before Never   Have you or someone else been injured because of your drinking No   Has anyone been concerned or suggested you cut down on drinking No   TOTAL SCORE - AUDIT 7   Do you use any other substances recreationally? (!) CANNABIS PRODUCTS     Social History     Tobacco Use    Smoking status: Former     Current packs/day: 0.00     Types: Cigarettes     Start date: 10/15/2016     Quit date: 10/15/2016     Years since quittin.7    Smokeless  "tobacco: Never   Vaping Use    Vaping status: Never Used   Substance Use Topics    Alcohol use: Yes     Comment: Socailly    Drug use: Yes     Types: Marijuana     Comment: Marijuana           6/25/2025   STI Screening   New sexual partner(s) since last STI/HIV test? No   ASCVD Risk   The 10-year ASCVD risk score (Rosalia MOYA, et al., 2019) is: 1.4%    Values used to calculate the score:      Age: 44 years      Sex: Male      Is Non- : No      Diabetic: No      Tobacco smoker: No      Systolic Blood Pressure: 110 mmHg      Is BP treated: Yes      HDL Cholesterol: 61 mg/dL      Total Cholesterol: 230 mg/dL        6/25/2025   Contraception/Family Planning   Questions about contraception or family planning (!) YES         Reviewed and updated as needed this visit by Provider     Meds                      Review of Systems  Constitutional, HEENT, cardiovascular, pulmonary, GI, , musculoskeletal, neuro, skin, endocrine and psych systems are negative, except as otherwise noted.     Objective    Exam  /74   Pulse 65   Temp 96.9  F (36.1  C) (Temporal)   Resp 16   Ht 1.759 m (5' 9.25\")   Wt 86 kg (189 lb 8 oz)   SpO2 99%   BMI 27.78 kg/m     Estimated body mass index is 27.78 kg/m  as calculated from the following:    Height as of this encounter: 1.759 m (5' 9.25\").    Weight as of this encounter: 86 kg (189 lb 8 oz).    Physical Exam  GENERAL: alert and no distress  EYES: Eyes grossly normal to inspection, PERRL and conjunctivae and sclerae normal  HENT: ear canals and TM's normal, nose and mouth without ulcers or lesions  NECK: no adenopathy, no asymmetry, masses, or scars  RESP: lungs clear to auscultation - no rales, rhonchi or wheezes  CV: regular rate and rhythm, normal S1 S2, no S3 or S4, no murmur, click or rub, no peripheral edema  ABDOMEN: soft, nontender, no hepatosplenomegaly, no masses and bowel sounds normal  RECTAL: No external hemorrhoids or fissures visualized. "  Normal sphincter tone,.  There are a few small internal hemorrhoids palpated on digital rectal exam.  No rectal masses.  MS: no gross musculoskeletal defects noted, no edema  SKIN: no suspicious lesions or rashes  NEURO: Normal strength and tone, mentation intact and speech normal  PSYCH: mentation appears normal, affect normal/bright        Signed Electronically by: Kp Duenas, DO

## 2025-06-26 NOTE — PATIENT INSTRUCTIONS
Patient Education   Preventive Care Advice   This is general advice given by our system to help you stay healthy. However, your care team may have specific advice just for you. Please talk to your care team about your preventive care needs.  Nutrition  Eat 5 or more servings of fruits and vegetables each day.  Try wheat bread, brown rice and whole grain pasta (instead of white bread, rice, and pasta).  Get enough calcium and vitamin D. Check the label on foods and aim for 100% of the RDA (recommended daily allowance).  Lifestyle  Exercise at least 150 minutes each week  (30 minutes a day, 5 days a week).  Do muscle strengthening activities 2 days a week. These help control your weight and prevent disease.  No smoking.  Wear sunscreen to prevent skin cancer.  Have a dental exam and cleaning every 6 months.  Yearly exams  See your health care team every year to talk about:  Any changes in your health.  Any medicines your care team has prescribed.  Preventive care, family planning, and ways to prevent chronic diseases.  Shots (vaccines)   HPV shots (up to age 26), if you've never had them before.  Hepatitis B shots (up to age 59), if you've never had them before.  COVID-19 shot: Get this shot when it's due.  Flu shot: Get a flu shot every year.  Tetanus shot: Get a tetanus shot every 10 years.  Pneumococcal, hepatitis A, and RSV shots: Ask your care team if you need these based on your risk.  Shingles shot (for age 50 and up)  General health tests  Diabetes screening:  Starting at age 35, Get screened for diabetes at least every 3 years.  If you are younger than age 35, ask your care team if you should be screened for diabetes.  Cholesterol test: At age 39, start having a cholesterol test every 5 years, or more often if advised.  Bone density scan (DEXA): At age 50, ask your care team if you should have this scan for osteoporosis (brittle bones).  Hepatitis C: Get tested at least once in your life.  STIs (sexually  transmitted infections)  Before age 24: Ask your care team if you should be screened for STIs.  After age 24: Get screened for STIs if you're at risk. You are at risk for STIs (including HIV) if:  You are sexually active with more than one person.  You don't use condoms every time.  You or a partner was diagnosed with a sexually transmitted infection.  If you are at risk for HIV, ask about PrEP medicine to prevent HIV.  Get tested for HIV at least once in your life, whether you are at risk for HIV or not.  Cancer screening tests  Cervical cancer screening: If you have a cervix, begin getting regular cervical cancer screening tests starting at age 21.  Breast cancer scan (mammogram): If you've ever had breasts, begin having regular mammograms starting at age 40. This is a scan to check for breast cancer.  Colon cancer screening: It is important to start screening for colon cancer at age 45.  Have a colonoscopy test every 10 years (or more often if you're at risk) Or, ask your provider about stool tests like a FIT test every year or Cologuard test every 3 years.  To learn more about your testing options, visit:   .  For help making a decision, visit:   https://bit.ly/rp83792.  Prostate cancer screening test: If you have a prostate, ask your care team if a prostate cancer screening test (PSA) at age 55 is right for you.  Lung cancer screening: If you are a current or former smoker ages 50 to 80, ask your care team if ongoing lung cancer screenings are right for you.  For informational purposes only. Not to replace the advice of your health care provider. Copyright   2023 [facility]. All rights reserved. Clinically reviewed by the [facility]. Infogram 209393 - REV 01/24.  9 Ways to Cut Back on Drinking  Maybe you've found yourself drinking more alcohol than you'd prefer. If you want to cut back, here are some ideas to try.    Think before you drink.  Do you really want a drink,  "or is it just a habit? If you're used to having a drink at a certain time, try doing something else then.     Look for substitutes.  Find some no-alcohol drinks that you enjoy, like flavored seltzer water, tea with honey, or tonic with a slice of lime. Or try alcohol-free beer or \"virgin\" cocktails (without the alcohol).     Drink more water.  Use water to quench your thirst. Drink a glass of water before you have any alcohol. Have another glass along with every drink or between drinks.     Shrink your drink.  For example, have a bottle of beer instead of a pint. Use a smaller glass for wine. Choose drinks with lower alcohol content (ABV%). Or use less liquor and more mixer in cocktails.     Slow down.  It's easy to drink quickly and without thinking about it. Pay attention, and make each drink last longer.     Do the math.  Total up how much you spend on alcohol each month. How much is that a year? If you cut back, what could you do with the money you save?     Take a break.  Choose a day or two each week when you won't drink at all. Notice how you feel on those days, physically and emotionally. How did you sleep? Do you feel better? Over time, add more break days.     Count calories.  Would you like to lose some weight? For some people that's a good motivator for cutting back. Figure out how many calories are in each drink. How many does that add up to in a day? In a week? In a month?     Practice saying no.  Be ready when someone offers you a drink. Try: \"Thanks, I've had enough.\" Or \"Thanks, but I'm cutting back.\" Or \"No, thanks. I feel better when I drink less.\"   Current as of: August 20, 2024  Content Version: 14.5 2024-2025 [employer].   Care instructions adapted under license by your healthcare professional. If you have questions about a medical condition or this instruction, always ask your healthcare professional. [employer] disclaims any warranty or liability for your use of " this information.  Substance Use Disorder: Care Instructions  Overview     You can improve your life and health by stopping your use of alcohol or drugs. When you don't drink or use drugs, you may feel and sleep better. You may get along better with your family, friends, and coworkers. There are medicines and programs that can help with substance use disorder.  How can you care for yourself at home?  Here are some ways to help you stay sober and prevent relapse.  If you have been given medicine to help keep you sober or reduce your cravings, be sure to take it exactly as prescribed.  Talk to your doctor about programs that can help you stop using drugs or drinking alcohol.  Do not keep alcohol or drugs in your home.  Plan ahead. Think about what you'll say if other people ask you to drink or use drugs. Try not to spend time with people who drink or use drugs.  Use the time and money spent on drinking or drugs to do something that's important to you.  Preventing a relapse  Have a plan to deal with relapse. Learn to recognize changes in your thinking that lead you to drink or use drugs. Get help before you start to drink or use drugs again.  Try to stay away from situations, friends, or places that may lead you to drink or use drugs.  If you feel the need to drink alcohol or use drugs again, seek help right away. Call a trusted friend or family member. Some people get support from organizations such as Narcotics Anonymous or [employer] or from treatment facilities.  If you relapse, get help as soon as you can. Some people make a plan with another person that outlines what they want that person to do for them if they relapse. The plan usually includes how to handle the relapse and who to notify in case of relapse.  Don't give up. Remember that a relapse doesn't mean that you have failed. Use the experience to learn the triggers that lead you to drink or use drugs. Then quit again. Recovery is a lifelong process.  "Many people have several relapses before they are able to quit for good.  Follow-up care is a key part of your treatment and safety. Be sure to make and go to all appointments, and call your doctor if you are having problems. It's also a good idea to know your test results and keep a list of the medicines you take.  When should you call for help?   Call 911  anytime you think you may need emergency care. For example, call if you or someone else:    Has overdosed or has withdrawal signs. Be sure to tell the emergency workers that you are or someone else is using or trying to quit using drugs. Overdose or withdrawal signs may include:  Losing consciousness.  Seizure.  Seeing or hearing things that aren't there (hallucinations).     Is thinking or talking about suicide or harming others.   Where to get help 24 hours a day, 7 days a week   If you or someone you know talks about suicide, self-harm, a mental health crisis, a substance use crisis, or any other kind of emotional distress, get help right away. You can:    Call the Suicide and Crisis Lifeline at 988.     Call 3-688-484-XDIN (1-817.142.3696).     Text HOME to 688813 to access the Crisis Text Line.   Consider saving these numbers in your phone.  Go to [employer] for more information or to chat online.  Call your doctor now or seek immediate medical care if:    You are having withdrawal symptoms. These may include nausea or vomiting, sweating, shakiness, and anxiety.   Watch closely for changes in your health, and be sure to contact your doctor if:    You have a relapse.     You need more help or support to stop.   Where can you learn more?  Go to https://www.health[employer].net/patiented  Enter H573 in the search box to learn more about \"Substance Use Disorder: Care Instructions.\"  Current as of: August 20, 2024  Content Version: 14.5    9262-1477 [employer].   Care instructions adapted under license by your healthcare professional. If you have " questions about a medical condition or this instruction, always ask your healthcare professional. [employer] disclaims any warranty or liability for your use of this information.

## 2025-06-30 ENCOUNTER — PATIENT OUTREACH (OUTPATIENT)
Dept: CARE COORDINATION | Facility: CLINIC | Age: 45
End: 2025-06-30
Payer: COMMERCIAL

## 2025-07-01 ENCOUNTER — TELEPHONE (OUTPATIENT)
Dept: GASTROENTEROLOGY | Facility: CLINIC | Age: 45
End: 2025-07-01
Payer: COMMERCIAL

## 2025-07-01 NOTE — TELEPHONE ENCOUNTER
"Endoscopy Scheduling Screen    Caller: patient    Have you had any respiratory illness or flu-like symptoms in the last 10 days?  No    Patient is ACTIVE on [employer].  Inform patient that all appointment instructions will be sent via [employer].    Review patient's insurance for any non participating payor.    Ordering/Referring Provider: Kp Will DO     (If ordering provider performs procedure, schedule with ordering provider unless otherwise instructed. )    BMI: Estimated body mass index is 27.78 kg/m  as calculated from the following:    Height as of 6/26/25: 1.759 m (5' 9.25\").    Weight as of 6/26/25: 86 kg (189 lb 8 oz).     Sedation Ordered  moderate sedation.   If patient BMI > 50 do not schedule in [facility].    If patient BMI > 45 do not schedule at [facility].    Are you taking methadone or Suboxone?  NO, No RN review required.    Have you been diagnosed and are being treated for severe PTSD or severe anxiety?  NO, No RN review required.    Are you taking any prescription medications for pain 3 or more times per week?   NO, No RN review required.    Do you have a history of malignant hyperthermia?  No    (Females) Are you currently pregnant?   No     Have you been diagnosed or told you have pulmonary hypertension?   No    Do you have an LVAD?  No    Have you been told you have moderate to severe sleep apnea?  No.    Have you been told you have COPD, asthma, or any other lung disease?  No    Has your doctor ordered any cardiac tests like echo, angiogram, stress test, ablation, or EKG, that you have not completed yet?  No    Do you  have a history of any heart conditions?  No     Have you ever had or are you waiting for an organ transplant?  No. Continue scheduling, no site restrictions.    Have you had a stroke or transient ischemic attack (TIA aka \"mini stroke\") in the last 2 years?   No.    Have you been diagnosed with or been told you have cirrhosis of the liver?   No.    Are you currently on " "dialysis?   No    Do you need assistance transferring?   No    BMI: Estimated body mass index is 27.78 kg/m  as calculated from the following:    Height as of 6/26/25: 1.759 m (5' 9.25\").    Weight as of 6/26/25: 86 kg (189 lb 8 oz).     Is patients BMI > 40 and scheduling location [facility]?  No    Do you take an injectable or oral medication for weight loss or diabetes (excluding insulin)?  No    Do you take the medication Naltrexone?  No    Do you take blood thinners?  No       Prep   Are you currently on dialysis or do you have chronic kidney disease?  No    Do you have a diagnosis of diabetes?  No    Do you have a diagnosis of cystic fibrosis (CF)?  No    On a regular basis do you go 3 -5 days between bowel movements?  No    BMI > 40?  No    Preferred Pharmacy:        [id number] [place], [place] - [address] AT [facility] & [address] [place] [id number]  Phone: [phone] Fax: [phone]      Final Scheduling Details   Patient has to discuss dates with his wife.  "

## (undated) DEVICE — TRAY PAIN INJECTION 97A 640

## (undated) DEVICE — GLOVE PROTEXIS MICRO 6.0  2D73PM60

## (undated) DEVICE — SOL NACL 0.9% IRRIG 1000ML BOTTLE 2F7124

## (undated) DEVICE — SUCTION MANIFOLD NEPTUNE 2 SYS 4 PORT 0702-020-000

## (undated) DEVICE — GLOVE PROTEXIS BLUE W/NEU-THERA 8.5  2D73EB85

## (undated) DEVICE — DRAPE MICROSCOPE OPMI ZEISS 48X118" 306071-0000-000

## (undated) DEVICE — DRAPE MAYO STAND 23X54 8337

## (undated) DEVICE — PREP CHLORAPREP W/ORANGE TINT 10.5ML 260715

## (undated) DEVICE — PAD FOAM WILSON FRAME KIT

## (undated) DEVICE — SU MONOCRYL 4-0 PS-2 27" UND Y426H

## (undated) DEVICE — GLOVE PROTEXIS W/NEU-THERA 8.0  2D73TE80

## (undated) DEVICE — CUSHION INSERT LG PRONE VIEW JACKSON TABLE

## (undated) DEVICE — SYR 10ML PERFIX LL 332152

## (undated) DEVICE — SYR 30ML LL W/O NDL 302832

## (undated) DEVICE — PREP CHLORAPREP 26ML TINTED HI-LITE ORANGE 930815

## (undated) DEVICE — SU VICRYL 2-0 CT-2 CR 8X18" J726D

## (undated) DEVICE — LINEN ORTHO ACL PACK 5447

## (undated) DEVICE — NDL EPIDURAL TUOHY 20GA 3.5" 405028

## (undated) DEVICE — GLOVE PROTEXIS MICRO 7.5  2D73PM75

## (undated) DEVICE — ESU GROUND PAD ADULT W/CORD E7507

## (undated) DEVICE — DRAPE X-RAY TUBE 00-901169-01-OEC

## (undated) DEVICE — SPONGE COTTONOID 1/2X1/2" 80-1400

## (undated) DEVICE — RX SURGIFLO HEMOSTATIC MATRIX 8ML 2991

## (undated) DEVICE — SU VICRYL 0 UR-6 27" J603H

## (undated) DEVICE — PACK SMALL SPINE RIDGES

## (undated) DEVICE — DRAPE STERI TOWEL LG 1010

## (undated) DEVICE — GLOVE PROTEXIS BLUE W/NEU-THERA 8.0  2D73EB80

## (undated) DEVICE — MIDAS REX DISSECTING TOOL 5.5MM T14MH25

## (undated) DEVICE — ADH SKIN CLOSURE PREMIERPRO EXOFIN 1.0ML 3470

## (undated) DEVICE — ESU ELEC BLADE 6" COATED/INSULATED E1455-6

## (undated) RX ORDER — PROPOFOL 10 MG/ML
INJECTION, EMULSION INTRAVENOUS
Status: DISPENSED
Start: 2021-05-19

## (undated) RX ORDER — ACETAMINOPHEN 325 MG/1
TABLET ORAL
Status: DISPENSED
Start: 2021-05-19

## (undated) RX ORDER — IBUPROFEN 600 MG/1
TABLET, FILM COATED ORAL
Status: DISPENSED
Start: 2021-05-19

## (undated) RX ORDER — LIDOCAINE HYDROCHLORIDE 10 MG/ML
INJECTION, SOLUTION EPIDURAL; INFILTRATION; INTRACAUDAL; PERINEURAL
Status: DISPENSED
Start: 2021-05-19

## (undated) RX ORDER — ONDANSETRON 2 MG/ML
INJECTION INTRAMUSCULAR; INTRAVENOUS
Status: DISPENSED
Start: 2021-05-19

## (undated) RX ORDER — DEXAMETHASONE SODIUM PHOSPHATE 4 MG/ML
INJECTION, SOLUTION INTRA-ARTICULAR; INTRALESIONAL; INTRAMUSCULAR; INTRAVENOUS; SOFT TISSUE
Status: DISPENSED
Start: 2021-05-19

## (undated) RX ORDER — FENTANYL CITRATE 50 UG/ML
INJECTION, SOLUTION INTRAMUSCULAR; INTRAVENOUS
Status: DISPENSED
Start: 2021-05-19

## (undated) RX ORDER — BUPIVACAINE HYDROCHLORIDE AND EPINEPHRINE 2.5; 5 MG/ML; UG/ML
INJECTION, SOLUTION EPIDURAL; INFILTRATION; INTRACAUDAL; PERINEURAL
Status: DISPENSED
Start: 2021-05-19

## (undated) RX ORDER — ERYTHROMYCIN 5 MG/G
OINTMENT OPHTHALMIC
Status: DISPENSED
Start: 2021-10-15

## (undated) RX ORDER — CEFAZOLIN SODIUM 2 G/100ML
INJECTION, SOLUTION INTRAVENOUS
Status: DISPENSED
Start: 2021-05-19

## (undated) RX ORDER — METOPROLOL TARTRATE 1 MG/ML
INJECTION, SOLUTION INTRAVENOUS
Status: DISPENSED
Start: 2021-05-19

## (undated) RX ORDER — OXYCODONE HYDROCHLORIDE 5 MG/1
TABLET ORAL
Status: DISPENSED
Start: 2021-05-19

## (undated) RX ORDER — GLYCOPYRROLATE 0.2 MG/ML
INJECTION INTRAMUSCULAR; INTRAVENOUS
Status: DISPENSED
Start: 2021-05-19

## (undated) RX ORDER — HYDROXYZINE HYDROCHLORIDE 25 MG/1
TABLET, FILM COATED ORAL
Status: DISPENSED
Start: 2021-05-19